# Patient Record
Sex: FEMALE | Race: WHITE | NOT HISPANIC OR LATINO | Employment: FULL TIME | URBAN - NONMETROPOLITAN AREA
[De-identification: names, ages, dates, MRNs, and addresses within clinical notes are randomized per-mention and may not be internally consistent; named-entity substitution may affect disease eponyms.]

---

## 2018-03-14 ENCOUNTER — OFFICE VISIT (OUTPATIENT)
Dept: FAMILY MEDICINE CLINIC | Facility: CLINIC | Age: 38
End: 2018-03-14

## 2018-03-14 VITALS
TEMPERATURE: 97.8 F | DIASTOLIC BLOOD PRESSURE: 78 MMHG | OXYGEN SATURATION: 99 % | BODY MASS INDEX: 42.22 KG/M2 | RESPIRATION RATE: 20 BRPM | SYSTOLIC BLOOD PRESSURE: 118 MMHG | WEIGHT: 269 LBS | HEART RATE: 64 BPM | HEIGHT: 67 IN

## 2018-03-14 DIAGNOSIS — Z00.00 ENCOUNTER FOR GENERAL ADULT MEDICAL EXAMINATION W/O ABNORMAL FINDINGS: ICD-10-CM

## 2018-03-14 DIAGNOSIS — F41.9 ANXIETY: Primary | ICD-10-CM

## 2018-03-14 DIAGNOSIS — F33.9 EPISODE OF RECURRENT MAJOR DEPRESSIVE DISORDER, UNSPECIFIED DEPRESSION EPISODE SEVERITY (HCC): ICD-10-CM

## 2018-03-14 DIAGNOSIS — M79.672 PAIN OF LEFT HEEL: ICD-10-CM

## 2018-03-14 DIAGNOSIS — G47.09 OTHER INSOMNIA: ICD-10-CM

## 2018-03-14 DIAGNOSIS — M77.32 HEEL SPUR, LEFT: ICD-10-CM

## 2018-03-14 DIAGNOSIS — M62.830 BACK MUSCLE SPASM: ICD-10-CM

## 2018-03-14 DIAGNOSIS — Z01.419 ENCOUNTER FOR GYNECOLOGICAL EXAMINATION WITHOUT ABNORMAL FINDING: ICD-10-CM

## 2018-03-14 DIAGNOSIS — B35.1 ONYCHOMYCOSIS OF TOENAIL: ICD-10-CM

## 2018-03-14 DIAGNOSIS — N92.6 IRREGULAR MENSES: ICD-10-CM

## 2018-03-14 DIAGNOSIS — F39 MOOD DISORDER (HCC): ICD-10-CM

## 2018-03-14 PROBLEM — K21.9 GERD (GASTROESOPHAGEAL REFLUX DISEASE): Status: ACTIVE | Noted: 2018-03-14

## 2018-03-14 PROBLEM — F32.9 MAJOR DEPRESSIVE DISORDER WITH CURRENT ACTIVE EPISODE: Status: ACTIVE | Noted: 2018-03-14

## 2018-03-14 PROCEDURE — 99214 OFFICE O/P EST MOD 30 MIN: CPT | Performed by: NURSE PRACTITIONER

## 2018-03-14 RX ORDER — CETIRIZINE HYDROCHLORIDE 10 MG/1
10 TABLET ORAL NIGHTLY
COMMUNITY

## 2018-03-14 RX ORDER — NORGESTIMATE AND ETHINYL ESTRADIOL 7DAYSX3 28
1 KIT ORAL
COMMUNITY
Start: 2016-09-27 | End: 2018-03-26

## 2018-03-14 RX ORDER — METHOCARBAMOL 750 MG/1
750 TABLET, FILM COATED ORAL 4 TIMES DAILY PRN
Qty: 30 TABLET | Refills: 1 | Status: SHIPPED | OUTPATIENT
Start: 2018-03-14 | End: 2019-03-21

## 2018-03-14 RX ORDER — TRAZODONE HYDROCHLORIDE 50 MG/1
50 TABLET ORAL 2 TIMES DAILY
Qty: 60 TABLET | Refills: 4 | Status: SHIPPED | OUTPATIENT
Start: 2018-03-14 | End: 2018-07-24

## 2018-03-14 RX ORDER — IBUPROFEN 200 MG
200 TABLET ORAL
COMMUNITY
End: 2018-03-26 | Stop reason: ALTCHOICE

## 2018-03-14 RX ORDER — TERBINAFINE HYDROCHLORIDE 250 MG/1
250 TABLET ORAL DAILY
Qty: 90 TABLET | Refills: 0 | Status: SHIPPED | OUTPATIENT
Start: 2018-03-14 | End: 2018-07-24

## 2018-03-14 RX ORDER — TRAZODONE HYDROCHLORIDE 50 MG/1
TABLET ORAL
COMMUNITY
Start: 2017-11-23 | End: 2018-03-14 | Stop reason: SDUPTHER

## 2018-03-14 NOTE — PROGRESS NOTES
"Chief Complaint   Patient presents with   • Establish Care       Subjective   HPI   Maria E Red is a 37 y.o. female presents to the office to establish care and for evaluation of back pain, anxiety/depression and heel pain.     PMH  Anxiety/deprssion- worsening; previously well controlled with trazodone   Requesting refill today    Back pain- waxing and waning; previous history of back spasm, no known injury     HPI   Patient presents to establish care. Previous PCP was Anyi Winter- private insurance will no longer cover this provider.   Patient is requesting to be restarted on trazodone previously prescribed for anxiety, depression,and sleep management. She ran out of her Rx 2 weeks prior. Patient states trazodone was effective in treating depression, anxiety, and insomnia and denies side effects. She is requesting a refill today.    She also c/o right sided back pain related to muscle spasm. 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 She has taken IBU with mild relief of pain. She denies injury, loss of bowel and bladder function, tingling numbness, and paresthesia.     She also c/o bilateral great toe fungus that she has tried to treat with otc medication with poor results. She states fungus gets some better, but then it comes back. She denies s/sx of any surrounding soft tissue infection.     She also c/o left heel pain. She thinks she may have a heel spur and would like to complete and XR today to confirm.     Family History   Problem Relation Age of Onset   • Hypertension Father    • Alcohol abuse Father    • Arthritis Father    • Depression Father    • Hyperlipidemia Father    • Arthritis Mother    • Depression Mother    • Early death Mother    • Depression Sister    • Drug abuse Brother    • Arthritis Maternal Grandmother    • COPD Maternal Grandmother    • Cancer Maternal Grandmother    • Depression Maternal " Grandmother    • Diabetes Maternal Grandmother    • Heart disease Maternal Grandmother    • Hypertension Maternal Grandmother    • Thyroid disease Maternal Grandmother    • Lymphoma Maternal Grandmother    • Arthritis Maternal Grandfather    • Hypertension Maternal Grandfather    • Arthritis Paternal Grandmother    • Depression Paternal Grandmother    • Heart disease Paternal Grandmother    • Hypertension Paternal Grandmother    • Cancer Paternal Grandmother    • Breast cancer Paternal Grandmother    • Arthritis Paternal Grandfather    • Heart disease Paternal Grandfather    • Hypertension Paternal Grandfather    • Kidney disease Paternal Uncle    • Learning disabilities Neg Hx      Social History     Social History   • Marital status:      Spouse name: N/A   • Number of children: N/A   • Years of education: N/A     Occupational History   • Not on file.     Social History Main Topics   • Smoking status: Former Smoker     Packs/day: 0.50     Years: 16.00     Quit date: 10/1/2016   • Smokeless tobacco: Never Used      Comment: quit and then started bk at a pack a week. Quit again after that   • Alcohol use Yes      Comment: occational    • Drug use: No   • Sexual activity: Yes     Partners: Male     Birth control/ protection: None     Other Topics Concern   • Not on file     Social History Narrative   • No narrative on file       Review of Systems   Constitutional: Negative.  Negative for activity change, chills, fatigue, fever and unexpected weight change.   HENT: Negative.  Negative for congestion, ear pain, postnasal drip, rhinorrhea, sinus pressure and sore throat.    Eyes: Negative.  Negative for pain and redness.   Respiratory: Negative.  Negative for cough, choking, chest tightness, shortness of breath and wheezing.    Cardiovascular: Negative.  Negative for chest pain, palpitations and leg swelling.   Gastrointestinal: Negative.  Negative for abdominal distention, abdominal pain, constipation, diarrhea,  "nausea and rectal pain.   Endocrine: Negative.    Genitourinary: Negative.  Negative for decreased urine volume, difficulty urinating, dysuria, flank pain, hematuria and urgency.   Musculoskeletal: Positive for back pain. Negative for gait problem and neck pain.   Skin: Negative.  Negative for rash and wound.   Allergic/Immunologic: Negative.    Neurological: Negative.  Negative for dizziness, syncope, weakness, light-headedness, numbness and headaches.   Hematological: Negative.    Psychiatric/Behavioral: Positive for agitation, dysphoric mood and sleep disturbance. Negative for behavioral problems, confusion, self-injury and suicidal ideas. The patient is not nervous/anxious.      14 Point ROS completed with pertinent positives discussed. All other systems reviewed and are negative.     The following portions of the patient's history were reviewed and updated as appropriate: allergies, current medications, past family history, past medical history, past social history, past surgical history and problem list.    Encounter Vitals  Vitals:    03/14/18 0840   BP: 118/78   Pulse: 64   Resp: 20   Temp: 97.8 °F (36.6 °C)   TempSrc: Tympanic   SpO2: 99%   Weight: 122 kg (269 lb)   Height: 170.2 cm (67\")   PainSc: 2  Comment: muscle spasms right sided   PainLoc: Back       Objective:  Physical Exam   Constitutional: She is oriented to person, place, and time. Vital signs are normal. She appears well-developed and well-nourished.   HENT:   Head: Normocephalic and atraumatic.   Right Ear: Tympanic membrane, external ear and ear canal normal.   Left Ear: Tympanic membrane, external ear and ear canal normal.   Nose: Nose normal.   Mouth/Throat: Uvula is midline, oropharynx is clear and moist and mucous membranes are normal. No posterior oropharyngeal edema or posterior oropharyngeal erythema.   Eyes: Lids are normal. Pupils are equal, round, and reactive to light.   Neck: Trachea normal and normal range of motion. Neck supple. " No thyroid mass present.   Cardiovascular: Normal rate, regular rhythm, S1 normal, S2 normal, normal heart sounds and intact distal pulses.  Exam reveals no gallop and no friction rub.    No murmur heard.  Pulmonary/Chest: Effort normal and breath sounds normal. No respiratory distress. She has no wheezes. She has no rales.   Abdominal: Soft. Normal appearance and bowel sounds are normal. She exhibits no mass. There is no rebound and no guarding.   Musculoskeletal: Normal range of motion.        Lumbar back: She exhibits tenderness and spasm.     Skin Integrity  -  She has right foot onychomycosis.She has left foot onychomycosis..     Lymphadenopathy:     She has no cervical adenopathy.   Neurological: She is alert and oriented to person, place, and time. She has normal strength. No cranial nerve deficit or sensory deficit. Gait normal.   Skin: Skin is warm and dry. No rash noted.   Psychiatric: She has a normal mood and affect. Her speech is normal and behavior is normal. Judgment and thought content normal. Cognition and memory are normal.   Nursing note and vitals reviewed.      Pertinent Labs  No visits with results within 1 Month(s) from this visit.   Latest known visit with results is:   Hospital Outpatient Visit on 08/23/2016   Component Date Value Ref Range Status   • Spec Descr 1 08/25/2016 SPECIMEN(S): A ENDOCERVICAL   Final   ]  Labs have been independently reviewed    Key Imaging/Tracings/POC Testing  XR left footL  calcaneus spurring  No fracture noted    Assessment and Medications  Maria E was seen today for establish care.    Diagnoses and all orders for this visit:    Anxiety    Pain of left heel  -     Cancel: XR Foot 3+ View Left  -     XR Foot 3+ View Left    Back muscle spasm  -     methocarbamol (ROBAXIN) 750 MG tablet; Take 1 tablet by mouth 4 (Four) Times a Day As Needed for Muscle Spasms.    Mood disorder  -     traZODone (DESYREL) 50 MG tablet; Take 1 tablet by mouth 2 (Two) Times a  Day.    Other insomnia  -     traZODone (DESYREL) 50 MG tablet; Take 1 tablet by mouth 2 (Two) Times a Day.    Episode of recurrent major depressive disorder, unspecified depression episode severity  -     traZODone (DESYREL) 50 MG tablet; Take 1 tablet by mouth 2 (Two) Times a Day.    Heel spur, left  -     Ambulatory Referral to Podiatry    Encounter for general adult medical examination w/o abnormal findings  -     CBC & Differential  -     Comprehensive Metabolic Panel  -     Hemoglobin A1c  -     Lipid Panel  -     TSH  -     Vitamin D 25 Hydroxy  -     Vitamin B12  -     CBC Auto Differential    Encounter for gynecological examination without abnormal finding    Onychomycosis of toenail  -     terbinafine (LAMISIL) 250 MG tablet; Take 1 tablet by mouth Daily.    Irregular menses  -     Ambulatory Referral to Gynecology      Side effects of ordered medications discussed with patient.     Plan/Additional Notes/Instructions  Plan   1. Refer to podiatry per patient request for left heel pain secondary to spurring  2. Fasting labs tbc this week- will schedule appt for review after completion  3. lamisil for bilateral toenail fungus- must use for 3 months. Will take at least 6-12 months for toenail to grow out and be healed  4. Robaxin to use prn for lumbar back spasm  5. Gyn referral per patient request- need to establish care and c/o irregular menses  6. F/u after labwork is complete and after referrals-   7. Will evaluate efficacy of trazodone at f/u appt    Follow-Up  Return in about 4 weeks (around 4/11/2018), or if symptoms worsen or fail to improve, for After above ordered referral is complete, After ordered studies are completed.    Patient/caregiver verbalizes understanding of all orders and instructions in this plan of care.       PHQ-2/PHQ-9 Depression Screening 3/14/2018   Little interest or pleasure in doing things 1   Feeling down, depressed, or hopeless 1   Trouble falling or staying asleep, or  sleeping too much 2   Feeling tired or having little energy 3   Poor appetite or overeating 3   Feeling bad about yourself - or that you are a failure or have let yourself or your family down 0   Trouble concentrating on things, such as reading the newspaper or watching television 3   Moving or speaking so slowly that other people could have noticed. Or the opposite - being so fidgety or restless that you have been moving around a lot more than usual 0   Thoughts that you would be better off dead, or of hurting yourself in some way 0   Total Score 13   If you checked off any problems, how difficult have these problems made it for you to do your work, take care of things at home, or get along with other people? Somewhat difficult     Total Score: 13           This document has been electronically signed by KIERRA Pagan on March 15, 2018 12:06 PM

## 2018-03-15 ENCOUNTER — TELEPHONE (OUTPATIENT)
Dept: FAMILY MEDICINE CLINIC | Facility: CLINIC | Age: 38
End: 2018-03-15

## 2018-03-15 PROBLEM — B35.1 ONYCHOMYCOSIS OF TOENAIL: Status: ACTIVE | Noted: 2018-03-15

## 2018-03-15 LAB
25(OH)D3 SERPL-MCNC: 19.8 NG/ML (ref 30–100)
ALBUMIN SERPL-MCNC: 4 G/DL (ref 3.2–5.5)
ALBUMIN/GLOB SERPL: 1.4 G/DL (ref 1–3)
ALP SERPL-CCNC: 62 U/L (ref 15–121)
ALT SERPL W P-5'-P-CCNC: 44 U/L (ref 10–60)
ANION GAP SERPL CALCULATED.3IONS-SCNC: 8 MMOL/L (ref 5–15)
AST SERPL-CCNC: 29 U/L (ref 10–60)
BASOPHILS # BLD AUTO: 0.03 10*3/MM3 (ref 0–0.2)
BASOPHILS NFR BLD AUTO: 0.4 % (ref 0–2)
BILIRUB SERPL-MCNC: 1.4 MG/DL (ref 0.2–1)
BUN BLD-MCNC: 11 MG/DL (ref 8–25)
BUN/CREAT SERPL: 18.3 (ref 7–25)
CALCIUM SPEC-SCNC: 8.9 MG/DL (ref 8.4–10.8)
CHLORIDE SERPL-SCNC: 104 MMOL/L (ref 100–112)
CHOLEST SERPL-MCNC: 137 MG/DL (ref 150–200)
CO2 SERPL-SCNC: 24 MMOL/L (ref 20–32)
CREAT BLD-MCNC: 0.6 MG/DL (ref 0.4–1.3)
DEPRECATED RDW RBC AUTO: 44.8 FL (ref 36.4–46.3)
EOSINOPHIL # BLD AUTO: 0.06 10*3/MM3 (ref 0–0.7)
EOSINOPHIL NFR BLD AUTO: 0.8 % (ref 0–7)
ERYTHROCYTE [DISTWIDTH] IN BLOOD BY AUTOMATED COUNT: 13.8 % (ref 11.5–14.5)
GFR SERPL CREATININE-BSD FRML MDRD: 113 ML/MIN/1.73 (ref 64–149)
GLOBULIN UR ELPH-MCNC: 2.9 GM/DL (ref 2.5–4.6)
GLUCOSE BLD-MCNC: 88 MG/DL (ref 70–100)
HBA1C MFR BLD: 5.5 % (ref 4–5.6)
HCT VFR BLD AUTO: 38.4 % (ref 35–45)
HDLC SERPL-MCNC: 42 MG/DL (ref 35–100)
HGB BLD-MCNC: 12.9 G/DL (ref 12–15.5)
LDLC SERPL CALC-MCNC: 85 MG/DL
LDLC/HDLC SERPL: 2.01 {RATIO}
LYMPHOCYTES # BLD AUTO: 3.59 10*3/MM3 (ref 0.6–4.2)
LYMPHOCYTES NFR BLD AUTO: 46.5 % (ref 10–50)
MCH RBC QN AUTO: 30.6 PG (ref 26.5–34)
MCHC RBC AUTO-ENTMCNC: 33.6 G/DL (ref 31.4–36)
MCV RBC AUTO: 91 FL (ref 80–98)
MONOCYTES # BLD AUTO: 0.46 10*3/MM3 (ref 0–0.9)
MONOCYTES NFR BLD AUTO: 6 % (ref 0–12)
NEUTROPHILS # BLD AUTO: 3.58 10*3/MM3 (ref 2–8.6)
NEUTROPHILS NFR BLD AUTO: 46.3 % (ref 37–80)
PLATELET # BLD AUTO: 260 10*3/MM3 (ref 150–450)
PMV BLD AUTO: 9.9 FL (ref 8–12)
POTASSIUM BLD-SCNC: 4.1 MMOL/L (ref 3.4–5.4)
PROT SERPL-MCNC: 6.9 G/DL (ref 6.7–8.2)
RBC # BLD AUTO: 4.22 10*6/MM3 (ref 3.77–5.16)
SODIUM BLD-SCNC: 136 MMOL/L (ref 134–146)
TRIGL SERPL-MCNC: 52 MG/DL (ref 35–160)
TSH SERPL DL<=0.05 MIU/L-ACNC: 1.95 MIU/ML (ref 0.46–4.68)
VIT B12 BLD-MCNC: 453 PG/ML (ref 239–931)
VLDLC SERPL-MCNC: 10.4 MG/DL
WBC NRBC COR # BLD: 7.72 10*3/MM3 (ref 3.2–9.8)

## 2018-03-15 PROCEDURE — 85025 COMPLETE CBC W/AUTO DIFF WBC: CPT | Performed by: NURSE PRACTITIONER

## 2018-03-15 PROCEDURE — 84443 ASSAY THYROID STIM HORMONE: CPT | Performed by: NURSE PRACTITIONER

## 2018-03-15 PROCEDURE — 83036 HEMOGLOBIN GLYCOSYLATED A1C: CPT | Performed by: NURSE PRACTITIONER

## 2018-03-15 PROCEDURE — 80061 LIPID PANEL: CPT | Performed by: NURSE PRACTITIONER

## 2018-03-15 PROCEDURE — 36415 COLL VENOUS BLD VENIPUNCTURE: CPT | Performed by: NURSE PRACTITIONER

## 2018-03-15 PROCEDURE — 82607 VITAMIN B-12: CPT | Performed by: NURSE PRACTITIONER

## 2018-03-15 PROCEDURE — 80053 COMPREHEN METABOLIC PANEL: CPT | Performed by: NURSE PRACTITIONER

## 2018-03-15 PROCEDURE — 82306 VITAMIN D 25 HYDROXY: CPT | Performed by: NURSE PRACTITIONER

## 2018-03-15 NOTE — TELEPHONE ENCOUNTER
----- Message from KIERRA Pagan sent at 3/15/2018 12:05 PM CDT -----  Please call patient to inform her she will take lamisil for 3 months. It will take 6-12 months for her to grow her toenail out and be completely healed. Also- ask her why we are sending her to [facility]. Was it just for routine care- to establish  ? Or did she have a complaint- seems like menses irregularity?

## 2018-03-15 NOTE — PROGRESS NOTES
Please call patient to inform her she will take lamisil for 3 months. It will take 6-12 months for her to grow her toenail out and be completely healed. Also- ask her why we are sending her to [facility]. Was it just for routine care- to establish? Or did she have a complaint- seems like menses irregularity?

## 2018-03-15 NOTE — TELEPHONE ENCOUNTER
Called to give pt instructions about lamisil. Pt also stated she had appt with Marni Agustin just for regular check up

## 2018-03-26 ENCOUNTER — OFFICE VISIT (OUTPATIENT)
Dept: PODIATRY | Facility: CLINIC | Age: 38
End: 2018-03-26

## 2018-03-26 ENCOUNTER — PROCEDURE VISIT (OUTPATIENT)
Dept: OBSTETRICS AND GYNECOLOGY | Facility: CLINIC | Age: 38
End: 2018-03-26

## 2018-03-26 VITALS
BODY MASS INDEX: 42.44 KG/M2 | DIASTOLIC BLOOD PRESSURE: 80 MMHG | WEIGHT: 270.4 LBS | HEIGHT: 67 IN | RESPIRATION RATE: 18 BRPM | SYSTOLIC BLOOD PRESSURE: 116 MMHG | HEART RATE: 82 BPM

## 2018-03-26 VITALS
BODY MASS INDEX: 43 KG/M2 | HEIGHT: 67 IN | WEIGHT: 274 LBS | HEART RATE: 71 BPM | DIASTOLIC BLOOD PRESSURE: 66 MMHG | OXYGEN SATURATION: 99 % | SYSTOLIC BLOOD PRESSURE: 119 MMHG

## 2018-03-26 DIAGNOSIS — N91.2 AMENORRHEA: ICD-10-CM

## 2018-03-26 DIAGNOSIS — Z01.419 ENCOUNTER FOR GYNECOLOGICAL EXAMINATION WITH PAPANICOLAOU SMEAR OF CERVIX: Primary | ICD-10-CM

## 2018-03-26 DIAGNOSIS — M79.672 LEFT FOOT PAIN: Primary | ICD-10-CM

## 2018-03-26 DIAGNOSIS — E28.2 PCOS (POLYCYSTIC OVARIAN SYNDROME): ICD-10-CM

## 2018-03-26 DIAGNOSIS — M72.2 PLANTAR FASCIITIS: ICD-10-CM

## 2018-03-26 LAB
B-HCG UR QL: NEGATIVE
INTERNAL NEGATIVE CONTROL: NEGATIVE
INTERNAL POSITIVE CONTROL: POSITIVE
Lab: NORMAL

## 2018-03-26 PROCEDURE — 81025 URINE PREGNANCY TEST: CPT | Performed by: NURSE PRACTITIONER

## 2018-03-26 PROCEDURE — 99213 OFFICE O/P EST LOW 20 MIN: CPT | Performed by: NURSE PRACTITIONER

## 2018-03-26 PROCEDURE — 99395 PREV VISIT EST AGE 18-39: CPT | Performed by: NURSE PRACTITIONER

## 2018-03-26 PROCEDURE — 88141 CYTOPATH C/V INTERPRET: CPT | Performed by: PATHOLOGY

## 2018-03-26 PROCEDURE — 88142 CYTOPATH C/V THIN LAYER: CPT | Performed by: PATHOLOGY

## 2018-03-26 PROCEDURE — 99203 OFFICE O/P NEW LOW 30 MIN: CPT | Performed by: PODIATRIST

## 2018-03-26 RX ORDER — MELOXICAM 15 MG/1
15 TABLET ORAL DAILY
Qty: 30 TABLET | Refills: 0 | Status: SHIPPED | OUTPATIENT
Start: 2018-03-26 | End: 2018-06-07

## 2018-03-26 NOTE — PROGRESS NOTES
Maria E Red  1980  37 y.o. female  PCP- KIERRA Pagan  Patient presents today for left foot heel spurs. X-rays were taken on 2018.  2018    Chief Complaint   Patient presents with   • Left Foot - Heel Spurs           History of Present Illness    Maria E Red is a 37 y.o.female who presents to clinic today with chief complaint of left foot pain.  Pain is located to the bottom of her left heel.  It has been present for 2 months.  She rates it as a 0 out of 10 currently in a 5 out of 10 at its worst.  She describes pain as sharp and worse with the first step in the morning.  She denies any injuries.  X-rays have already been taken.  She has no additional pedal complaints.      Past Medical History:   Diagnosis Date   • Anxiety    • Atypical squamous cells of undetermined significance (ASC-US) on cervical Pap smear    • Backache    • Constipation    • Depression    • Encounter for  visit    • Encounter for routine gynecological examination    • Epigastric pain    • Exanthematous disorder    • GERD (gastroesophageal reflux disease)    • Hypertensive disorder     resolved   • Low back pain    • Obesity    • PCOS (polycystic ovarian syndrome)    • Polycystic ovaries    • Psychogenic pruritus    • Surgery follow-up          Past Surgical History:   Procedure Laterality Date   •  SECTION  2012    primary   • CHOLECYSTECTOMY     • FRACTURE SURGERY      boxers fracture-2016         Family History   Problem Relation Age of Onset   • Hypertension Father    • Alcohol abuse Father    • Arthritis Father    • Depression Father    • Hyperlipidemia Father    • Arthritis Mother    • Depression Mother    • Early death Mother    • Depression Sister    • Drug abuse Brother    • Arthritis Maternal Grandmother    • COPD Maternal Grandmother    • Cancer Maternal Grandmother    • Depression Maternal Grandmother    • Diabetes Maternal Grandmother    • Heart disease Maternal  Grandmother    • Hypertension Maternal Grandmother    • Thyroid disease Maternal Grandmother    • Lymphoma Maternal Grandmother    • Arthritis Maternal Grandfather    • Hypertension Maternal Grandfather    • Arthritis Paternal Grandmother    • Depression Paternal Grandmother    • Heart disease Paternal Grandmother    • Hypertension Paternal Grandmother    • Cancer Paternal Grandmother    • Breast cancer Paternal Grandmother    • Arthritis Paternal Grandfather    • Heart disease Paternal Grandfather    • Hypertension Paternal Grandfather    • Kidney disease Paternal Uncle    • Learning disabilities Neg Hx        Allergies   Allergen Reactions   • Codeine Confusion and Hallucinations   • Dilaudid [Hydromorphone Hcl]        Social History     Social History   • Marital status:      Spouse name: N/A   • Number of children: N/A   • Years of education: N/A     Occupational History   • Not on file.     Social History Main Topics   • Smoking status: Former Smoker     Packs/day: 0.50     Years: 16.00     Quit date: 10/1/2016   • Smokeless tobacco: Never Used      Comment: quit and then started bk at a pack a week. Quit again after that   • Alcohol use Yes      Comment: occational    • Drug use: No   • Sexual activity: Yes     Partners: Male     Birth control/ protection: None     Other Topics Concern   • Not on file     Social History Narrative   • No narrative on file         Current Outpatient Prescriptions   Medication Sig Dispense Refill   • cetirizine (zyrTEC) 10 MG tablet Take 10 mg by mouth.     • methocarbamol (ROBAXIN) 750 MG tablet Take 1 tablet by mouth 4 (Four) Times a Day As Needed for Muscle Spasms. 30 tablet 1   • norgestimate-ethinyl estradiol (ORTHO TRI-CYCLEN,TRINESSA) 0.18/0.215/0.25 MG-35 MCG per tablet Take 1 tablet by mouth.     • omeprazole (priLOSEC) 20 MG capsule Take 20 mg by mouth Daily.     • terbinafine (LAMISIL) 250 MG tablet Take 1 tablet by mouth Daily. 90 tablet 0   • traZODone  "(DESYREL) 50 MG tablet Take 1 tablet by mouth 2 (Two) Times a Day. 60 tablet 4   • meloxicam (MOBIC) 15 MG tablet Take 1 tablet by mouth Daily. 30 tablet 0     No current facility-administered medications for this visit.          OBJECTIVE    /66   Pulse 71   Ht 170.2 cm (67.01\")   Wt 124 kg (274 lb)   SpO2 99%   BMI 42.90 kg/m²       Review of Systems   Constitutional: Negative.    HENT: Negative.    Eyes: Negative.    Respiratory: Negative.    Cardiovascular: Positive for leg swelling.   Gastrointestinal: Positive for constipation.   Endocrine: Negative.    Genitourinary: Negative.    Musculoskeletal: Positive for back pain.        Foot pain   Skin: Negative.    Allergic/Immunologic: Negative.    Neurological: Negative.    Hematological: Negative.    Psychiatric/Behavioral: Negative.            Physical Exam   Constitutional: She is oriented to person, place, and time. She appears well-developed and well-nourished. No distress.   HENT:   Head: Normocephalic and atraumatic.   Nose: Nose normal.   Eyes: Conjunctivae and EOM are normal. Pupils are equal, round, and reactive to light.   Pulmonary/Chest: Effort normal. No respiratory distress. She has no wheezes.   Neurological: She is alert and oriented to person, place, and time. She has normal reflexes.   Skin: She is not diaphoretic.   Psychiatric: She has a normal mood and affect. Her behavior is normal.   Vitals reviewed.      Lower Extremity    Cardiovascular:    DP/PT pulses palpable    CFT brisk  to all digits  Skin temp is warm to warm from proximal tibia to distal digits  Pedal hair growth present.   No erythema or edema noted     Musculoskeletal:  Muscle strength is 5/5 for all muscle groups tested   ROM of the 1st MTP is full without pain or crepitus  ROM of the MTJ is full without pain or crepitus    ROM of the STJ is full without pain or crepitus    ROM of the ankle joint is full without pain or crepitus    Pain on palpation to the medial " tubercle of the left calcaneus.  Negative lateral squeeze test.    Dermatological:   Webspaces 1-4 bilateral are clean, dry and intact.   No subcutaneous nodules or masses noted    No open wounds noted     Neurological:   Protective sensation intact    Sensation intact to light touch            Procedures        ASSESSMENT AND PLAN    Maria E was seen today for heel spurs.    Diagnoses and all orders for this visit:    Left foot pain    Plantar fasciitis    Other orders  -     meloxicam (MOBIC) 15 MG tablet; Take 1 tablet by mouth Daily.      - Comprehensive foot and ankle exam performed  - X-rays reviewed, small plantar calcaneal spur   - rx for mobic  - Patient advised to perform stretching exercises, icing and to make appropriate shoe gear changes to include wearing athletic type shoes with supportive insoles.  No bare foot walking.  Patient also given written instructions on how to correctly perform the stretching of the Achilles tendon/calf stretches, and the heel spur/plantar fasciitis regimen.  - Recommended over-the-counter insole such as power steps to properly support the arch in order to alleviate the tension in stress on the plantar fascia associated with normal daily walking. Patient was educated on the break-in period for new orthotics.  - Patient is in agreement with the current treatment plan.  All  questions were answered to their satisfaction.  - RTC in 4 weeks            This document has been electronically signed by Awais Cowan DPM on March 26, 2018 1:01 PM     3/26/2018  1:01 PM

## 2018-03-27 NOTE — PROGRESS NOTES
Subjective   Chief Complaint   Patient presents with   • Gynecologic Exam     well woman annual visit     Maria E Red is a 37 y.o. year old  presenting to be seen for her annual exam.  Today she has no concerns but expresses LMP to be 14 months ago. Pt states she was diagnosed with PCOS as a teenager. Has had amenorrhea most of her life unless on OCPs. Pt stopped OCPs 14 months ago and no period since. She notes she has had 2-3 EMBs and 2 LEEP procedures due to abnormal pap smears. Pt is not using any form of contraceptive but does not desire any more children.     No LMP recorded (lmp unknown). Patient is not currently having periods (Reason: Other).    OB History      Para Term  AB Living    2 1 1  1 1    SAB TAB Ectopic Molar Multiple Live Births    1               Current birth control method: none.    She is sexually active.  In the past 12 months there has not been new sexual partners.  Condoms are not typically used.  She would not like to be screened for STD's at today's exam.     Past 6 month menstrual history:    Cycle Frequency: absent   Menstrual cycle character: flow has been absent   Cycle Duration: 0 - 0   Number of heavy days of flows: 0   Dysmenorrhea: none   PMS: none   Intermenstrual bleeding present: no   Post-coital bleeding present: no     She exercises regularly: no.  She wears her seat belt:yes.  She has concerns about domestic violence: no.  Last colonoscopy: , IBS  Last DEXA: Never  Last MMG: Never  Last pap: 16  History of abnormal PAP: Yes, numerous, unsure of results, requiring LEEPs x 2     The following portions of the patient's history were reviewed and updated as appropriate:problem list, current medications, allergies, past family history, past medical history, past social history and past surgical history.    Smoking status: Former Smoker                                                              Packs/day: 0.00      Years: 0.00          "Quit date: 10/1/2016  Smokeless tobacco: Never Used                        History   Alcohol Use   • Yes     Comment: occational       Review of Systems   Constitutional: Negative.  Negative for chills and fever.   Respiratory: Negative.    Cardiovascular: Negative.    Gastrointestinal: Negative.    Endocrine: Negative.  Negative for cold intolerance and heat intolerance.   Genitourinary: Positive for menstrual problem (amenorrhea) and vaginal discharge (clear mucus without odor, itching or burning).   Skin: Negative.    Neurological: Negative for dizziness, syncope, light-headedness and headaches.   Psychiatric/Behavioral: Negative for suicidal ideas. The patient is not nervous/anxious.          Objective   /80 (BP Location: Right arm, Patient Position: Sitting, Cuff Size: Large Adult)   Pulse 82   Resp 18   Ht 170.2 cm (67\")   Wt 123 kg (270 lb 6.4 oz)   LMP  (LMP Unknown) Comment: pcos, last period 14 months  Breastfeeding? No   BMI 42.35 kg/m²     General:  well developed; well nourished  no acute distress   Skin:  No suspicious lesions seen   Thyroid: not examined   Breasts:  Examined in supine position  Symmetric without masses or skin dimpling  Nipples normal without inversion, lesions or discharge  There are no palpable axillary nodes  Fibrocystic changes are present both breasts without a discrete mass   Abdomen: soft, non-tender; no masses  no umbilical or inginual hernias are present  no hepato-splenomegaly  Normal findings: bowel sounds normal   Cardiac: Heart sounds are normal.  Regular rate and rhythm without murmur, gallop or rub.   Resp: Normal expansion.  Clear to auscultation.  No rales, rhonchi, or wheezing.   Psych: alert,oriented, in NAD with a full range of affect, normal behavior and no psychotic features   Pelvis: Uterus:  Clinical staff was present for exam  External genitalia:  normal appearance of the external genitalia including Bartholin's and [place]'s glands.  :  urethral " meatus normal;  Vaginal:  normal pink mucosa without prolapse or lesions and discharge present -  white and thin, no odor noted  Cervix:  normal appearance.  Uterus:  normal size, shape and consistency  Adnexa:  normal bimanual exam of the adnexa.     Lab Review   No data reviewed    Imaging  No data reviewed       Diagnoses and all orders for this visit:    Encounter for gynecological examination with Papanicolaou smear of cervix  -     Liquid-based Pap Smear, Screening  Pap results: I will send card in mail or call if abnormal. RTC annually for well woman exams    PCOS (polycystic ovarian syndrome)  -     US Non-ob Transvaginal    Amenorrhea  -     US Non-ob Transvaginal  -     POC Pregnancy, Urine, NEGATIVE    Obtained TVUS for evaluation of endometrial lining. EMB to be scheduled after US is resulted. I spent 15 minutes educating pt on PCOS and concerns with amenorrhea including endometrial cancer. She voiced new understanding to this that she didn't previous know. She agrees to US and EMB. Briefly discussed options to help control cycles or provide endometrial protection. Pt refuses IUD. Will consider cyclic provera PRN. We will continue to discuss at follow up visit.     This note was electronically signed.    Marni Lang, APRN  March 27, 2018

## 2018-03-28 LAB
LAB AP CASE REPORT: ABNORMAL
LAB AP GYN ADDITIONAL INFORMATION: ABNORMAL
Lab: ABNORMAL
PATH INTERP SPEC-IMP: ABNORMAL
STAT OF ADQ CVX/VAG CYTO-IMP: ABNORMAL

## 2018-04-06 DIAGNOSIS — R87.612 LGSIL ON PAP SMEAR OF CERVIX: Primary | ICD-10-CM

## 2018-04-06 RX ORDER — MEDROXYPROGESTERONE ACETATE 10 MG/1
10 TABLET ORAL DAILY
Qty: 10 TABLET | Refills: 0 | Status: SHIPPED | OUTPATIENT
Start: 2018-04-06 | End: 2018-04-16

## 2018-04-06 NOTE — PROGRESS NOTES
Lining 5mm. Will do provera challenge due to amenorrhea.     Pap smear LGSIL. Referral initiated to Dr. Salinas or  Friday only per pt request. Whichever can see pt sooner

## 2018-05-09 ENCOUNTER — PROCEDURE VISIT (OUTPATIENT)
Dept: OBSTETRICS AND GYNECOLOGY | Facility: CLINIC | Age: 38
End: 2018-05-09

## 2018-05-09 VITALS
DIASTOLIC BLOOD PRESSURE: 64 MMHG | WEIGHT: 267 LBS | BODY MASS INDEX: 41.91 KG/M2 | HEIGHT: 67 IN | SYSTOLIC BLOOD PRESSURE: 112 MMHG

## 2018-05-09 DIAGNOSIS — R87.612 LOW GRADE SQUAMOUS INTRAEPITH LESION ON CYTOLOGIC SMEAR CERVIX (LGSIL): Primary | ICD-10-CM

## 2018-05-09 PROCEDURE — 81025 URINE PREGNANCY TEST: CPT | Performed by: OBSTETRICS & GYNECOLOGY

## 2018-05-09 PROCEDURE — 57454 BX/CURETT OF CERVIX W/SCOPE: CPT | Performed by: OBSTETRICS & GYNECOLOGY

## 2018-05-09 PROCEDURE — 88305 TISSUE EXAM BY PATHOLOGIST: CPT | Performed by: OBSTETRICS & GYNECOLOGY

## 2018-05-09 PROCEDURE — 88305 TISSUE EXAM BY PATHOLOGIST: CPT | Performed by: PATHOLOGY

## 2018-05-09 NOTE — PROGRESS NOTES
"SUBJECTIVE:  Maria E Red here for colposcopy.  Patient had a pap smear in 3/2018 that showed LSIL.  She had a similar pap in 2016 that showed LSIL with a negative colpo.  She was asked to repeat her pap in 1 year.  No pelvic pain, no post coital bleeding.  No discharge.   States she is just tired of having abnormal paps.  Probably has had 4 colpos since her late 20's.      No LMP recorded. Patient is not currently having periods (Reason: Other).     No changes to PMH, PSH, family or social history since last visit.  No new medications or allergies.     OB History      Para Term  AB Living    2 1 1  1 1    SAB TAB Ectopic Molar Multiple Live Births    1             Pregnancy test: negative    OBJECTIVE:  /64   Ht 170.2 cm (67\")   Wt 121 kg (267 lb)   LMP 2018   BMI 41.82 kg/m²   Consent: obtained  Prep: Acetic Acid 5% SOLN      COLPOSCPY  Adequate colpo  SQJ visualized  Acetowhite epithelium noted at 11 and 4 o'clock  Cervical bx at 11 and 4 o'clock  Endocervical curettage preformed   Hemostasis obtained with pressure and silver nitrate  vulva visually normal  vagina visually normal    Post-op status: Maria E Red  tolerate procedure well.    A:  1. Low grade squamous intraepith lesion on cytologic smear cervix (lgsil)      P:   Tissue to pathology   postcolpo instructions given to patient  see avs  Scheduled follow up appointment in 1-2 weeks for results    Adrienne YAO Friday         "

## 2018-05-09 NOTE — PATIENT INSTRUCTIONS
Colposcopy: Before Your Procedure    What is a colposcopy?    Colposcopy is an exam with a microscope which allows your doctor to look closely at your vulva, vagina, and cervix to look for any problems, specifically changes that may indicate a precancerous lesion. . If the doctor sees an area of concern, he or she can take a small sample of tissue. This is called a biopsy. The sample is looked at under a microscope by a pathologist for evaluation. Colposcopy is usually done when the result of a pap test is abnormal.    The doctor may put a vinegar or iodine solution on your cervix to make abnormal areas more visible.     You may feel some discomfort when the speculum is inserted. You may feel a pinch and have some cramping if the doctor takes a biopsy sample.    Tell your doctor if:  You are having your menstrual period. This test usually is not done during your period, because blood cells make it harder for your doctor to see your cervix.  You are or might be pregnant. A blood or urine test may be done to see if you are pregnant. Colposcopy is safe during pregnancy. The chance of miscarriage is very small. But you may have some bleeding from a biopsy.  You are allergic to any medicines.  You have had bleeding problems or take blood thinners, such as warfarin (Coumadin), clopidogrel (Plavix), or aspirin.  You have been treated for a vaginal, cervical, or pelvic infection.    Do not douche, use tampons, have sexual intercourse, or use vaginal medicines for 24 hours before the test.  Bring a list of questions to ask your doctors. It is important that you understand exactly what procedure is planned, the risks, benefits, and other options before your procedure.  Tell your doctors ALL the medicines, vitamins, supplements, or herbal remedies you are taking. Keep a list of these with you, and bring this with you to every appointment. You will be told which medicine to take or to stop before your procedure.  The  procedure will last about 15 to 30 minutes.      Colposcopy: What to Expect at Home Your Recovery  You may feel some soreness in your vagina for a day or two if you had a biopsy. Some vaginal bleeding or discharge is normal for up to a week after a biopsy. The discharge may be dark-colored if a solution was put on your cervix. You can use a sanitary pad for the bleeding.    It may take a week or two for you to get the test results.    This care sheet gives you a general idea about how long it will take for you to recover. But each person recovers at a different pace. Follow the steps below to feel better as quickly as possible.    How can you care for yourself at home?  Activity  You can return to work and most daily activities right after the test.  Exercise  Do not exercise for 1 day after the test.  Medicines  Take an over-the-counter pain medicine, such as acetaminophen (Tylenol), ibuprofen (Advil, Motrin), or naproxen (Aleve). Read and follow all instructions on the label. Do not take two or more pain medicines at the same time unless the doctor told you to. Many pain medicines have acetaminophen, which is Tylenol. Too much acetaminophen (Tylenol) can be harmful.  Other instructions  Use a pad if you have some bleeding.  Do not douche, have sexual intercourse, or use tampons for 2 weeks if you had a biopsy. This will allow time for your cervix to heal.  You can take a bath or shower anytime after the test.    Call your doctor now or seek immediate medical care if:  You have severe vaginal bleeding. You are passing clots of blood and soaking through your usual pads or tampons each hour for 2 or more hours.  You have pain that does not get better after you take pain medicine.  You have signs of infection, such as:  Increased pain.  Bad-smelling vaginal discharge.  A fever.  Watch closely for any changes in your health, and be sure to contact your doctor if:  You have questions or concerns.

## 2018-05-10 LAB
LAB AP CASE REPORT: NORMAL
LAB AP CLINICAL INFORMATION: NORMAL
Lab: NORMAL
PATH REPORT.FINAL DX SPEC: NORMAL
PATH REPORT.GROSS SPEC: NORMAL

## 2018-05-23 ENCOUNTER — OFFICE VISIT (OUTPATIENT)
Dept: OBSTETRICS AND GYNECOLOGY | Facility: CLINIC | Age: 38
End: 2018-05-23

## 2018-05-23 VITALS
WEIGHT: 265 LBS | SYSTOLIC BLOOD PRESSURE: 112 MMHG | BODY MASS INDEX: 41.59 KG/M2 | DIASTOLIC BLOOD PRESSURE: 69 MMHG | HEIGHT: 67 IN

## 2018-05-23 DIAGNOSIS — N87.1 DYSPLASIA OF CERVIX, HIGH GRADE CIN 2: Primary | ICD-10-CM

## 2018-05-23 PROCEDURE — 99213 OFFICE O/P EST LOW 20 MIN: CPT | Performed by: OBSTETRICS & GYNECOLOGY

## 2018-05-23 RX ORDER — SODIUM CHLORIDE 0.9 % (FLUSH) 0.9 %
1-10 SYRINGE (ML) INJECTION AS NEEDED
Status: CANCELLED | OUTPATIENT
Start: 2018-06-12

## 2018-05-23 NOTE — PROGRESS NOTES
Subjective     CC: follow up, colpo results    Maria E Red is a 37 y.o.   Presents for follow up after colpo.  No concerns or complaints.  No problems since colpo.  No discharge or bleeding.     Past Medical History:   Diagnosis Date   • Anxiety    • Atypical squamous cells of undetermined significance (ASC-US) on cervical Pap smear    • Backache    • Constipation    • Depression    • Encounter for  visit    • Encounter for routine gynecological examination    • Epigastric pain    • Exanthematous disorder    • GERD (gastroesophageal reflux disease)    • HPV (human papilloma virus) infection    • Hypertensive disorder     resolved   • Low back pain    • Obesity    • PCOS (polycystic ovarian syndrome)    • Polycystic ovaries    • Psychogenic pruritus    • Surgery follow-up      Past Surgical History:   Procedure Laterality Date   •  SECTION  2012    primary   • CHOLECYSTECTOMY     • FRACTURE SURGERY      boxers fracture-     Social History     Social History   • Marital status:      Spouse name: N/A   • Number of children: N/A   • Years of education: N/A     Occupational History   • Not on file.     Social History Main Topics   • Smoking status: Former Smoker     Quit date: 10/1/2016   • Smokeless tobacco: Never Used   • Alcohol use Yes      Comment: occational    • Drug use: No   • Sexual activity: Yes     Partners: Male     Other Topics Concern   • Not on file     Social History Narrative   • No narrative on file     Family History   Problem Relation Age of Onset   • Hypertension Father    • Alcohol abuse Father    • Arthritis Father    • Depression Father    • Hyperlipidemia Father    • Arthritis Mother    • Depression Mother    • Early death Mother    • Depression Sister    • Drug abuse Brother    • Arthritis Maternal Grandmother    • COPD Maternal Grandmother    • Cancer Maternal Grandmother    • Depression Maternal Grandmother    • Diabetes Maternal Grandmother  "   • Heart disease Maternal Grandmother    • Hypertension Maternal Grandmother    • Thyroid disease Maternal Grandmother    • Lymphoma Maternal Grandmother    • Arthritis Maternal Grandfather    • Hypertension Maternal Grandfather    • Arthritis Paternal Grandmother    • Depression Paternal Grandmother    • Heart disease Paternal Grandmother    • Hypertension Paternal Grandmother    • Cancer Paternal Grandmother    • Breast cancer Paternal Grandmother    • Arthritis Paternal Grandfather    • Heart disease Paternal Grandfather    • Hypertension Paternal Grandfather    • Kidney disease Paternal Uncle    • Learning disabilities Neg Hx      Current Outpatient Prescriptions on File Prior to Visit   Medication Sig   • brompheniramine-pseudoephedrine-DM 30-2-10 MG/5ML syrup Take 5 mL by mouth 3 (Three) Times a Day As Needed for Cough for up to 10 days.   • cetirizine (zyrTEC) 10 MG tablet Take 10 mg by mouth.   • meloxicam (MOBIC) 15 MG tablet Take 1 tablet by mouth Daily.   • methocarbamol (ROBAXIN) 750 MG tablet Take 1 tablet by mouth 4 (Four) Times a Day As Needed for Muscle Spasms.   • omeprazole (priLOSEC) 20 MG capsule Take 20 mg by mouth Daily.   • terbinafine (LAMISIL) 250 MG tablet Take 1 tablet by mouth Daily.   • traZODone (DESYREL) 50 MG tablet Take 1 tablet by mouth 2 (Two) Times a Day.   • [DISCONTINUED] amoxicillin (AMOXIL) 500 MG capsule Take 1 capsule by mouth 2 (Two) Times a Day.     No current facility-administered medications on file prior to visit.      Allergies   Allergen Reactions   • Codeine Confusion and Hallucinations   • Dilaudid [Hydromorphone Hcl]      Review of Systems - comprehensive ROS preformed and all negative.     Objective      /69   Ht 170.2 cm (67\")   Wt 120 kg (265 lb)   LMP 05/01/2018   BMI 41.50 kg/m²     Physical Exam  General:   Appears stated age, AAOx3, NAD   Heart: RRR no m/r/g   Lungs: CTAB   Abdomen: Soft, nttp, no masses palpated   Extremities: No CT or edema " bilaterally    Neurologic: CN II - XII grossly intact     Pathology (5/9):  1.  ENDOCERVICAL CURETTINGS:  MINUTE FRAGMENTS OF UNREMARKABLE COLUMNAR EPITHELIUM.     2.  CERVICAL BIOPSY, 4 O'CLOCK POSITION:  CHRONIC CERVICITIS.     3.  CERVICAL BIOPSY, 11 O'CLOCK POSITION:  MODERATE EPITHELIAL DYSPLASIA.    Assessment/Plan     ASSESSMENT  1. Dysplasia of cervix, high grade MARILEE 2        PLAN  1. MARILEE 2  - Results of colpo discussed with patient, discussed recommendation for excisional procedure  - Discussed that ASCCP guidelines recommended either proceed with LEEP.   Discussed the risk of LEEP including bleeding, scaring, and pain. Discussed that additional procedures may be needed. Discussed risk of PTL/PTB.    - Discussed with the patient the risk to any surgery in general, including but not limited to death, risk of anesthesia, for which patient will discuss more with anesthesia the day of surgery, the potential development of a blood clot, and the potential development of a urinary tract infection or need for prolonged use of a indwelling catheter.  Discussed the potential for developing muscle weakness or nerve injury due to positioning during surgery.  Discussed the possibility of needed a blood transfusion.      Discussed recovery times and what to expect following surgery.  All questions and concerns were addressed.     Orders Placed This Encounter   Procedures   • Pregnancy, Urine - Urine, Clean Catch   • Follow Anesthesia Guidelines / Standing Orders   • Type and screen   • CBC and Differential       Adrienne Diego MD  5/23/2018

## 2018-05-29 ENCOUNTER — HOSPITAL ENCOUNTER (OUTPATIENT)
Facility: HOSPITAL | Age: 38
Setting detail: HOSPITAL OUTPATIENT SURGERY
End: 2018-05-29
Attending: OBSTETRICS & GYNECOLOGY | Admitting: OBSTETRICS & GYNECOLOGY

## 2018-05-29 PROBLEM — N87.1 DYSPLASIA OF CERVIX, HIGH GRADE CIN 2: Status: ACTIVE | Noted: 2018-05-29

## 2018-06-05 ENCOUNTER — APPOINTMENT (OUTPATIENT)
Dept: PREADMISSION TESTING | Facility: HOSPITAL | Age: 38
End: 2018-06-05

## 2018-06-07 ENCOUNTER — APPOINTMENT (OUTPATIENT)
Dept: PREADMISSION TESTING | Facility: HOSPITAL | Age: 38
End: 2018-06-07

## 2018-06-07 VITALS
BODY MASS INDEX: 41.59 KG/M2 | WEIGHT: 265 LBS | OXYGEN SATURATION: 98 % | DIASTOLIC BLOOD PRESSURE: 78 MMHG | RESPIRATION RATE: 14 BRPM | HEIGHT: 67 IN | SYSTOLIC BLOOD PRESSURE: 120 MMHG | HEART RATE: 78 BPM

## 2018-06-07 DIAGNOSIS — N87.1 DYSPLASIA OF CERVIX, HIGH GRADE CIN 2: ICD-10-CM

## 2018-06-07 LAB
ABO GROUP BLD: NORMAL
B-HCG UR QL: NEGATIVE
BASOPHILS # BLD AUTO: 0.02 10*3/MM3 (ref 0–0.2)
BASOPHILS NFR BLD AUTO: 0.2 % (ref 0–2)
BLD GP AB SCN SERPL QL: NEGATIVE
DEPRECATED RDW RBC AUTO: 43.6 FL (ref 36.4–46.3)
EOSINOPHIL # BLD AUTO: 0.1 10*3/MM3 (ref 0–0.7)
EOSINOPHIL NFR BLD AUTO: 1.2 % (ref 0–7)
ERYTHROCYTE [DISTWIDTH] IN BLOOD BY AUTOMATED COUNT: 13.3 % (ref 11.5–14.5)
HCT VFR BLD AUTO: 36.3 % (ref 35–45)
HGB BLD-MCNC: 12.3 G/DL (ref 12–15.5)
IMM GRANULOCYTES # BLD: 0.01 10*3/MM3 (ref 0–0.02)
IMM GRANULOCYTES NFR BLD: 0.1 % (ref 0–0.5)
LYMPHOCYTES # BLD AUTO: 2.93 10*3/MM3 (ref 0.6–4.2)
LYMPHOCYTES NFR BLD AUTO: 35.8 % (ref 10–50)
Lab: NORMAL
MCH RBC QN AUTO: 30.4 PG (ref 26.5–34)
MCHC RBC AUTO-ENTMCNC: 33.9 G/DL (ref 31.4–36)
MCV RBC AUTO: 89.6 FL (ref 80–98)
MONOCYTES # BLD AUTO: 0.52 10*3/MM3 (ref 0–0.9)
MONOCYTES NFR BLD AUTO: 6.3 % (ref 0–12)
NEUTROPHILS # BLD AUTO: 4.61 10*3/MM3 (ref 2–8.6)
NEUTROPHILS NFR BLD AUTO: 56.4 % (ref 37–80)
PLATELET # BLD AUTO: 213 10*3/MM3 (ref 150–450)
PMV BLD AUTO: 9.8 FL (ref 8–12)
RBC # BLD AUTO: 4.05 10*6/MM3 (ref 3.77–5.16)
RH BLD: POSITIVE
T&S EXPIRATION DATE: NORMAL
WBC NRBC COR # BLD: 8.19 10*3/MM3 (ref 3.2–9.8)

## 2018-06-07 PROCEDURE — 86850 RBC ANTIBODY SCREEN: CPT | Performed by: OBSTETRICS & GYNECOLOGY

## 2018-06-07 PROCEDURE — 86900 BLOOD TYPING SEROLOGIC ABO: CPT | Performed by: OBSTETRICS & GYNECOLOGY

## 2018-06-07 PROCEDURE — 86901 BLOOD TYPING SEROLOGIC RH(D): CPT | Performed by: OBSTETRICS & GYNECOLOGY

## 2018-06-07 PROCEDURE — 85025 COMPLETE CBC W/AUTO DIFF WBC: CPT | Performed by: OBSTETRICS & GYNECOLOGY

## 2018-06-07 PROCEDURE — 36415 COLL VENOUS BLD VENIPUNCTURE: CPT

## 2018-06-07 PROCEDURE — 81025 URINE PREGNANCY TEST: CPT | Performed by: OBSTETRICS & GYNECOLOGY

## 2018-06-07 RX ORDER — OMEGA-3 FATTY ACIDS/FISH OIL 300-1000MG
2 CAPSULE ORAL AS NEEDED
COMMUNITY
End: 2018-07-31 | Stop reason: HOSPADM

## 2018-06-07 RX ORDER — SODIUM CHLORIDE, SODIUM GLUCONATE, SODIUM ACETATE, POTASSIUM CHLORIDE, AND MAGNESIUM CHLORIDE 526; 502; 368; 37; 30 MG/100ML; MG/100ML; MG/100ML; MG/100ML; MG/100ML
1000 INJECTION, SOLUTION INTRAVENOUS CONTINUOUS
Status: CANCELLED | OUTPATIENT
Start: 2018-06-12

## 2018-06-07 RX ORDER — MELOXICAM 15 MG/1
15 TABLET ORAL DAILY PRN
COMMUNITY
End: 2019-03-21

## 2018-06-07 NOTE — DISCHARGE INSTRUCTIONS
[facility]  Pre-op Information and Guidelines    You will be called after 2 p.m. the day before your surgery (Friday for Monday surgery) and notified of your time for arrival and approximate surgery time.  If you have not received a call by 4P.M., please contact Same Day Surgery at [phone] of if outside [facility] call [phone].    Please Follow these Important Safety Guidelines:    • The morning of your procedure, take only the medications listed below with   A sip of water:_____________________________________________       ______________________________________________    • DO NOT eat or drink anything after 12:00 midnight the night before surgery  Specific instructions concerning drinking clear liquids will be discussed during  the pre-surgery instruction call the day before your surgery.    • If you take a blood thinner (ex. Plavix, Coumadin, aspirin), ask your doctor when to stop it before surgery  STOP DATE: _________________    • Only 2 visitors are allowed in patient rooms at a time  Your visitors will be asked to wait in the lobby until the admission process is complete with the exception of a parent with a child and patients in need of special assistance.    • YOU CANNOT DRIVE YOURSELF HOME  You must be accompanied by someone who will be responsible for driving you home after surgery and for your care at home.    • DO NOT chew gum, use breath mints, hard candy, or smoke the day of surgery  • DO NOT drink alcohol for at least 24 hours before your surgery  • DO NOT wear any jewelry and remove all body piercing before coming to the hospital  • DO NOT wear make-up to the hospital  • If you are having surgery on an extremity (arm/leg/foot) remove nail polish/artificial nails on the surgical side  • Clothing, glasses, contacts, dentures, and hairpieces must be removed before surgery  • Bathe the night before or the morning of your surgery and do not use powders/lotions on  skin.

## 2018-07-20 ENCOUNTER — OFFICE VISIT (OUTPATIENT)
Dept: OBSTETRICS AND GYNECOLOGY | Facility: CLINIC | Age: 38
End: 2018-07-20

## 2018-07-20 VITALS
DIASTOLIC BLOOD PRESSURE: 64 MMHG | HEIGHT: 67 IN | SYSTOLIC BLOOD PRESSURE: 114 MMHG | WEIGHT: 270 LBS | BODY MASS INDEX: 42.38 KG/M2

## 2018-07-20 DIAGNOSIS — N87.1 DYSPLASIA OF CERVIX, HIGH GRADE CIN 2: Primary | ICD-10-CM

## 2018-07-20 DIAGNOSIS — D06.9 CIN III (CERVICAL INTRAEPITHELIAL NEOPLASIA GRADE III) WITH SEVERE DYSPLASIA: Primary | ICD-10-CM

## 2018-07-20 PROCEDURE — 99213 OFFICE O/P EST LOW 20 MIN: CPT | Performed by: OBSTETRICS & GYNECOLOGY

## 2018-07-20 RX ORDER — NYSTATIN 100000 [USP'U]/G
POWDER TOPICAL 3 TIMES DAILY
Qty: 30 G | Refills: 0 | Status: SHIPPED | OUTPATIENT
Start: 2018-07-20 | End: 2019-03-23 | Stop reason: HOSPADM

## 2018-07-20 RX ORDER — SODIUM CHLORIDE 0.9 % (FLUSH) 0.9 %
1-10 SYRINGE (ML) INJECTION AS NEEDED
Status: CANCELLED | OUTPATIENT
Start: 2018-07-31 | End: 2019-07-31

## 2018-07-20 RX ORDER — DIAPER,BRIEF,INFANT-TODD,DISP
EACH MISCELLANEOUS DAILY
Qty: 14 G | Refills: 0 | Status: SHIPPED | OUTPATIENT
Start: 2018-07-20 | End: 2019-03-21

## 2018-07-20 NOTE — PROGRESS NOTES
Subjective     Chief Complaint   Patient presents with   • Pre-op Exam       Maria E Red is a 37 y.o.   Presents today for pre op visit.  Patient was intiailly scheduled for LEEP at beginning of , however, had to cancel due to her period.  States she started her period on Wednesday and will not be on her period at the time of the rescheduled case.  No new complaints or concerns.  No discharge.  No pelvic pain.  Still would like to have LEEP done.     Past Medical History:   Diagnosis Date   • Anxiety    • Anxiety and depression    • Atypical squamous cells of undetermined significance (ASC-US) on cervical Pap smear    • Back pain     back spasms and leg spasms   • Backache    • Bronchitis    • Constipation    • Depression    • Encounter for  visit    • Encounter for routine gynecological examination    • Epigastric pain    • Exanthematous disorder    • GERD (gastroesophageal reflux disease)    • HPV (human papilloma virus) infection    • Hypertensive disorder     resolved (with pregnancy)   • IBS (irritable bowel syndrome)    • Low back pain    • Migraine    • Obesity    • Pap smear abnormality of cervix     grade 2 dysplagia   • PCOS (polycystic ovarian syndrome)    • Polycystic ovaries    • Psoriasis    • Psychogenic pruritus    • Surgery follow-up      Past Surgical History:   Procedure Laterality Date   •  SECTION  2012    primary   • CHOLECYSTECTOMY     • FRACTURE SURGERY      boxers fracture-2016     Social History     Social History   • Marital status:      Spouse name: N/A   • Number of children: N/A   • Years of education: N/A     Occupational History   • Not on file.     Social History Main Topics   • Smoking status: Former Smoker     Quit date: 10/1/2016   • Smokeless tobacco: Never Used   • Alcohol use Yes      Comment: occational    • Drug use: No   • Sexual activity: Yes     Partners: Male     Other Topics Concern   • Not on file     Social History  Narrative   • No narrative on file     Family History   Problem Relation Age of Onset   • Hypertension Father    • Alcohol abuse Father    • Arthritis Father    • Depression Father    • Hyperlipidemia Father    • Arthritis Mother    • Depression Mother    • Early death Mother    • Depression Sister    • Drug abuse Brother    • Arthritis Maternal Grandmother    • COPD Maternal Grandmother    • Cancer Maternal Grandmother    • Depression Maternal Grandmother    • Diabetes Maternal Grandmother    • Heart disease Maternal Grandmother    • Hypertension Maternal Grandmother    • Thyroid disease Maternal Grandmother    • Lymphoma Maternal Grandmother    • Arthritis Maternal Grandfather    • Hypertension Maternal Grandfather    • Arthritis Paternal Grandmother    • Depression Paternal Grandmother    • Heart disease Paternal Grandmother    • Hypertension Paternal Grandmother    • Cancer Paternal Grandmother    • Breast cancer Paternal Grandmother    • Arthritis Paternal Grandfather    • Heart disease Paternal Grandfather    • Hypertension Paternal Grandfather    • Kidney disease Paternal Uncle    • Learning disabilities Neg Hx      Current Outpatient Prescriptions on File Prior to Visit   Medication Sig   • cetirizine (zyrTEC) 10 MG tablet Take 10 mg by mouth Daily.   • meloxicam (MOBIC) 15 MG tablet Take 15 mg by mouth Daily As Needed.   • methocarbamol (ROBAXIN) 750 MG tablet Take 1 tablet by mouth 4 (Four) Times a Day As Needed for Muscle Spasms.   • Multiple Vitamins-Minerals (MULTIVITAMIN ADULT PO) Take 1 tablet by mouth Daily.   • omeprazole (priLOSEC) 20 MG capsule Take 20 mg by mouth Daily.   • terbinafine (LAMISIL) 250 MG tablet Take 1 tablet by mouth Daily.   • traZODone (DESYREL) 50 MG tablet Take 1 tablet by mouth 2 (Two) Times a Day.   • Acetaminophen (TYLENOL) 325 MG capsule Take 2 tablets by mouth As Needed.   • Ibuprofen 200 MG capsule Take 2 tablets by mouth As Needed.     No current facility-administered  "medications on file prior to visit.      Allergies   Allergen Reactions   • Codeine Confusion and Hallucinations   • Dilaudid [Hydromorphone Hcl] Nausea And Vomiting     Heart palpitations and hives     Review of Systems - comprehensive ROS performed and all negative.      Objective      /64   Ht 170.2 cm (67\")   Wt 122 kg (270 lb)   BMI 42.29 kg/m²     Physical Exam  General:  appears stated age, AAOx3, NAD   Heart: RRR no m/r/g   Lungs: CTAB   Abdomen: Soft, nttp, no masses palpated; yeast noted in fold of pannus   Extremities: No CT or edema bilaterally    Neurologic: CN II - XII grossly intact     Pathology (5/9):  1.  ENDOCERVICAL CURETTINGS:  MINUTE FRAGMENTS OF UNREMARKABLE COLUMNAR EPITHELIUM.     2.  CERVICAL BIOPSY, 4 O'CLOCK POSITION:  CHRONIC CERVICITIS.     3.  CERVICAL BIOPSY, 11 O'CLOCK POSITION:  MODERATE EPITHELIAL DYSPLASIA.    Assessment/Plan     ASSESSMENT  1. Dysplasia of cervix, high grade MARILEE 2        PLAN  1. MARILEE 2  - LEEP rescheduled due to cycle last time.    - Again reviewed, ASCCP guidelines recommended either proceed with LEEP.   Discussed the risk of LEEP including bleeding, scaring, and pain. Discussed that additional procedures may be needed. Discussed risk of PTL/PTB.    - Discussed with the patient the risk to any surgery in general, including but not limited to death, risk of anesthesia, for which patient will discuss more with anesthesia the day of surgery, the potential development of a blood clot, and the potential development of a urinary tract infection or need for prolonged use of a indwelling catheter.  Discussed the potential for developing muscle weakness or nerve injury due to positioning during surgery.  Discussed the possibility of needed a blood transfusion.       Discussed recovery times and what to expect following surgery.  All questions and concerns were addressed.   LEEP scheduled for 7/31.     Nystatin powder and hydrocortisone sent in for yeast under " pannus        New Medications Ordered This Visit   Medications   • nystatin (MYCOSTATIN) 779615 UNIT/GM powder     Sig: Apply  topically 3 (Three) Times a Day.     Dispense:  30 g     Refill:  0   • hydrocortisone 1 % cream     Sig: Apply  topically Daily. To affected area     Dispense:  14 g     Refill:  0       Adrienne YAO Friday, MD  7/20/2018

## 2018-07-24 ENCOUNTER — APPOINTMENT (OUTPATIENT)
Dept: PREADMISSION TESTING | Facility: HOSPITAL | Age: 38
End: 2018-07-24

## 2018-07-24 VITALS
HEIGHT: 67 IN | WEIGHT: 260 LBS | BODY MASS INDEX: 40.81 KG/M2 | HEART RATE: 65 BPM | RESPIRATION RATE: 16 BRPM | OXYGEN SATURATION: 99 % | SYSTOLIC BLOOD PRESSURE: 110 MMHG | DIASTOLIC BLOOD PRESSURE: 60 MMHG

## 2018-07-24 DIAGNOSIS — D06.9 CIN III (CERVICAL INTRAEPITHELIAL NEOPLASIA GRADE III) WITH SEVERE DYSPLASIA: ICD-10-CM

## 2018-07-24 LAB
ABO GROUP BLD: NORMAL
BASOPHILS # BLD AUTO: 0.01 10*3/MM3 (ref 0–0.2)
BASOPHILS NFR BLD AUTO: 0.1 % (ref 0–2)
BLD GP AB SCN SERPL QL: NEGATIVE
DEPRECATED RDW RBC AUTO: 43.7 FL (ref 36.4–46.3)
EOSINOPHIL # BLD AUTO: 0.1 10*3/MM3 (ref 0–0.7)
EOSINOPHIL NFR BLD AUTO: 1.2 % (ref 0–7)
ERYTHROCYTE [DISTWIDTH] IN BLOOD BY AUTOMATED COUNT: 13.3 % (ref 11.5–14.5)
HCT VFR BLD AUTO: 39.6 % (ref 35–45)
HGB BLD-MCNC: 13.5 G/DL (ref 12–15.5)
IMM GRANULOCYTES # BLD: 0.01 10*3/MM3 (ref 0–0.02)
IMM GRANULOCYTES NFR BLD: 0.1 % (ref 0–0.5)
LYMPHOCYTES # BLD AUTO: 3.08 10*3/MM3 (ref 0.6–4.2)
LYMPHOCYTES NFR BLD AUTO: 37.3 % (ref 10–50)
Lab: NORMAL
MCH RBC QN AUTO: 30.3 PG (ref 26.5–34)
MCHC RBC AUTO-ENTMCNC: 34.1 G/DL (ref 31.4–36)
MCV RBC AUTO: 89 FL (ref 80–98)
MONOCYTES # BLD AUTO: 0.57 10*3/MM3 (ref 0–0.9)
MONOCYTES NFR BLD AUTO: 6.9 % (ref 0–12)
NEUTROPHILS # BLD AUTO: 4.49 10*3/MM3 (ref 2–8.6)
NEUTROPHILS NFR BLD AUTO: 54.4 % (ref 37–80)
PLATELET # BLD AUTO: 243 10*3/MM3 (ref 150–450)
PMV BLD AUTO: 10.2 FL (ref 8–12)
RBC # BLD AUTO: 4.45 10*6/MM3 (ref 3.77–5.16)
RH BLD: POSITIVE
T&S EXPIRATION DATE: NORMAL
WBC NRBC COR # BLD: 8.26 10*3/MM3 (ref 3.2–9.8)

## 2018-07-24 PROCEDURE — 86901 BLOOD TYPING SEROLOGIC RH(D): CPT | Performed by: OBSTETRICS & GYNECOLOGY

## 2018-07-24 PROCEDURE — 86850 RBC ANTIBODY SCREEN: CPT | Performed by: OBSTETRICS & GYNECOLOGY

## 2018-07-24 PROCEDURE — 86900 BLOOD TYPING SEROLOGIC ABO: CPT | Performed by: OBSTETRICS & GYNECOLOGY

## 2018-07-24 PROCEDURE — 85025 COMPLETE CBC W/AUTO DIFF WBC: CPT | Performed by: OBSTETRICS & GYNECOLOGY

## 2018-07-24 PROCEDURE — 36415 COLL VENOUS BLD VENIPUNCTURE: CPT

## 2018-07-24 RX ORDER — TRAZODONE HYDROCHLORIDE 50 MG/1
50 TABLET ORAL NIGHTLY
COMMUNITY
End: 2019-06-05

## 2018-07-24 RX ORDER — SODIUM CHLORIDE, SODIUM GLUCONATE, SODIUM ACETATE, POTASSIUM CHLORIDE, AND MAGNESIUM CHLORIDE 526; 502; 368; 37; 30 MG/100ML; MG/100ML; MG/100ML; MG/100ML; MG/100ML
1000 INJECTION, SOLUTION INTRAVENOUS CONTINUOUS
Status: CANCELLED | OUTPATIENT
Start: 2018-07-31 | End: 2019-07-31

## 2018-07-31 ENCOUNTER — ANESTHESIA EVENT (OUTPATIENT)
Dept: PERIOP | Facility: HOSPITAL | Age: 38
End: 2018-07-31

## 2018-07-31 ENCOUNTER — ANESTHESIA (OUTPATIENT)
Dept: PERIOP | Facility: HOSPITAL | Age: 38
End: 2018-07-31

## 2018-07-31 ENCOUNTER — HOSPITAL ENCOUNTER (OUTPATIENT)
Facility: HOSPITAL | Age: 38
Setting detail: HOSPITAL OUTPATIENT SURGERY
Discharge: HOME | End: 2018-07-31
Attending: OBSTETRICS & GYNECOLOGY | Admitting: ANESTHESIOLOGY

## 2018-07-31 VITALS
DIASTOLIC BLOOD PRESSURE: 70 MMHG | TEMPERATURE: 96.9 F | OXYGEN SATURATION: 100 % | HEIGHT: 67 IN | SYSTOLIC BLOOD PRESSURE: 118 MMHG | BODY MASS INDEX: 41.52 KG/M2 | RESPIRATION RATE: 18 BRPM | HEART RATE: 58 BPM | WEIGHT: 264.55 LBS

## 2018-07-31 DIAGNOSIS — D06.9 CIN III (CERVICAL INTRAEPITHELIAL NEOPLASIA GRADE III) WITH SEVERE DYSPLASIA: ICD-10-CM

## 2018-07-31 DIAGNOSIS — N87.1 DYSPLASIA OF CERVIX, HIGH GRADE CIN 2: Primary | ICD-10-CM

## 2018-07-31 LAB
ABO GROUP BLD: NORMAL
B-HCG UR QL: NEGATIVE
BLD GP AB SCN SERPL QL: NEGATIVE
Lab: NORMAL
RH BLD: POSITIVE
T&S EXPIRATION DATE: NORMAL

## 2018-07-31 PROCEDURE — 86850 RBC ANTIBODY SCREEN: CPT | Performed by: OBSTETRICS & GYNECOLOGY

## 2018-07-31 PROCEDURE — 25010000002 PROPOFOL 10 MG/ML EMULSION: Performed by: NURSE ANESTHETIST, CERTIFIED REGISTERED

## 2018-07-31 PROCEDURE — 86901 BLOOD TYPING SEROLOGIC RH(D): CPT | Performed by: OBSTETRICS & GYNECOLOGY

## 2018-07-31 PROCEDURE — 25010000002 ONDANSETRON PER 1 MG: Performed by: NURSE ANESTHETIST, CERTIFIED REGISTERED

## 2018-07-31 PROCEDURE — 25010000002 DEXAMETHASONE PER 1 MG: Performed by: NURSE ANESTHETIST, CERTIFIED REGISTERED

## 2018-07-31 PROCEDURE — 88305 TISSUE EXAM BY PATHOLOGIST: CPT | Performed by: PATHOLOGY

## 2018-07-31 PROCEDURE — 57522 CONIZATION OF CERVIX: CPT | Performed by: OBSTETRICS & GYNECOLOGY

## 2018-07-31 PROCEDURE — 25010000002 FENTANYL CITRATE (PF) 100 MCG/2ML SOLUTION: Performed by: NURSE ANESTHETIST, CERTIFIED REGISTERED

## 2018-07-31 PROCEDURE — 25010000002 MIDAZOLAM PER 1 MG: Performed by: NURSE ANESTHETIST, CERTIFIED REGISTERED

## 2018-07-31 PROCEDURE — 88305 TISSUE EXAM BY PATHOLOGIST: CPT | Performed by: OBSTETRICS & GYNECOLOGY

## 2018-07-31 PROCEDURE — 86900 BLOOD TYPING SEROLOGIC ABO: CPT | Performed by: OBSTETRICS & GYNECOLOGY

## 2018-07-31 PROCEDURE — 81025 URINE PREGNANCY TEST: CPT | Performed by: OBSTETRICS & GYNECOLOGY

## 2018-07-31 RX ORDER — ONDANSETRON 2 MG/ML
INJECTION INTRAMUSCULAR; INTRAVENOUS AS NEEDED
Status: DISCONTINUED | OUTPATIENT
Start: 2018-07-31 | End: 2018-07-31 | Stop reason: SURG

## 2018-07-31 RX ORDER — PROPOFOL 10 MG/ML
VIAL (ML) INTRAVENOUS AS NEEDED
Status: DISCONTINUED | OUTPATIENT
Start: 2018-07-31 | End: 2018-07-31 | Stop reason: SURG

## 2018-07-31 RX ORDER — BUPIVACAINE HYDROCHLORIDE AND EPINEPHRINE 5; 5 MG/ML; UG/ML
INJECTION, SOLUTION EPIDURAL; INTRACAUDAL; PERINEURAL AS NEEDED
Status: DISCONTINUED | OUTPATIENT
Start: 2018-07-31 | End: 2018-07-31 | Stop reason: HOSPADM

## 2018-07-31 RX ORDER — DEXAMETHASONE SODIUM PHOSPHATE 4 MG/ML
INJECTION, SOLUTION INTRA-ARTICULAR; INTRALESIONAL; INTRAMUSCULAR; INTRAVENOUS; SOFT TISSUE AS NEEDED
Status: DISCONTINUED | OUTPATIENT
Start: 2018-07-31 | End: 2018-07-31 | Stop reason: SURG

## 2018-07-31 RX ORDER — DIPHENHYDRAMINE HYDROCHLORIDE 50 MG/ML
12.5 INJECTION INTRAMUSCULAR; INTRAVENOUS
Status: DISCONTINUED | OUTPATIENT
Start: 2018-07-31 | End: 2018-07-31 | Stop reason: HOSPADM

## 2018-07-31 RX ORDER — MEPERIDINE HYDROCHLORIDE 50 MG/ML
12.5 INJECTION INTRAMUSCULAR; INTRAVENOUS; SUBCUTANEOUS
Status: DISCONTINUED | OUTPATIENT
Start: 2018-07-31 | End: 2018-07-31 | Stop reason: HOSPADM

## 2018-07-31 RX ORDER — FENTANYL CITRATE 50 UG/ML
INJECTION, SOLUTION INTRAMUSCULAR; INTRAVENOUS AS NEEDED
Status: DISCONTINUED | OUTPATIENT
Start: 2018-07-31 | End: 2018-07-31 | Stop reason: SURG

## 2018-07-31 RX ORDER — NALOXONE HCL 0.4 MG/ML
0.2 VIAL (ML) INJECTION AS NEEDED
Status: DISCONTINUED | OUTPATIENT
Start: 2018-07-31 | End: 2018-07-31 | Stop reason: HOSPADM

## 2018-07-31 RX ORDER — MIDAZOLAM HYDROCHLORIDE 1 MG/ML
INJECTION INTRAMUSCULAR; INTRAVENOUS AS NEEDED
Status: DISCONTINUED | OUTPATIENT
Start: 2018-07-31 | End: 2018-07-31 | Stop reason: SURG

## 2018-07-31 RX ORDER — HYDROCODONE BITARTRATE AND ACETAMINOPHEN 5; 325 MG/1; MG/1
1-2 TABLET ORAL EVERY 4 HOURS PRN
Qty: 10 TABLET | Refills: 0 | Status: SHIPPED | OUTPATIENT
Start: 2018-07-31 | End: 2018-08-17

## 2018-07-31 RX ORDER — SODIUM CHLORIDE, SODIUM GLUCONATE, SODIUM ACETATE, POTASSIUM CHLORIDE, AND MAGNESIUM CHLORIDE 526; 502; 368; 37; 30 MG/100ML; MG/100ML; MG/100ML; MG/100ML; MG/100ML
1000 INJECTION, SOLUTION INTRAVENOUS CONTINUOUS
Status: DISCONTINUED | OUTPATIENT
Start: 2018-07-31 | End: 2018-07-31 | Stop reason: HOSPADM

## 2018-07-31 RX ORDER — ACETAMINOPHEN 650 MG/1
650 SUPPOSITORY RECTAL ONCE AS NEEDED
Status: DISCONTINUED | OUTPATIENT
Start: 2018-07-31 | End: 2018-07-31 | Stop reason: HOSPADM

## 2018-07-31 RX ORDER — LIDOCAINE HYDROCHLORIDE 20 MG/ML
INJECTION, SOLUTION INFILTRATION; PERINEURAL AS NEEDED
Status: DISCONTINUED | OUTPATIENT
Start: 2018-07-31 | End: 2018-07-31 | Stop reason: SURG

## 2018-07-31 RX ORDER — FLUMAZENIL 0.1 MG/ML
0.2 INJECTION INTRAVENOUS AS NEEDED
Status: DISCONTINUED | OUTPATIENT
Start: 2018-07-31 | End: 2018-07-31 | Stop reason: HOSPADM

## 2018-07-31 RX ORDER — IBUPROFEN 600 MG/1
600 TABLET ORAL EVERY 6 HOURS PRN
Status: DISCONTINUED | OUTPATIENT
Start: 2018-07-31 | End: 2018-07-31 | Stop reason: HOSPADM

## 2018-07-31 RX ORDER — HYDROCODONE BITARTRATE AND ACETAMINOPHEN 5; 325 MG/1; MG/1
1 TABLET ORAL ONCE AS NEEDED
Status: DISCONTINUED | OUTPATIENT
Start: 2018-07-31 | End: 2018-07-31 | Stop reason: HOSPADM

## 2018-07-31 RX ORDER — SODIUM CHLORIDE 0.9 % (FLUSH) 0.9 %
1-10 SYRINGE (ML) INJECTION AS NEEDED
Status: DISCONTINUED | OUTPATIENT
Start: 2018-07-31 | End: 2018-07-31 | Stop reason: HOSPADM

## 2018-07-31 RX ORDER — IBUPROFEN 600 MG/1
600 TABLET ORAL EVERY 6 HOURS PRN
Qty: 20 TABLET | Refills: 0 | Status: SHIPPED | OUTPATIENT
Start: 2018-07-31 | End: 2019-03-21

## 2018-07-31 RX ORDER — LABETALOL HYDROCHLORIDE 5 MG/ML
5 INJECTION, SOLUTION INTRAVENOUS
Status: DISCONTINUED | OUTPATIENT
Start: 2018-07-31 | End: 2018-07-31 | Stop reason: HOSPADM

## 2018-07-31 RX ORDER — EPHEDRINE SULFATE 50 MG/ML
5 INJECTION, SOLUTION INTRAVENOUS ONCE AS NEEDED
Status: DISCONTINUED | OUTPATIENT
Start: 2018-07-31 | End: 2018-07-31 | Stop reason: HOSPADM

## 2018-07-31 RX ORDER — ONDANSETRON 2 MG/ML
4 INJECTION INTRAMUSCULAR; INTRAVENOUS ONCE AS NEEDED
Status: DISCONTINUED | OUTPATIENT
Start: 2018-07-31 | End: 2018-07-31 | Stop reason: HOSPADM

## 2018-07-31 RX ORDER — ACETAMINOPHEN 325 MG/1
650 TABLET ORAL ONCE AS NEEDED
Status: DISCONTINUED | OUTPATIENT
Start: 2018-07-31 | End: 2018-07-31 | Stop reason: HOSPADM

## 2018-07-31 RX ADMIN — FENTANYL CITRATE 50 MCG: 50 INJECTION, SOLUTION INTRAMUSCULAR; INTRAVENOUS at 07:13

## 2018-07-31 RX ADMIN — DEXAMETHASONE SODIUM PHOSPHATE 4 MG: 4 INJECTION, SOLUTION INTRAMUSCULAR; INTRAVENOUS at 07:24

## 2018-07-31 RX ADMIN — MIDAZOLAM 2 MG: 1 INJECTION INTRAMUSCULAR; INTRAVENOUS at 07:06

## 2018-07-31 RX ADMIN — PROPOFOL 150 MG: 10 INJECTION, EMULSION INTRAVENOUS at 07:13

## 2018-07-31 RX ADMIN — FENTANYL CITRATE 50 MCG: 50 INJECTION, SOLUTION INTRAMUSCULAR; INTRAVENOUS at 07:20

## 2018-07-31 RX ADMIN — ONDANSETRON 4 MG: 2 INJECTION INTRAMUSCULAR; INTRAVENOUS at 07:24

## 2018-07-31 RX ADMIN — SODIUM CHLORIDE, SODIUM GLUCONATE, SODIUM ACETATE, POTASSIUM CHLORIDE, AND MAGNESIUM CHLORIDE 1000 ML: 526; 502; 368; 37; 30 INJECTION, SOLUTION INTRAVENOUS at 06:29

## 2018-07-31 RX ADMIN — LIDOCAINE HYDROCHLORIDE 80 MG: 20 INJECTION, SOLUTION INFILTRATION; PERINEURAL at 07:13

## 2018-07-31 NOTE — ANESTHESIA POSTPROCEDURE EVALUATION
Patient: Maria E Red    Procedure Summary     Date:  07/31/18 Room / Location:  [facility] OR [address] [place] [address] OR    Anesthesia Start:  0708 Anesthesia Stop:  0756    Procedure:  LOOP ELECTROCAUTERY EXCISION PROCEDURE (N/A Cervix) Diagnosis:       Dysplasia of cervix, high grade MARILEE 2      (marilee 2)    Surgeon:  Adrienne Diego MD Provider:  Galo Recinos MD    Anesthesia Type:  general ASA Status:  3          Anesthesia Type: general  Last vitals  BP   131/71 (07/31/18 0630)   Temp   97.6 °F (36.4 °C) (07/31/18 0630)   Pulse   65 (07/31/18 0630)   Resp   18 (07/31/18 0630)     SpO2   99 % (07/31/18 0630)     Post Anesthesia Care and Evaluation    Patient location during evaluation: PACU  Patient participation: complete - patient participated  Level of consciousness: awake and alert  Pain score: 0  Pain management: adequate  Airway patency: patent  Anesthetic complications: No anesthetic complications  PONV Status: none  Cardiovascular status: acceptable  Respiratory status: acceptable  Hydration status: acceptable

## 2018-07-31 NOTE — ANESTHESIA PREPROCEDURE EVALUATION
Anesthesia Evaluation     no history of anesthetic complications:  NPO Solid Status: > 8 hours  NPO Liquid Status: > 8 hours           Airway   Mallampati: I  TM distance: >3 FB  Neck ROM: full  No difficulty expected  Dental          Pulmonary     breath sounds clear to auscultation  (-) not a smoker  Cardiovascular   Exercise tolerance: good (4-7 METS)    Rhythm: regular  Rate: normal    (-) angina      Neuro/Psych  (+) headaches (controlled), psychiatric history Depression and Anxiety,     GI/Hepatic/Renal/Endo    (+) morbid obesity, GERD well controlled,      Musculoskeletal     (+) back pain, myalgias,   Abdominal    Substance History   (+) alcohol use (occ),   (-) drug use     OB/GYN negative ob/gyn ROS   (-)  Pregnant        Other      history of cancer (cervical dysplasia)                    Anesthesia Plan    ASA 3     general   (No dilaudid)  intravenous induction   Anesthetic plan and risks discussed with patient and spouse/significant other.

## 2018-07-31 NOTE — ANESTHESIA PROCEDURE NOTES
Airway  Urgency: elective    Airway not difficult    General Information and Staff    Patient location during procedure: OR  CRNA: UVALDO COPELAND    Indications and Patient Condition  Indications for airway management: airway protection    Preoxygenated: yes  Mask difficulty assessment: 1 - vent by mask    Final Airway Details  Final airway type: supraglottic airway      Successful airway: I-gel  Size 4    Number of attempts at approach: 1

## 2018-08-01 LAB
LAB AP CASE REPORT: NORMAL
LAB AP INTRADEPARTMENTAL CONSULT: NORMAL
PATH REPORT.FINAL DX SPEC: NORMAL
PATH REPORT.GROSS SPEC: NORMAL

## 2018-08-17 ENCOUNTER — OFFICE VISIT (OUTPATIENT)
Dept: OBSTETRICS AND GYNECOLOGY | Facility: CLINIC | Age: 38
End: 2018-08-17

## 2018-08-17 VITALS
DIASTOLIC BLOOD PRESSURE: 72 MMHG | SYSTOLIC BLOOD PRESSURE: 126 MMHG | HEIGHT: 67 IN | BODY MASS INDEX: 41.44 KG/M2 | WEIGHT: 264 LBS

## 2018-08-17 DIAGNOSIS — Z98.890 POST-OPERATIVE STATE: Primary | ICD-10-CM

## 2018-08-17 PROCEDURE — 99024 POSTOP FOLLOW-UP VISIT: CPT | Performed by: OBSTETRICS & GYNECOLOGY

## 2018-08-17 NOTE — PROGRESS NOTES
"Subjective     Chief Complaint   Patient presents with   • Post-op       Maria E Red is a 38 y.o.   Presents today for a post op visit after a LEEP on  for CIN2.  Had some discharge the first day following surgery but not discharge.  No bleeding.  No cramping, pelvic pain.  No issues with urination or bowel movements.      No changes to PMH, PSH, family or social history since last visit.  No new medications or allergies.     Objective      /72   Ht 170.2 cm (67\")   Wt 120 kg (264 lb)   LMP 2018 (Exact Date)   BMI 41.35 kg/m²     Physical Exam  General:   Appears stated age, AAOx3, NAD   Abdomen: Soft, nttp, no masses palpated   Extremities: No CT or edema bilaterally    Neurologic: CN II - XII grossly intact     Pathology ():  Final Diagnosis   1.  ANTERIOR LIP OF UTERINE CERVIX:  FOCAL MILD DYSPLASIA (MARILEE I), ENDOCERVICAL MARGIN FREE OF DYSPLASIA.     2.  RIGHT SIDE OF CERVIX (11 O'CLOCK):  UNREMARKABLE SQUAMOUS MUCOSA.     3.  CURETTINGS, ENDOCERVIX:  UNREMARKABLE COLUMNAR CELL EPITHELIUM AND SQUAMOUS EPITHELIUM.     Assessment/Plan     ASSESSMENT  1. Post-operative state        PLAN  1. Post op state  - All post op restrictions lifted  - Pathology reviewed with patient, copy given   - RTC in 1 year with repeat pap at that time.      Adrienne Diego MD  2018           "

## 2019-06-05 ENCOUNTER — OFFICE VISIT (OUTPATIENT)
Dept: FAMILY MEDICINE CLINIC | Facility: CLINIC | Age: 39
End: 2019-06-05

## 2019-06-05 VITALS
DIASTOLIC BLOOD PRESSURE: 88 MMHG | BODY MASS INDEX: 42.53 KG/M2 | HEIGHT: 67 IN | OXYGEN SATURATION: 98 % | WEIGHT: 271 LBS | SYSTOLIC BLOOD PRESSURE: 118 MMHG | HEART RATE: 80 BPM | TEMPERATURE: 97.6 F

## 2019-06-05 DIAGNOSIS — Z00.00 GENERAL MEDICAL EXAM: ICD-10-CM

## 2019-06-05 DIAGNOSIS — F39 MOOD DISORDER (HCC): ICD-10-CM

## 2019-06-05 DIAGNOSIS — Z12.39 BREAST CANCER SCREENING: ICD-10-CM

## 2019-06-05 DIAGNOSIS — E53.8 LOW SERUM VITAMIN B12: ICD-10-CM

## 2019-06-05 DIAGNOSIS — F41.9 ANXIETY: ICD-10-CM

## 2019-06-05 DIAGNOSIS — K21.9 GASTROESOPHAGEAL REFLUX DISEASE, ESOPHAGITIS PRESENCE NOT SPECIFIED: Primary | ICD-10-CM

## 2019-06-05 DIAGNOSIS — E55.9 VITAMIN D DEFICIENCY: ICD-10-CM

## 2019-06-05 PROBLEM — N87.1 DYSPLASIA OF CERVIX, HIGH GRADE CIN 2: Chronic | Status: ACTIVE | Noted: 2018-05-29

## 2019-06-05 PROCEDURE — 99214 OFFICE O/P EST MOD 30 MIN: CPT | Performed by: NURSE PRACTITIONER

## 2019-06-05 RX ORDER — OMEPRAZOLE 20 MG/1
20 CAPSULE, DELAYED RELEASE ORAL NIGHTLY
Qty: 90 CAPSULE | Refills: 1 | Status: SHIPPED | OUTPATIENT
Start: 2019-06-05

## 2019-06-10 ENCOUNTER — LAB (OUTPATIENT)
Dept: LAB | Facility: OTHER | Age: 39
End: 2019-06-10

## 2019-06-10 DIAGNOSIS — E53.8 LOW SERUM VITAMIN B12: ICD-10-CM

## 2019-06-10 DIAGNOSIS — Z00.00 GENERAL MEDICAL EXAM: ICD-10-CM

## 2019-06-10 DIAGNOSIS — E55.9 VITAMIN D DEFICIENCY: ICD-10-CM

## 2019-06-10 LAB
ALBUMIN SERPL-MCNC: 3.9 G/DL (ref 3.5–5)
ALBUMIN/GLOB SERPL: 1.3 G/DL (ref 1.1–1.8)
ALP SERPL-CCNC: 72 U/L (ref 38–126)
ALT SERPL W P-5'-P-CCNC: 47 U/L
ANION GAP SERPL CALCULATED.3IONS-SCNC: 8 MMOL/L (ref 5–15)
AST SERPL-CCNC: 34 U/L (ref 14–36)
BASOPHILS # BLD AUTO: 0.02 10*3/MM3 (ref 0–0.2)
BASOPHILS NFR BLD AUTO: 0.3 % (ref 0–1.5)
BILIRUB SERPL-MCNC: 1 MG/DL (ref 0.2–1.3)
BUN BLD-MCNC: 10 MG/DL (ref 7–23)
BUN/CREAT SERPL: 16.1 (ref 7–25)
CALCIUM SPEC-SCNC: 9.1 MG/DL (ref 8.4–10.2)
CHLORIDE SERPL-SCNC: 103 MMOL/L (ref 101–112)
CHOLEST SERPL-MCNC: 136 MG/DL (ref 150–200)
CO2 SERPL-SCNC: 27 MMOL/L (ref 22–30)
CREAT BLD-MCNC: 0.62 MG/DL (ref 0.52–1.04)
DEPRECATED RDW RBC AUTO: 42.6 FL (ref 37–54)
EOSINOPHIL # BLD AUTO: 0.08 10*3/MM3 (ref 0–0.4)
EOSINOPHIL NFR BLD AUTO: 1.1 % (ref 0.3–6.2)
ERYTHROCYTE [DISTWIDTH] IN BLOOD BY AUTOMATED COUNT: 13 % (ref 12.3–15.4)
GFR SERPL CREATININE-BSD FRML MDRD: 108 ML/MIN/1.73 (ref 64–149)
GLOBULIN UR ELPH-MCNC: 2.9 GM/DL (ref 2.3–3.5)
GLUCOSE BLD-MCNC: 101 MG/DL (ref 70–99)
HCT VFR BLD AUTO: 38.9 % (ref 34–46.6)
HDLC SERPL-MCNC: 39 MG/DL (ref 40–59)
HGB BLD-MCNC: 13.3 G/DL (ref 12–15.9)
LDLC SERPL CALC-MCNC: 81 MG/DL
LDLC/HDLC SERPL: 2.08 {RATIO} (ref 0–3.22)
LYMPHOCYTES # BLD AUTO: 2.8 10*3/MM3 (ref 0.7–3.1)
LYMPHOCYTES NFR BLD AUTO: 38.6 % (ref 19.6–45.3)
MCH RBC QN AUTO: 31.4 PG (ref 26.6–33)
MCHC RBC AUTO-ENTMCNC: 34.2 G/DL (ref 31.5–35.7)
MCV RBC AUTO: 92 FL (ref 79–97)
MONOCYTES # BLD AUTO: 0.48 10*3/MM3 (ref 0.1–0.9)
MONOCYTES NFR BLD AUTO: 6.6 % (ref 5–12)
NEUTROPHILS # BLD AUTO: 3.87 10*3/MM3 (ref 1.7–7)
NEUTROPHILS NFR BLD AUTO: 53.4 % (ref 42.7–76)
PLATELET # BLD AUTO: 230 10*3/MM3 (ref 140–450)
PMV BLD AUTO: 9.4 FL (ref 6–12)
POTASSIUM BLD-SCNC: 4.1 MMOL/L (ref 3.4–5)
PROT SERPL-MCNC: 6.8 G/DL (ref 6.3–8.6)
RBC # BLD AUTO: 4.23 10*6/MM3 (ref 3.77–5.28)
SODIUM BLD-SCNC: 138 MMOL/L (ref 137–145)
TRIGL SERPL-MCNC: 80 MG/DL
VLDLC SERPL-MCNC: 16 MG/DL
WBC NRBC COR # BLD: 7.25 10*3/MM3 (ref 3.4–10.8)

## 2019-06-10 PROCEDURE — 84443 ASSAY THYROID STIM HORMONE: CPT | Performed by: NURSE PRACTITIONER

## 2019-06-10 PROCEDURE — 80053 COMPREHEN METABOLIC PANEL: CPT | Performed by: NURSE PRACTITIONER

## 2019-06-10 PROCEDURE — 36415 COLL VENOUS BLD VENIPUNCTURE: CPT | Performed by: NURSE PRACTITIONER

## 2019-06-10 PROCEDURE — 83036 HEMOGLOBIN GLYCOSYLATED A1C: CPT | Performed by: NURSE PRACTITIONER

## 2019-06-10 PROCEDURE — 82607 VITAMIN B-12: CPT | Performed by: NURSE PRACTITIONER

## 2019-06-10 PROCEDURE — 85025 COMPLETE CBC W/AUTO DIFF WBC: CPT | Performed by: NURSE PRACTITIONER

## 2019-06-10 PROCEDURE — 82306 VITAMIN D 25 HYDROXY: CPT | Performed by: NURSE PRACTITIONER

## 2019-06-10 PROCEDURE — 84439 ASSAY OF FREE THYROXINE: CPT | Performed by: NURSE PRACTITIONER

## 2019-06-10 PROCEDURE — 80061 LIPID PANEL: CPT | Performed by: NURSE PRACTITIONER

## 2019-06-11 LAB
25(OH)D3 SERPL-MCNC: 19.2 NG/ML (ref 30–100)
BILIRUB UR QL STRIP: NEGATIVE
CLARITY UR: CLEAR
COLOR UR: YELLOW
GLUCOSE UR STRIP-MCNC: NEGATIVE MG/DL
HBA1C MFR BLD: 5.24 % (ref 4.8–5.6)
HGB UR QL STRIP.AUTO: NEGATIVE
KETONES UR QL STRIP: NEGATIVE
LEUKOCYTE ESTERASE UR QL STRIP.AUTO: NEGATIVE
NITRITE UR QL STRIP: NEGATIVE
PH UR STRIP.AUTO: 6 [PH] (ref 5.5–8)
PROT UR QL STRIP: NEGATIVE
SP GR UR STRIP: 1.02 (ref 1–1.03)
T4 FREE SERPL-MCNC: 1.14 NG/DL (ref 0.93–1.7)
TSH SERPL DL<=0.05 MIU/L-ACNC: 2.75 MIU/ML (ref 0.27–4.2)
UROBILINOGEN UR QL STRIP: NORMAL
VIT B12 BLD-MCNC: 549 PG/ML (ref 211–946)

## 2019-06-11 PROCEDURE — 81003 URINALYSIS AUTO W/O SCOPE: CPT | Performed by: NURSE PRACTITIONER

## 2019-06-19 ENCOUNTER — OFFICE VISIT (OUTPATIENT)
Dept: FAMILY MEDICINE CLINIC | Facility: CLINIC | Age: 39
End: 2019-06-19

## 2019-06-19 VITALS
TEMPERATURE: 97.1 F | DIASTOLIC BLOOD PRESSURE: 80 MMHG | BODY MASS INDEX: 42.53 KG/M2 | OXYGEN SATURATION: 96 % | WEIGHT: 271 LBS | SYSTOLIC BLOOD PRESSURE: 118 MMHG | HEIGHT: 67 IN | HEART RATE: 73 BPM

## 2019-06-19 DIAGNOSIS — E55.9 VITAMIN D DEFICIENCY: Primary | ICD-10-CM

## 2019-06-19 DIAGNOSIS — F33.9 EPISODE OF RECURRENT MAJOR DEPRESSIVE DISORDER, UNSPECIFIED DEPRESSION EPISODE SEVERITY (HCC): ICD-10-CM

## 2019-06-19 DIAGNOSIS — F41.9 ANXIETY: ICD-10-CM

## 2019-06-19 PROCEDURE — 99214 OFFICE O/P EST MOD 30 MIN: CPT | Performed by: NURSE PRACTITIONER

## 2019-06-19 RX ORDER — ERGOCALCIFEROL 1.25 MG/1
CAPSULE ORAL
Qty: 24 CAPSULE | Refills: 1 | Status: SHIPPED | OUTPATIENT
Start: 2019-06-19 | End: 2020-11-02

## 2019-06-19 RX ORDER — BUPROPION HYDROCHLORIDE 150 MG/1
150 TABLET ORAL EVERY MORNING
Qty: 30 TABLET | Refills: 1 | Status: SHIPPED | OUTPATIENT
Start: 2019-06-19 | End: 2020-02-17

## 2019-06-19 RX ORDER — BUSPIRONE HYDROCHLORIDE 5 MG/1
5 TABLET ORAL 3 TIMES DAILY
Qty: 90 TABLET | Refills: 1 | Status: SHIPPED | OUTPATIENT
Start: 2019-06-19 | End: 2020-02-17

## 2019-06-21 NOTE — PROGRESS NOTES
Subjective   Maria E Red is a 38 y.o. female who presents to the office for follow-up of GeneSight testing.  Tells me that her previous behavioral health meds have been Lexapro, Celexa and Trazodone.  Admits that she does have episodes of bursting out at her family so she is eager to go over results.    History of Present Illness     The following portions of the patient's history were reviewed and updated as appropriate: allergies, current medications, past family history, past medical history, past social history, past surgical history and problem list.    Review of Systems   Constitutional: Negative for chills, fatigue and fever.   HENT: Negative for congestion, sneezing, sore throat and trouble swallowing.    Eyes: Negative for visual disturbance.   Respiratory: Negative for cough, chest tightness, shortness of breath and wheezing.    Cardiovascular: Negative for chest pain, palpitations and leg swelling.   Gastrointestinal: Negative for abdominal pain, constipation, diarrhea, nausea and vomiting.   Genitourinary: Negative for dysuria, frequency and urgency.   Musculoskeletal: Negative for neck pain.   Skin: Negative for rash.   Neurological: Negative for dizziness, weakness and headaches.   Psychiatric/Behavioral: Positive for dysphoric mood. The patient is nervous/anxious.         In the past two weeks the pt has not felt down, depressed, hopeless or lost interest in doing things   All other systems reviewed and are negative.      Objective   Physical Exam   Constitutional: She is oriented to person, place, and time. She appears well-developed and well-nourished. She is cooperative.  Non-toxic appearance. No distress.   HENT:   Head: Normocephalic and atraumatic.   Right Ear: External ear normal.   Left Ear: External ear normal.   Nose: Nose normal.   Mouth/Throat: Oropharynx is clear and moist.   Eyes: Conjunctivae and EOM are normal. Pupils are equal, round, and reactive to light. Right eye  exhibits no discharge. Left eye exhibits no discharge. No scleral icterus.   Neck: Normal range of motion. Neck supple. No thyromegaly present.   Cardiovascular: Normal rate, regular rhythm, normal heart sounds and intact distal pulses. Exam reveals no gallop and no friction rub.   No murmur heard.  Pulmonary/Chest: Effort normal and breath sounds normal. No respiratory distress. She has no wheezes. She has no rales.   Abdominal: Soft. Bowel sounds are normal. She exhibits no distension. There is no tenderness. There is no rebound and no guarding.   Musculoskeletal: Normal range of motion. She exhibits no edema.   Lymphadenopathy:     She has no cervical adenopathy.   Neurological: She is alert and oriented to person, place, and time. No cranial nerve deficit. GCS eye subscore is 4. GCS verbal subscore is 5. GCS motor subscore is 6.   Skin: Skin is warm and dry. No rash noted.   Psychiatric: She has a normal mood and affect. Her speech is normal and behavior is normal. Judgment and thought content normal. Her mood appears not anxious. Cognition and memory are normal. She does not exhibit a depressed mood. She expresses no homicidal and no suicidal ideation. She expresses no suicidal plans and no homicidal plans.   Dressed appropriately for weather and situation, makes eye contact and engages in conversation; no outward signs of anxiety or depression   Nursing note and vitals reviewed.      Assessment/Plan   Maria E was seen today for anxiety and mood disorder.    Diagnoses and all orders for this visit:    Vitamin D deficiency    Episode of recurrent major depressive disorder, unspecified depression episode severity (CMS/HCC)    Anxiety    Other orders  -     busPIRone (BUSPAR) 5 MG tablet; Take 1 tablet by mouth 3 (Three) Times a Day.  -     buPROPion XL (WELLBUTRIN XL) 150 MG 24 hr tablet; Take 1 tablet by mouth Every Morning.  -     vitamin D (ERGOCALCIFEROL) 85309 units capsule capsule; Take one capsule by mouth  every Monday and Thursday    Medication changes reviewed with patient.  Original Voze[employer] testing results given to patient.

## 2019-06-24 NOTE — PROGRESS NOTES
Subjective   Maria E Red is a 38 y.o. female.  Presents today to establish care which will include management of GERD, seasonal allergies, anxiety and depression.  Has tried several medications including Lexapro, Celexa and Trazodone with no significant relief.  Took HCTZ for one year after birth for pre-eclampsia.  Works at [employer] on 3rd shift.  Has two children.  Mother had colon polyps.      History of Present Illness     The following portions of the patient's history were reviewed and updated as appropriate: allergies, current medications, past family history, past medical history, past social history, past surgical history and problem list.    Review of Systems   Constitutional: Negative for chills, fatigue and fever.   HENT: Negative for congestion, sneezing, sore throat and trouble swallowing.    Eyes: Negative for visual disturbance.   Respiratory: Negative for cough, chest tightness, shortness of breath and wheezing.    Cardiovascular: Negative for chest pain, palpitations and leg swelling.   Gastrointestinal: Negative for abdominal pain, constipation, diarrhea, nausea and vomiting.   Genitourinary: Negative for dysuria, frequency and urgency.   Musculoskeletal: Negative for neck pain.   Skin: Negative for rash.   Neurological: Negative for dizziness, weakness and headaches.   Psychiatric/Behavioral: Positive for dysphoric mood. The patient is nervous/anxious.         In the past two weeks the pt has not felt down, depressed, hopeless or lost interest in doing things   All other systems reviewed and are negative.      Objective   Physical Exam   Constitutional: She is oriented to person, place, and time. She appears well-developed and well-nourished. She is cooperative.  Non-toxic appearance. No distress.   HENT:   Head: Normocephalic and atraumatic.   Right Ear: External ear normal.   Left Ear: External ear normal.   Nose: Nose normal.   Mouth/Throat: Uvula is midline, oropharynx is clear and  moist and mucous membranes are normal.   Eyes: Conjunctivae and EOM are normal. Pupils are equal, round, and reactive to light. Right eye exhibits no discharge. Left eye exhibits no discharge. No scleral icterus.   Neck: Normal range of motion. Neck supple. No thyromegaly present.   Cardiovascular: Normal rate, regular rhythm, normal heart sounds and intact distal pulses. Exam reveals no gallop and no friction rub.   No murmur heard.  Pulmonary/Chest: Effort normal and breath sounds normal. No respiratory distress. She has no wheezes. She has no rales.   Abdominal: Soft. Normal appearance and bowel sounds are normal. She exhibits no distension. There is no tenderness. There is no rebound and no guarding.   Musculoskeletal: Normal range of motion. She exhibits no edema.   Lymphadenopathy:     She has no cervical adenopathy.   Neurological: She is alert and oriented to person, place, and time. No cranial nerve deficit. GCS eye subscore is 4. GCS verbal subscore is 5. GCS motor subscore is 6.   Skin: Skin is warm and dry. No rash noted.   Psychiatric: She has a normal mood and affect. Her speech is normal and behavior is normal. Judgment and thought content normal. Her mood appears not anxious. She does not exhibit a depressed mood. She expresses no homicidal and no suicidal ideation. She expresses no suicidal plans and no homicidal plans.   Dressed appropriately for weather and situation, makes eye contact and engages in conversation; no outward signs of anxiety or depression   Nursing note and vitals reviewed.      Assessment/Plan   Maria E was seen today for establish care.    Diagnoses and all orders for this visit:    Gastroesophageal reflux disease, esophagitis presence not specified  -     omeprazole (priLOSEC) 20 MG capsule; Take 1 capsule by mouth Every Night.    Mood disorder (CMS/[facility])  -     Ambulatory Referral to Behavioral Health    Anxiety  -     Ambulatory Referral to Behavioral Health    Low serum  vitamin B12  -     Vitamin B12; Future    Vitamin D deficiency  -     Vitamin D 25 Hydroxy; Future    General medical exam  -     CBC & Differential; Future  -     Comprehensive Metabolic Panel; Future  -     Hemoglobin A1c; Future  -     Lipid Panel; Future  -     T4, Free; Future  -     TSH; Future  -     Urinalysis With Culture If Indicated - Urine, Clean Catch; Future    Breast cancer screening  -     Mammo Screening Digital Tomosynthesis Bilateral With CAD    Encouraged heart healthy diet, cardio at least three times weekly.  Advised that GeneSight testing is the most effective way for us to find the best behavioral health medication to relieve her symptoms.

## 2020-11-02 ENCOUNTER — LAB (OUTPATIENT)
Dept: LAB | Facility: OTHER | Age: 40
End: 2020-11-02

## 2020-11-02 ENCOUNTER — OFFICE VISIT (OUTPATIENT)
Dept: FAMILY MEDICINE CLINIC | Facility: CLINIC | Age: 40
End: 2020-11-02

## 2020-11-02 VITALS
HEIGHT: 67 IN | DIASTOLIC BLOOD PRESSURE: 86 MMHG | HEART RATE: 72 BPM | SYSTOLIC BLOOD PRESSURE: 148 MMHG | BODY MASS INDEX: 44.73 KG/M2 | OXYGEN SATURATION: 98 % | WEIGHT: 285 LBS

## 2020-11-02 DIAGNOSIS — F41.1 GENERALIZED ANXIETY DISORDER: ICD-10-CM

## 2020-11-02 DIAGNOSIS — E04.9 GOITER: ICD-10-CM

## 2020-11-02 DIAGNOSIS — E55.9 VITAMIN D DEFICIENCY: ICD-10-CM

## 2020-11-02 DIAGNOSIS — E53.8 LOW SERUM VITAMIN B12: ICD-10-CM

## 2020-11-02 DIAGNOSIS — Z00.00 GENERAL MEDICAL EXAM: ICD-10-CM

## 2020-11-02 DIAGNOSIS — E55.9 VITAMIN D DEFICIENCY: Primary | ICD-10-CM

## 2020-11-02 LAB
ALBUMIN SERPL-MCNC: 4.1 G/DL (ref 3.5–5)
ALBUMIN/GLOB SERPL: 1.4 G/DL (ref 1.1–1.8)
ALP SERPL-CCNC: 86 U/L (ref 38–126)
ALT SERPL W P-5'-P-CCNC: 49 U/L
ANION GAP SERPL CALCULATED.3IONS-SCNC: 8 MMOL/L (ref 5–15)
AST SERPL-CCNC: 36 U/L (ref 14–36)
BASOPHILS # BLD AUTO: 0.03 10*3/MM3 (ref 0–0.2)
BASOPHILS NFR BLD AUTO: 0.4 % (ref 0–1.5)
BILIRUB SERPL-MCNC: 0.6 MG/DL (ref 0.2–1.3)
BILIRUB UR QL STRIP: NEGATIVE
BUN SERPL-MCNC: 10 MG/DL (ref 7–23)
BUN/CREAT SERPL: 16.4 (ref 7–25)
CALCIUM SPEC-SCNC: 8.6 MG/DL (ref 8.4–10.2)
CHLORIDE SERPL-SCNC: 104 MMOL/L (ref 101–112)
CHOLEST SERPL-MCNC: 151 MG/DL (ref 150–200)
CLARITY UR: CLEAR
CO2 SERPL-SCNC: 25 MMOL/L (ref 22–30)
COLOR UR: YELLOW
CREAT SERPL-MCNC: 0.61 MG/DL (ref 0.52–1.04)
DEPRECATED RDW RBC AUTO: 43.6 FL (ref 37–54)
EOSINOPHIL # BLD AUTO: 0.09 10*3/MM3 (ref 0–0.4)
EOSINOPHIL NFR BLD AUTO: 1.1 % (ref 0.3–6.2)
ERYTHROCYTE [DISTWIDTH] IN BLOOD BY AUTOMATED COUNT: 13.1 % (ref 12.3–15.4)
GFR SERPL CREATININE-BSD FRML MDRD: 109 ML/MIN/1.73 (ref 64–149)
GLOBULIN UR ELPH-MCNC: 3 GM/DL (ref 2.3–3.5)
GLUCOSE SERPL-MCNC: 101 MG/DL (ref 70–99)
GLUCOSE UR STRIP-MCNC: NEGATIVE MG/DL
HCT VFR BLD AUTO: 38.4 % (ref 34–46.6)
HDLC SERPL-MCNC: 34 MG/DL (ref 40–59)
HGB BLD-MCNC: 12.7 G/DL (ref 12–15.9)
HGB UR QL STRIP.AUTO: NEGATIVE
KETONES UR QL STRIP: NEGATIVE
LDLC SERPL CALC-MCNC: 103 MG/DL
LDLC/HDLC SERPL: 3.02 {RATIO} (ref 0–3.22)
LEUKOCYTE ESTERASE UR QL STRIP.AUTO: NEGATIVE
LYMPHOCYTES # BLD AUTO: 3.48 10*3/MM3 (ref 0.7–3.1)
LYMPHOCYTES NFR BLD AUTO: 40.7 % (ref 19.6–45.3)
MCH RBC QN AUTO: 30.8 PG (ref 26.6–33)
MCHC RBC AUTO-ENTMCNC: 33.1 G/DL (ref 31.5–35.7)
MCV RBC AUTO: 93.2 FL (ref 79–97)
MONOCYTES # BLD AUTO: 0.52 10*3/MM3 (ref 0.1–0.9)
MONOCYTES NFR BLD AUTO: 6.1 % (ref 5–12)
NEUTROPHILS NFR BLD AUTO: 4.42 10*3/MM3 (ref 1.7–7)
NEUTROPHILS NFR BLD AUTO: 51.7 % (ref 42.7–76)
NITRITE UR QL STRIP: NEGATIVE
PH UR STRIP.AUTO: 7 [PH] (ref 5.5–8)
PLATELET # BLD AUTO: 243 10*3/MM3 (ref 140–450)
PMV BLD AUTO: 9.4 FL (ref 6–12)
POTASSIUM SERPL-SCNC: 3.9 MMOL/L (ref 3.4–5)
PROT SERPL-MCNC: 7.1 G/DL (ref 6.3–8.6)
PROT UR QL STRIP: NEGATIVE
RBC # BLD AUTO: 4.12 10*6/MM3 (ref 3.77–5.28)
SODIUM SERPL-SCNC: 137 MMOL/L (ref 137–145)
SP GR UR STRIP: 1.02 (ref 1–1.03)
TRIGL SERPL-MCNC: 72 MG/DL
UROBILINOGEN UR QL STRIP: NORMAL
VLDLC SERPL-MCNC: 14 MG/DL (ref 5–40)
WBC # BLD AUTO: 8.54 10*3/MM3 (ref 3.4–10.8)

## 2020-11-02 PROCEDURE — 82306 VITAMIN D 25 HYDROXY: CPT | Performed by: NURSE PRACTITIONER

## 2020-11-02 PROCEDURE — 80053 COMPREHEN METABOLIC PANEL: CPT | Performed by: NURSE PRACTITIONER

## 2020-11-02 PROCEDURE — 36415 COLL VENOUS BLD VENIPUNCTURE: CPT | Performed by: NURSE PRACTITIONER

## 2020-11-02 PROCEDURE — 85025 COMPLETE CBC W/AUTO DIFF WBC: CPT | Performed by: NURSE PRACTITIONER

## 2020-11-02 PROCEDURE — 84439 ASSAY OF FREE THYROXINE: CPT | Performed by: NURSE PRACTITIONER

## 2020-11-02 PROCEDURE — 82607 VITAMIN B-12: CPT | Performed by: NURSE PRACTITIONER

## 2020-11-02 PROCEDURE — 84443 ASSAY THYROID STIM HORMONE: CPT | Performed by: NURSE PRACTITIONER

## 2020-11-02 PROCEDURE — 99213 OFFICE O/P EST LOW 20 MIN: CPT | Performed by: NURSE PRACTITIONER

## 2020-11-02 PROCEDURE — 80061 LIPID PANEL: CPT | Performed by: NURSE PRACTITIONER

## 2020-11-02 PROCEDURE — 81003 URINALYSIS AUTO W/O SCOPE: CPT | Performed by: NURSE PRACTITIONER

## 2020-11-02 PROCEDURE — 83036 HEMOGLOBIN GLYCOSYLATED A1C: CPT | Performed by: NURSE PRACTITIONER

## 2020-11-02 RX ORDER — DESVENLAFAXINE SUCCINATE 50 MG/1
50 TABLET, EXTENDED RELEASE ORAL DAILY
Qty: 30 TABLET | Refills: 2 | Status: SHIPPED | OUTPATIENT
Start: 2020-11-02 | End: 2021-02-17 | Stop reason: SDUPTHER

## 2020-11-03 LAB
25(OH)D3 SERPL-MCNC: 26.2 NG/ML (ref 30–100)
HBA1C MFR BLD: 5.5 % (ref 4.8–5.6)
T4 FREE SERPL-MCNC: 1.25 NG/DL (ref 0.93–1.7)
TSH SERPL DL<=0.05 MIU/L-ACNC: 3.39 UIU/ML (ref 0.27–4.2)
VIT B12 BLD-MCNC: 388 PG/ML (ref 211–946)

## 2020-11-23 PROBLEM — E04.9 GOITER: Status: ACTIVE | Noted: 2020-11-23

## 2020-11-23 PROBLEM — F41.1 GENERALIZED ANXIETY DISORDER: Status: ACTIVE | Noted: 2020-11-23

## 2020-11-23 NOTE — PROGRESS NOTES
Subjective   Maria E Red is a 40 y.o. female who presents to the office for anxiety follow-up which she says she's had for several years, worsening last couple of months.  Has taken meds before, Wellbutrin which caused insomnia and Buspar which amplified her anxiety.    Also has a gouter that she would like updated imaging on.  History of Present Illness     The following portions of the patient's history were reviewed and updated as appropriate: allergies, current medications, past family history, past medical history, past social history, past surgical history and problem list.    Review of Systems   Constitutional: Negative for chills, fatigue and fever.   HENT: Negative for congestion, sneezing, sore throat and trouble swallowing.    Eyes: Negative for visual disturbance.   Respiratory: Negative for cough, chest tightness, shortness of breath and wheezing.    Cardiovascular: Negative for chest pain, palpitations and leg swelling.   Gastrointestinal: Negative for abdominal pain, constipation, diarrhea, nausea and vomiting.   Genitourinary: Negative for dysuria, frequency and urgency.   Musculoskeletal: Negative for neck pain.   Skin: Negative for rash.   Neurological: Negative for dizziness, weakness and headaches.   Psychiatric/Behavioral: The patient is nervous/anxious.         In the past two weeks the pt has not felt down, depressed, hopeless or lost interest in doing things   All other systems reviewed and are negative.      Objective   Physical Exam  Vitals signs and nursing note reviewed.   Constitutional:       General: She is not in acute distress.     Appearance: She is well-developed. She is morbidly obese. She is not ill-appearing, toxic-appearing or diaphoretic.   HENT:      Head: Normocephalic and atraumatic.      Right Ear: External ear normal.      Left Ear: External ear normal.      Nose: Nose normal.   Eyes:      General: No scleral icterus.        Right eye: No discharge.          Left eye: No discharge.      Conjunctiva/sclera: Conjunctivae normal.      Pupils: Pupils are equal, round, and reactive to light.   Neck:      Musculoskeletal: Full passive range of motion without pain, normal range of motion and neck supple.      Thyroid: No thyromegaly.      Trachea: Trachea and phonation normal.   Cardiovascular:      Rate and Rhythm: Normal rate and regular rhythm.      Heart sounds: Normal heart sounds. No murmur. No friction rub. No gallop.    Pulmonary:      Effort: Pulmonary effort is normal. No respiratory distress.      Breath sounds: Normal breath sounds. No wheezing or rales.   Abdominal:      General: Bowel sounds are normal. There is no distension.      Palpations: Abdomen is soft.      Tenderness: There is no abdominal tenderness. There is no guarding or rebound.   Musculoskeletal: Normal range of motion.   Lymphadenopathy:      Cervical: No cervical adenopathy.   Skin:     General: Skin is warm and dry.      Capillary Refill: Capillary refill takes less than 2 seconds.      Findings: No rash.   Neurological:      General: No focal deficit present.      Mental Status: She is alert and oriented to person, place, and time.      GCS: GCS eye subscore is 4. GCS verbal subscore is 5. GCS motor subscore is 6.      Cranial Nerves: No cranial nerve deficit.   Psychiatric:         Attention and Perception: Attention and perception normal.         Mood and Affect: Mood and affect normal.         Speech: Speech normal.         Behavior: Behavior normal. Behavior is cooperative.         Thought Content: Thought content normal. Thought content does not include homicidal or suicidal ideation. Thought content does not include homicidal or suicidal plan.         Cognition and Memory: Cognition and memory normal.         Judgment: Judgment normal.      Comments: Dressed appropriately for weather and situation, makes eye contact and engages in conversation         Assessment/Plan   Diagnoses and all  orders for this visit:    1. Vitamin D deficiency (Primary)  -     Vitamin D 25 Hydroxy; Future    2. Low serum vitamin B12  -     Vitamin B12; Future    3. Goiter  -     Hemoglobin A1c; Future  -     US Thyroid  -     Ambulatory Referral to Endocrinology    4. General medical exam  -     CBC & Differential; Future  -     Comprehensive Metabolic Panel; Future  -     T4, Free; Future  -     TSH; Future  -     Lipid Panel; Future  -     Urinalysis With Culture If Indicated -; Future    5. Generalized anxiety disorder    Other orders  -     desvenlafaxine (Pristiq) 50 MG 24 hr tablet; Take 1 tablet by mouth Daily.  Dispense: 30 tablet; Refill: 2    Will start her on Pristiq, she will call my office even before her followup for any adverse side effects.    Will update fasting labs as well as VB12 and D.  Send for thyroid ultrasound.  Send off to [employer].             This document has been electronically signed by KIERRA Oneal on November 23, 2020 06:38 CST

## 2021-02-02 ENCOUNTER — LAB (OUTPATIENT)
Dept: LAB | Facility: HOSPITAL | Age: 41
End: 2021-02-02

## 2021-02-02 ENCOUNTER — OFFICE VISIT (OUTPATIENT)
Dept: ENDOCRINOLOGY | Facility: CLINIC | Age: 41
End: 2021-02-02

## 2021-02-02 VITALS
HEART RATE: 69 BPM | OXYGEN SATURATION: 99 % | DIASTOLIC BLOOD PRESSURE: 82 MMHG | SYSTOLIC BLOOD PRESSURE: 122 MMHG | HEIGHT: 67 IN | WEIGHT: 288.2 LBS | BODY MASS INDEX: 45.24 KG/M2

## 2021-02-02 DIAGNOSIS — E66.01 CLASS 3 SEVERE OBESITY WITHOUT SERIOUS COMORBIDITY WITH BODY MASS INDEX (BMI) OF 45.0 TO 49.9 IN ADULT, UNSPECIFIED OBESITY TYPE (HCC): ICD-10-CM

## 2021-02-02 DIAGNOSIS — G47.33 OBSTRUCTIVE SLEEP APNEA SYNDROME: ICD-10-CM

## 2021-02-02 DIAGNOSIS — E27.0 ADRENAL CORTEX HYPERFUNCTION (HCC): ICD-10-CM

## 2021-02-02 DIAGNOSIS — E04.9 GOITER: ICD-10-CM

## 2021-02-02 DIAGNOSIS — E28.2 PCOS (POLYCYSTIC OVARIAN SYNDROME): Primary | ICD-10-CM

## 2021-02-02 DIAGNOSIS — E11.9 WELL CONTROLLED TYPE 2 DIABETES MELLITUS (HCC): ICD-10-CM

## 2021-02-02 PROBLEM — Z00.00 GENERAL MEDICAL EXAM: Status: RESOLVED | Noted: 2019-06-05 | Resolved: 2021-02-02

## 2021-02-02 LAB
CORTIS AM PEAK SERPL-MCNC: 10.24 MCG/DL
ESTRADIOL SERPL HS-MCNC: 38.7 PG/ML
FSH SERPL-ACNC: 7.28 MIU/ML
GLUCOSE P FAST SERPL-MCNC: 98 MG/DL (ref 74–106)
HCG SERPL QL: NEGATIVE
LH SERPL-ACNC: 7.21 MIU/ML
PROGEST SERPL-MCNC: <0.05 NG/ML
PROLACTIN SERPL-MCNC: 8.34 NG/ML (ref 4.79–23.3)

## 2021-02-02 PROCEDURE — 82627 DEHYDROEPIANDROSTERONE: CPT | Performed by: INTERNAL MEDICINE

## 2021-02-02 PROCEDURE — 84146 ASSAY OF PROLACTIN: CPT | Performed by: INTERNAL MEDICINE

## 2021-02-02 PROCEDURE — 82670 ASSAY OF TOTAL ESTRADIOL: CPT | Performed by: INTERNAL MEDICINE

## 2021-02-02 PROCEDURE — 83498 ASY HYDROXYPROGESTERONE 17-D: CPT | Performed by: INTERNAL MEDICINE

## 2021-02-02 PROCEDURE — 84403 ASSAY OF TOTAL TESTOSTERONE: CPT | Performed by: INTERNAL MEDICINE

## 2021-02-02 PROCEDURE — 83002 ASSAY OF GONADOTROPIN (LH): CPT | Performed by: INTERNAL MEDICINE

## 2021-02-02 PROCEDURE — 83001 ASSAY OF GONADOTROPIN (FSH): CPT | Performed by: INTERNAL MEDICINE

## 2021-02-02 PROCEDURE — 82024 ASSAY OF ACTH: CPT | Performed by: INTERNAL MEDICINE

## 2021-02-02 PROCEDURE — 99244 OFF/OP CNSLTJ NEW/EST MOD 40: CPT | Performed by: INTERNAL MEDICINE

## 2021-02-02 PROCEDURE — 86376 MICROSOMAL ANTIBODY EACH: CPT | Performed by: INTERNAL MEDICINE

## 2021-02-02 PROCEDURE — 82533 TOTAL CORTISOL: CPT | Performed by: INTERNAL MEDICINE

## 2021-02-02 PROCEDURE — 82947 ASSAY GLUCOSE BLOOD QUANT: CPT | Performed by: INTERNAL MEDICINE

## 2021-02-02 PROCEDURE — 36415 COLL VENOUS BLD VENIPUNCTURE: CPT | Performed by: INTERNAL MEDICINE

## 2021-02-02 PROCEDURE — 84144 ASSAY OF PROGESTERONE: CPT | Performed by: INTERNAL MEDICINE

## 2021-02-02 PROCEDURE — 83525 ASSAY OF INSULIN: CPT | Performed by: INTERNAL MEDICINE

## 2021-02-02 PROCEDURE — 84703 CHORIONIC GONADOTROPIN ASSAY: CPT | Performed by: INTERNAL MEDICINE

## 2021-02-02 RX ORDER — DEXAMETHASONE 1 MG
1 TABLET ORAL ONCE
Qty: 1 TABLET | Refills: 0 | Status: SHIPPED | OUTPATIENT
Start: 2021-02-02 | End: 2021-02-02

## 2021-02-02 NOTE — PROGRESS NOTES
"Chief Complaint  Establish Care (goiter)    Subjective          Maria E Red presents to [facility] for   History of Present Illness  Very pleasant 40-year-old female who works as respiratory therapist for [employer] comes for consultation    Perceived goiter    Thyroid ultrasound done on January 2021 demonstrates right lobe mildly enlarged at 5.1 cm with normal echotexture and absence of nodules.  Left lobe is 4.8 cm.    TSH is normal    PCOS    Patient was diagnosed when she was in her teen years, complains of hirsutism affecting chin, upper lip,  lower abdomen,  inner thighs for which she uses mechanical means for removal    Refers her last menstrual period to be in July 2020    Used to be on oral contraceptives but worsened her mood    Reproductive history is 1 live birth    Objective   Vital Signs:   /82   Pulse 69   Ht 168.9 cm (66.5\")   Wt 131 kg (288 lb 3.2 oz)   SpO2 99%   BMI 45.82 kg/m²     Physical Exam   Patient is alert oriented and very pleasant    Normocephalic    Neck no palpable goiter, no thyroid nodules    Cardiovascular normal rhythm and rate    Normal respiratory effort normal breath sounds    Abdomen with thin striae    Skin, hirsutism appreciated in chin and upper lip recently shaven    No cushingoid features    Result Review :   The following data was reviewed by: Clinton Montes De Oca MD on 02/02/2021:  Common labs    Common Labsle 11/2/20 11/2/20 11/2/20 11/2/20    1143 1143 1143 1143   BUN  10     Creatinine  0.61     eGFR Non African Am  109     Sodium  137     Potassium  3.9     Chloride  104     Calcium  8.6     Albumin  4.10     Total Bilirubin  0.6     Alkaline Phosphatase  86     AST (SGOT)  36     ALT (SGPT)  49 (A)     WBC 8.54      Hemoglobin 12.7      Hematocrit 38.4      Platelets 243      Triglycerides   72    HDL Cholesterol   34 (A)    LDL Cholesterol    103 (A)    Hemoglobin A1C    5.50   (A) Abnormal value  "               Thyroid Workup    Lab Results   Component Value Date    TSH 3.390 11/02/2020    TSH 2.750 06/10/2019       Lab Results   Component Value Date    FREET4 1.25 11/02/2020    FREET4 1.14 06/10/2019       No results found for: T3FREE    TPO antibodies    Lab Results   Component Value Date    THYROIDAB <9 02/02/2021       TSI antibodies    No results found for: THYRSTIMIMMU    --------------------------    Lab Results   Component Value Date    GKBESWVL44 388 11/02/2020       ---------------------------    Lab Results   Component Value Date    YNVV96KU 26.2 (L) 11/02/2020    LVHI81OL 19.2 (L) 06/10/2019                            Assessment and Plan    Problem List Items Addressed This Visit        Other    Goiter    Relevant Orders    Testosterone, Total, Women & Children    17-Hydroxyprogesterone    Antimullerian Hormone (AMH)    DHEA-Sulfate (Completed)    Progesterone (Completed)    Estradiol (Completed)    FSH & LH (Completed)    hCG, Serum, Qualitative (Completed)    Thyroid Peroxidase Antibody (Completed)    Prolactin (Completed)    ACTH (Completed)    Cortisol - AM (Completed)    Dexamethasone, Serum    Cortisol - AM (Completed)    ACTH      Other Visit Diagnoses     PCOS (polycystic ovarian syndrome)    -  Primary    Relevant Orders    Testosterone, Total, Women & Children    17-Hydroxyprogesterone    Antimullerian Hormone (AMH)    DHEA-Sulfate (Completed)    Progesterone (Completed)    Estradiol (Completed)    FSH & LH (Completed)    hCG, Serum, Qualitative (Completed)    Thyroid Peroxidase Antibody (Completed)    Prolactin (Completed)    ACTH (Completed)    Cortisol - AM (Completed)    Dexamethasone, Serum    Cortisol - AM (Completed)    ACTH    Adrenal cortex hyperfunction (CMS/HCC)        Relevant Orders    Testosterone, Total, Women & Children    17-Hydroxyprogesterone    Antimullerian Hormone (AMH)    DHEA-Sulfate (Completed)    Progesterone (Completed)    Estradiol (Completed)    FSH & LH  (Completed)    hCG, Serum, Qualitative (Completed)    Thyroid Peroxidase Antibody (Completed)    Prolactin (Completed)    ACTH (Completed)    Cortisol - AM (Completed)    Dexamethasone, Serum    Cortisol - AM (Completed)    ACTH    Class 3 severe obesity without serious comorbidity with body mass index (BMI) of 45.0 to 49.9 in adult, unspecified obesity type (CMS/[facility])        Relevant Orders    Ambulatory Referral to Bariatric Surgery    Obstructive sleep apnea syndrome        Relevant Orders    Polysomnography 4 or More Parameters    Ambulatory Referral to Sleep Medicine    Well controlled type 2 diabetes mellitus (CMS/[facility])        Relevant Orders    Insulin, Total (Completed)    Glucose, Fasting (Completed)      Perceived goiter    Mainly reassurance, slight deviation from upper limit of normal of right lobe but otherwise thyroid ultrasound completely normal and normal TSH.    TPO ab negative     =======    PCOS    Hyperinsulinemia - Type 2 Diabetes    Start metformin 500 mg w supper  Ozempic 0.25 mg w x 4 w then 0.5 mg w       She has side effects to oral contraceptives so we can use intermittent progesterone. I would be hesitant to use Aldactone in the absence of a contraceptive method    =======      Obesity    Needs referral to bariatric surgery      =======    Obstructive sleep apnea    Referred to sleep medicine      =  Follow Up   No follow-ups on file.  Patient was given instructions and counseling regarding her condition or for health maintenance advice. Please see specific information pulled into the AVS if appropriate.

## 2021-02-03 LAB
DHEA-S SERPL-MCNC: 67.8 UG/DL (ref 57.3–279.2)
INSULIN SERPL-ACNC: 32.6 UIU/ML (ref 2.6–24.9)
THYROPEROXIDASE AB SERPL-ACNC: <9 IU/ML (ref 0–34)

## 2021-02-04 LAB — ACTH PLAS-MCNC: 32.8 PG/ML (ref 7.2–63.3)

## 2021-02-05 ENCOUNTER — LAB (OUTPATIENT)
Dept: LAB | Facility: OTHER | Age: 41
End: 2021-02-05

## 2021-02-05 DIAGNOSIS — E28.2 PCOS (POLYCYSTIC OVARIAN SYNDROME): ICD-10-CM

## 2021-02-05 DIAGNOSIS — E27.0 ADRENAL CORTEX HYPERFUNCTION (HCC): ICD-10-CM

## 2021-02-05 DIAGNOSIS — E04.9 GOITER: ICD-10-CM

## 2021-02-05 LAB — CORTIS AM PEAK SERPL-MCNC: 0.35 MCG/DL

## 2021-02-05 PROCEDURE — 82533 TOTAL CORTISOL: CPT | Performed by: INTERNAL MEDICINE

## 2021-02-05 PROCEDURE — 80299 QUANTITATIVE ASSAY DRUG: CPT | Performed by: INTERNAL MEDICINE

## 2021-02-05 PROCEDURE — 36415 COLL VENOUS BLD VENIPUNCTURE: CPT | Performed by: INTERNAL MEDICINE

## 2021-02-05 PROCEDURE — 82024 ASSAY OF ACTH: CPT | Performed by: INTERNAL MEDICINE

## 2021-02-05 PROCEDURE — 82397 CHEMILUMINESCENT ASSAY: CPT | Performed by: INTERNAL MEDICINE

## 2021-02-05 RX ORDER — METFORMIN HYDROCHLORIDE 500 MG/1
500 TABLET, EXTENDED RELEASE ORAL DAILY
Qty: 30 TABLET | Refills: 11 | Status: SHIPPED | OUTPATIENT
Start: 2021-02-05 | End: 2022-02-18 | Stop reason: SDUPTHER

## 2021-02-05 RX ORDER — SEMAGLUTIDE 1.34 MG/ML
0.5 INJECTION, SOLUTION SUBCUTANEOUS WEEKLY
Qty: 1.5 ML | Refills: 11 | Status: SHIPPED | OUTPATIENT
Start: 2021-02-05 | End: 2022-04-21

## 2021-02-07 LAB — 17OHP SERPL-MCNC: 36 NG/DL

## 2021-02-08 LAB
ACTH PLAS-MCNC: 2.9 PG/ML (ref 7.2–63.3)
TESTOST SERPL-MCNC: 47.6 NG/DL (ref 10–55)

## 2021-02-12 LAB — MIS SERPL-MCNC: 2.04 NG/ML

## 2021-02-15 LAB — DEXAMETHASONE SERPL-MCNC: 383 NG/DL

## 2021-02-17 RX ORDER — DESVENLAFAXINE SUCCINATE 50 MG/1
50 TABLET, EXTENDED RELEASE ORAL DAILY
Qty: 30 TABLET | Refills: 2 | Status: SHIPPED | OUTPATIENT
Start: 2021-02-17 | End: 2021-05-04 | Stop reason: SDUPTHER

## 2021-02-24 ENCOUNTER — HOSPITAL ENCOUNTER (OUTPATIENT)
Dept: SLEEP MEDICINE | Facility: HOSPITAL | Age: 41
Discharge: HOME | End: 2021-02-24
Admitting: INTERNAL MEDICINE

## 2021-02-24 PROCEDURE — 95810 POLYSOM 6/> YRS 4/> PARAM: CPT | Performed by: PSYCHIATRY & NEUROLOGY

## 2021-02-24 PROCEDURE — 95810 POLYSOM 6/> YRS 4/> PARAM: CPT

## 2021-03-03 ENCOUNTER — OFFICE VISIT (OUTPATIENT)
Dept: SLEEP MEDICINE | Facility: HOSPITAL | Age: 41
End: 2021-03-03

## 2021-03-03 ENCOUNTER — TELEPHONE (OUTPATIENT)
Dept: BARIATRICS/WEIGHT MGMT | Facility: CLINIC | Age: 41
End: 2021-03-03

## 2021-03-03 VITALS
HEART RATE: 94 BPM | HEIGHT: 66 IN | BODY MASS INDEX: 46.28 KG/M2 | SYSTOLIC BLOOD PRESSURE: 134 MMHG | WEIGHT: 288 LBS | DIASTOLIC BLOOD PRESSURE: 87 MMHG | OXYGEN SATURATION: 98 %

## 2021-03-03 DIAGNOSIS — R06.83 SNORING: Primary | ICD-10-CM

## 2021-03-03 DIAGNOSIS — E66.01 MORBID (SEVERE) OBESITY DUE TO EXCESS CALORIES (HCC): ICD-10-CM

## 2021-03-03 PROCEDURE — 99214 OFFICE O/P EST MOD 30 MIN: CPT | Performed by: NURSE PRACTITIONER

## 2021-03-03 RX ORDER — LANOLIN ALCOHOL/MO/W.PET/CERES
1.5 CREAM (GRAM) TOPICAL
COMMUNITY

## 2021-03-03 RX ORDER — DEXAMETHASONE 1 MG
TABLET ORAL
COMMUNITY
Start: 2021-02-02 | End: 2021-09-13

## 2021-03-03 NOTE — PROGRESS NOTES
New Patient Sleep Medicine Consultation    Encounter Date: 3/3/2021         Patient's Primary Care Provider: Rachel Pierre APRN  Referring Provider: Clinton Gan *  Reason for consultation/chief complaint: snoring, loud disruptive snoring, excessive daytime sleepiness and unrefreshing sleep    Maria E Red is a 40 y.o. female who admits to snoring, unrestful sleep, decreased libido, working night shift or rotataing shifts, excessive daytime sleepiness, morning headaches, irritability, sleeping less than 6 hours per night, Disturbed or restless sleep, memory loss, sleepy driving, night sweats, teeth grinding, difficulty falling asleep and takes medicine to help go to sleep.     She denies cataplexy, sleep paralysis, or hypnagogic hallucinations. Her bedtime is ~ 2230 or 0900. She  falls asleep after 15-30 minutes, and is up 0-2 times per night. She wakes up ~ 0600 or 1530. She endorses 4-7 hours of sleep. She drinks 1-2 cups of coffee, 0-1 teas, and 1-2 sodas per day. She drinks 2-3 alcoholic beverages per week. She is not a current smoker. She does not take sedatives or hypnotics. She has rare sleepiness with driving. She naps every Friday.    Patient was referred straight to the sleep lap for a PSG, which was non-diagnostic for YVES/SDB.    Elliott - 10    Prior Sleep Testin. PSG on 2021, AHI of 3.8, snoring 49%, no sustained hypoxia or significant PLMD      Past Medical History:   Diagnosis Date   • Anxiety    • Anxiety and depression    • Arthritis    • Atypical squamous cells of undetermined significance (ASC-US) on cervical Pap smear    • Back pain     back spasms and leg spasms   • Backache    • Bronchitis    • Constipation    • Depression    • Dysplasia of cervix     stage 2   • Encounter for  visit    • Encounter for routine gynecological examination    • Epigastric pain    • Exanthematous disorder    • GERD (gastroesophageal reflux disease)    • HPV (human  papilloma virus) infection    • Hypertensive disorder     resolved (with pregnancy)   • IBS (irritable bowel syndrome)    • Low back pain    • Migraine    • Obesity    • Pap smear abnormality of cervix     grade 2 dysplagia   • PCOS (polycystic ovarian syndrome)    • Polycystic ovaries    • Psoriasis    • Psychogenic pruritus    • Surgery follow-up    • Wears glasses      Social History     Socioeconomic History   • Marital status:      Spouse name: Not on file   • Number of children: Not on file   • Years of education: Not on file   • Highest education level: Not on file   Tobacco Use   • Smoking status: Former Smoker     Quit date: 10/1/2016     Years since quittin.4   • Smokeless tobacco: Never Used   Substance and Sexual Activity   • Alcohol use: Yes     Comment: occational    • Drug use: No   • Sexual activity: Yes     Partners: Male     Family History   Problem Relation Age of Onset   • Hypertension Father    • Alcohol abuse Father    • Arthritis Father    • Depression Father    • Hyperlipidemia Father    • Arthritis Mother    • Depression Mother    • Early death Mother    • Depression Sister    • Drug abuse Brother    • Arthritis Maternal Grandmother    • COPD Maternal Grandmother    • Cancer Maternal Grandmother    • Depression Maternal Grandmother    • Diabetes Maternal Grandmother    • Heart disease Maternal Grandmother    • Hypertension Maternal Grandmother    • Thyroid disease Maternal Grandmother    • Lymphoma Maternal Grandmother    • Arthritis Maternal Grandfather    • Hypertension Maternal Grandfather    • Arthritis Paternal Grandmother    • Depression Paternal Grandmother    • Heart disease Paternal Grandmother    • Hypertension Paternal Grandmother    • Cancer Paternal Grandmother    • Breast cancer Paternal Grandmother    • Arthritis Paternal Grandfather    • Heart disease Paternal Grandfather    • Hypertension Paternal Grandfather    • Kidney disease Paternal Uncle    • Learning  "disabilities Neg Hx      Prior T&A, UPPP, maxillofacial, or bariatric surgery: None  Family history of sleep disorders: Aunts and uncles, grandparent - YVES on CPAP  Other family history + for: As above  Occupation: Respiratory therapist  Marital status:   Children: 2  Has 1 brothers and 1 sisters  Smoking history: smoked 1/2 ppds from age 15 until 31      Review of Systems:  Constitutional: positive for fatigue  Eyes: negative  Ears, nose, mouth, throat, and face: negative  Respiratory: negative  Cardiovascular: positive for fatigue and palpitations  Gastrointestinal: negative  Genitourinary:negative  Integument/breast: negative  Hematologic/lymphatic: negative  Musculoskeletal:negative  Neurological: positive for dizziness, headaches and memory problems  Behavioral/Psych: negative  Endocrine: negative  Allergic/Immunologic: negative   Patient advised to discuss any positive ROS with PCP.      Vitals:    03/03/21 1504   BP: 134/87   Pulse: 94   SpO2: 98%           03/03/21  1504   Weight: 131 kg (288 lb)       Body mass index is 45.79 kg/m². Patient's Body mass index is 45.79 kg/m². BMI is above normal parameters. Recommendations include: referral to primary care.      Physical Exam:        General: Alert. Cooperative. Well developed. No acute distress.   Head/Neck:  Normocephalic. Symmetrical. Atraumatic.     Neck circumference: 15.5\"             Eyes: Sclera clear. No icterus. PERRLA. Normal EOM.             Ears: No deformities. Normal hearing.             Nose: No septal deviation. No drainage.          Throat: No oral lesions. No thrush. Moist mucous membranes. Trachea midline    Tongue is normal with lingular ridges    Dentition is good       Pharynx: Posterior pharyngeal pillars are narrow    Mallampati score of III (soft and hard palate and base of uvula visible)    Pharynx is normal with unrermarkable tonsils   Chest Wall:  Normal shape. Symmetric expansion with respiration. No tenderness.          " Lungs:  Clear to auscultation bilaterally. No wheezes. No rhonchi. No rales. Respirations regular, even and unlabored.            Heart:  Regular rhythm and normal rate. Normal S1 and S2. No murmur.     Abdomen:  Soft, non-tender and non-distended. Normal bowel sounds. No masses.  Extremities:  Moves all extremities well. No edema.           Pulses: Pulses palpable and equal bilaterally.               Skin: Dry. Intact. No bleeding. No rash.           Neuro: Moves all 4 extremities and cranial nerves grossly intact.  Psychiatric: Normal mood and affect.      Current Outpatient Medications:   •  melatonin 3 MG tablet, Take 1.5 mg by mouth every night at bedtime., Disp: , Rfl:   •  cetirizine (zyrTEC) 10 MG tablet, Take 10 mg by mouth Every Night., Disp: , Rfl:   •  desvenlafaxine (Pristiq) 50 MG 24 hr tablet, Take 1 tablet by mouth Daily., Disp: 30 tablet, Rfl: 2  •  dexamethasone (DECADRON) 1 MG tablet, , Disp: , Rfl:   •  metFORMIN ER (Glucophage XR) 500 MG 24 hr tablet, 500 mg w supper, Disp: 30 tablet, Rfl: 11  •  Multiple Vitamins-Minerals (MULTIVITAMIN ADULT PO), Take 1 tablet by mouth Daily., Disp: , Rfl:   •  omeprazole (priLOSEC) 20 MG capsule, Take 1 capsule by mouth Every Night., Disp: 90 capsule, Rfl: 1  •  Semaglutide,0.25 or 0.5MG/DOS, (Ozempic, 0.25 or 0.5 MG/DOSE,) 2 MG/1.5ML solution pen-injector, Inject 0.5 mg under the skin into the appropriate area as directed 1 (One) Time Per Week. 0.5 mg weekly, Disp: 1 pen, Rfl: 11    WBC   Date Value Ref Range Status   11/02/2020 8.54 3.40 - 10.80 10*3/mm3 Final     RBC   Date Value Ref Range Status   11/02/2020 4.12 3.77 - 5.28 10*6/mm3 Final     Hemoglobin   Date Value Ref Range Status   11/02/2020 12.7 12.0 - 15.9 g/dL Final     Hematocrit   Date Value Ref Range Status   11/02/2020 38.4 34.0 - 46.6 % Final     MCV   Date Value Ref Range Status   11/02/2020 93.2 79.0 - 97.0 fL Final     MCH   Date Value Ref Range Status   11/02/2020 30.8 26.6 - 33.0 pg Final      MCHC   Date Value Ref Range Status   11/02/2020 33.1 31.5 - 35.7 g/dL Final     RDW   Date Value Ref Range Status   11/02/2020 13.1 12.3 - 15.4 % Final     RDW-SD   Date Value Ref Range Status   11/02/2020 43.6 37.0 - 54.0 fl Final     MPV   Date Value Ref Range Status   11/02/2020 9.4 6.0 - 12.0 fL Final     Platelets   Date Value Ref Range Status   11/02/2020 243 140 - 450 10*3/mm3 Final     Neutrophil %   Date Value Ref Range Status   11/02/2020 51.7 42.7 - 76.0 % Final     Lymphocyte %   Date Value Ref Range Status   11/02/2020 40.7 19.6 - 45.3 % Final     Monocyte %   Date Value Ref Range Status   11/02/2020 6.1 5.0 - 12.0 % Final     Eosinophil %   Date Value Ref Range Status   11/02/2020 1.1 0.3 - 6.2 % Final     Basophil %   Date Value Ref Range Status   11/02/2020 0.4 0.0 - 1.5 % Final     Immature Grans %   Date Value Ref Range Status   07/24/2018 0.1 0.0 - 0.5 % Final     Neutrophils, Absolute   Date Value Ref Range Status   11/02/2020 4.42 1.70 - 7.00 10*3/mm3 Final     Lymphocytes, Absolute   Date Value Ref Range Status   11/02/2020 3.48 (H) 0.70 - 3.10 10*3/mm3 Final     Monocytes, Absolute   Date Value Ref Range Status   11/02/2020 0.52 0.10 - 0.90 10*3/mm3 Final     Eosinophils, Absolute   Date Value Ref Range Status   11/02/2020 0.09 0.00 - 0.40 10*3/mm3 Final     Basophils, Absolute   Date Value Ref Range Status   11/02/2020 0.03 0.00 - 0.20 10*3/mm3 Final     Immature Grans, Absolute   Date Value Ref Range Status   07/24/2018 0.01 0.00 - 0.02 10*3/mm3 Final     Lab Results   Component Value Date    GLUCOSE 101 (H) 11/02/2020    BUN 10 11/02/2020    CREATININE 0.61 11/02/2020    EGFRIFNONA 109 11/02/2020    BCR 16.4 11/02/2020    K 3.9 11/02/2020    CO2 25.0 11/02/2020    CALCIUM 8.6 11/02/2020    ALBUMIN 4.10 11/02/2020    AST 36 11/02/2020    ALT 49 (H) 11/02/2020       ASSESSMENT:  1. Snoring - New (to me), no additional work-up planned (3)  1. No YVES on in lab PSG  2. Discussed optimal  control of allergy symptoms, weight loss, positional therapy, nasal valves, etc.  3. Follow up within 1 year, may need repeat study in the future  2. Morbid obesity - BMI 45.7 - stable chronic illness  1. Has upcoming appointment with Bariatrics       I spent 35 minutes caring for Maria E on this date of service. This time includes time spent by me in the following activities: preparing for the visit, reviewing tests, obtaining and/or reviewing a separately obtained history, performing a medically appropriate examination and/or evaluation , counseling and educating the patient/family/caregiver, referring and communicating with other health care professionals , documenting information in the medical record and independently interpreting results and communicating that information with the patient/family/caregiver; discussing Weight loss, Sleep hygiene and Further testing    RTC in 12 months. Patient agrees to return sooner if changes in symptoms.           This document has been electronically signed by KIRERA Gonzalez on March 3, 2021 15:45 CST          CC: Rachel Pierre APRN Chang Figueroa, Sergio *

## 2021-03-04 ENCOUNTER — OFFICE VISIT (OUTPATIENT)
Dept: BARIATRICS/WEIGHT MGMT | Facility: HOSPITAL | Age: 41
End: 2021-03-04

## 2021-03-04 ENCOUNTER — OFFICE VISIT (OUTPATIENT)
Dept: BARIATRICS/WEIGHT MGMT | Facility: CLINIC | Age: 41
End: 2021-03-04

## 2021-03-04 VITALS
BODY MASS INDEX: 44.73 KG/M2 | WEIGHT: 285 LBS | SYSTOLIC BLOOD PRESSURE: 131 MMHG | OXYGEN SATURATION: 98 % | HEIGHT: 67 IN | DIASTOLIC BLOOD PRESSURE: 83 MMHG | HEART RATE: 82 BPM | TEMPERATURE: 97.5 F

## 2021-03-04 DIAGNOSIS — E55.9 VITAMIN D DEFICIENCY: ICD-10-CM

## 2021-03-04 DIAGNOSIS — E66.01 CLASS 3 SEVERE OBESITY DUE TO EXCESS CALORIES WITH SERIOUS COMORBIDITY AND BODY MASS INDEX (BMI) OF 40.0 TO 44.9 IN ADULT (HCC): Primary | ICD-10-CM

## 2021-03-04 DIAGNOSIS — K21.9 GASTROESOPHAGEAL REFLUX DISEASE, UNSPECIFIED WHETHER ESOPHAGITIS PRESENT: ICD-10-CM

## 2021-03-04 DIAGNOSIS — F33.9 EPISODE OF RECURRENT MAJOR DEPRESSIVE DISORDER, UNSPECIFIED DEPRESSION EPISODE SEVERITY (HCC): ICD-10-CM

## 2021-03-04 PROCEDURE — 99213 OFFICE O/P EST LOW 20 MIN: CPT | Performed by: NURSE PRACTITIONER

## 2021-03-04 NOTE — PROGRESS NOTES
"Nutrition Services    Patient Name:  Maria E Red  YOB: 1980  MRN: [id number]  Admit Date:  (Not on file)    NUTRITION BARIATRIC/[facility] NOTE     Visit 1  Initial Assessment   BA#   [facility]    Anthropometrics   Height: 67 in  Weight: 285 lbs  BMI: 44.64    Nutrition Recall  Eating ___2___ meals daily   Snacking  Making healthier choices  Excessive sweet intake  Limited sweet intake  Drinking carbonated beverages  Drinking less than 64 fluid ounces: 2-3 bottles per day  Drinking greater to or equal to 64 fluid ounces    Education    Goal Setting and Information Packet  4 meal per day diet plan  4 meal per day sample menu\"  \"Perfect Protein, Fiber in Foods, and Reducing Fat\"  Reinforce Nutritional Needs for Surgery  Reinforce Nutritional Needs for [facility]    Nutrition Goals   Eat ___4__ meals per day with protein  Eat protein first at meals  Protein goal: 65gms   discussed protein guidelines for shakes and bar  Eliminate snacks  Healthier food choices  Portion control / Use smaller plate or measuring cup   Eliminate soda  Increase fluid intake to 64 ounces per day      Electronically signed by:  Nelli Bruce  03/04/21 16:54 CST   "

## 2021-03-04 NOTE — PROGRESS NOTES
Patient Care Team:  Rachel Pierre APRN as PCP - General (Family Medicine)  Marni Lang APRN as Obstetrician (Nurse Practitioner)  Awais Cowan DPM as Consulting Physician (Podiatry)      Subjective     Patient is a 40 y.o. female presents with morbid obesity and her Body mass index is 44.64 kg/m².     She is here for discussion of medical weight loss options.  She stated she has been with the disease of obesity for year(s).  She stated she suffers from back pain, HTN, IBS, fatigue, PCOS and morbid obesity due to her weight gain.  She stated that weight loss helps alleviate these symptoms.   She stated that she has tried other diet regimens to help with weight loss.  She stated that she has attempted these conservative methods for weight loss without maintaining long term success.  Today she would like to discuss medical weight loss options for now.  She states that she may decide to do surgical weight loss but would like to start with medical weight loss at this time.    Review of Systems   Constitutional: Negative.    Respiratory: Negative.    Cardiovascular: Negative.    Gastrointestinal: Negative.    Endocrine: Negative.    Musculoskeletal: Negative.    Psychiatric/Behavioral: Negative.         History  Past Medical History:   Diagnosis Date   • Anxiety    • Anxiety and depression    • Arthritis    • Atypical squamous cells of undetermined significance (ASC-US) on cervical Pap smear    • Back pain     back spasms and leg spasms   • Backache    • Bronchitis    • Constipation    • Depression    • Dysplasia of cervix     stage 2   • Encounter for  visit    • Encounter for routine gynecological examination    • Epigastric pain    • Exanthematous disorder    • GERD (gastroesophageal reflux disease)    • HPV (human papilloma virus) infection    • Hypertensive disorder     resolved (with pregnancy)   • IBS (irritable bowel syndrome)    • Low back pain    • Migraine    • Obesity    •  Pap smear abnormality of cervix     grade 2 dysplagia   • PCOS (polycystic ovarian syndrome)    • Polycystic ovaries    • Psoriasis    • Psychogenic pruritus    • Surgery follow-up    • Wears glasses       Past Surgical History:   Procedure Laterality Date   •  SECTION  2012    primary   • CHOLECYSTECTOMY     • COLONOSCOPY     • ENDOSCOPY     • FRACTURE SURGERY      boxers fracture-2016   • LEEP N/A 2018    Procedure: LOOP ELECTROCAUTERY EXCISION PROCEDURE;  Surgeon: Adrienne Diego MD;  Location: [facility];  Service: Obstetrics/Gynecology      Social History     Socioeconomic History   • Marital status:      Spouse name: Not on file   • Number of children: Not on file   • Years of education: Not on file   • Highest education level: Not on file   Tobacco Use   • Smoking status: Former Smoker     Quit date: 10/1/2016     Years since quittin.4   • Smokeless tobacco: Never Used   Substance and Sexual Activity   • Alcohol use: Yes     Comment: occational    • Drug use: No   • Sexual activity: Yes     Partners: Male      Family History   Problem Relation Age of Onset   • Hypertension Father    • Alcohol abuse Father    • Arthritis Father    • Depression Father    • Hyperlipidemia Father    • Arthritis Mother    • Depression Mother    • Early death Mother    • Depression Sister    • Drug abuse Brother    • Arthritis Maternal Grandmother    • COPD Maternal Grandmother    • Cancer Maternal Grandmother    • Depression Maternal Grandmother    • Diabetes Maternal Grandmother    • Heart disease Maternal Grandmother    • Hypertension Maternal Grandmother    • Thyroid disease Maternal Grandmother    • Lymphoma Maternal Grandmother    • Arthritis Maternal Grandfather    • Hypertension Maternal Grandfather    • Arthritis Paternal Grandmother    • Depression Paternal Grandmother    • Heart disease Paternal Grandmother    • Hypertension Paternal Grandmother    • Cancer Paternal Grandmother    • Breast  cancer Paternal Grandmother    • Arthritis Paternal Grandfather    • Heart disease Paternal Grandfather    • Hypertension Paternal Grandfather    • Kidney disease Paternal Uncle    • Learning disabilities Neg Hx       Allergies   Allergen Reactions   • Codeine Confusion and Hallucinations   • Dilaudid [Hydromorphone Hcl] Nausea And Vomiting     Heart palpitations and hives          Current Outpatient Medications:   •  cetirizine (zyrTEC) 10 MG tablet, Take 10 mg by mouth Every Night., Disp: , Rfl:   •  desvenlafaxine (Pristiq) 50 MG 24 hr tablet, Take 1 tablet by mouth Daily., Disp: 30 tablet, Rfl: 2  •  melatonin 3 MG tablet, Take 1.5 mg by mouth every night at bedtime., Disp: , Rfl:   •  metFORMIN ER (Glucophage XR) 500 MG 24 hr tablet, 500 mg w supper, Disp: 30 tablet, Rfl: 11  •  Multiple Vitamins-Minerals (MULTIVITAMIN ADULT PO), Take 1 tablet by mouth Daily., Disp: , Rfl:   •  omeprazole (priLOSEC) 20 MG capsule, Take 1 capsule by mouth Every Night., Disp: 90 capsule, Rfl: 1  •  Semaglutide,0.25 or 0.5MG/DOS, (Ozempic, 0.25 or 0.5 MG/DOSE,) 2 MG/1.5ML solution pen-injector, Inject 0.5 mg under the skin into the appropriate area as directed 1 (One) Time Per Week. 0.5 mg weekly, Disp: 1 pen, Rfl: 11  •  vitamin D3 (Vitamin D) 125 MCG (5000 UT) capsule capsule, Take 5,000 Units by mouth Daily., Disp: , Rfl:   •  dexamethasone (DECADRON) 1 MG tablet, , Disp: , Rfl:     Objective     Vital Signs  Temp:  [97.5 °F (36.4 °C)] 97.5 °F (36.4 °C)  Heart Rate:  [82-94] 82  BP: (131-134)/(83-87) 131/83  Body mass index is 44.64 kg/m².      03/04/21  1045   Weight: 129 kg (285 lb)       Physical Exam  Vitals signs reviewed.   Constitutional:       Appearance: She is obese.   Eyes:      Pupils: Pupils are equal, round, and reactive to light.   Cardiovascular:      Rate and Rhythm: Normal rate and regular rhythm.   Pulmonary:      Effort: Pulmonary effort is normal.   Abdominal:      General: Bowel sounds are normal.       Palpations: Abdomen is soft.   Musculoskeletal: Normal range of motion.   Skin:     General: Skin is warm and dry.   Neurological:      Mental Status: She is alert and oriented to person, place, and time.   Psychiatric:         Mood and Affect: Mood normal.         Behavior: Behavior normal.            Results Review:   I reviewed the patient's new clinical results.      Patient's Body mass index is 44.64 kg/m². BMI is above normal parameters. Recommendations include: educational material, exercise counseling and nutrition counseling.      Assessment/Plan   Problem List Items Addressed This Visit        Endocrine and Metabolic    Vitamin D deficiency    Class 3 severe obesity due to excess calories with body mass index (BMI) of 40.0 to 44.9 in adult (CMS/[facility]) - Primary       Gastrointestinal Abdominal     GERD (gastroesophageal reflux disease)    Relevant Medications    omeprazole (priLOSEC) 20 MG capsule       Mental Health    Major depressive disorder with current active episode    Relevant Medications    desvenlafaxine (Pristiq) 50 MG 24 hr tablet             I discussed the patient's findings and my recommendations with patient.     I have also recommended that she obtain cardiac and psychiatric evaluation prior to surgery consideration.She has been provided a structured dietary regimen based off of her behavior.  I discussed with the patient the etiology of the disease of obesity and the potential comorbid conditions associated with this disease.  She was instructed to follow the dietary regimen and follow-up with our program in 1 month's time with any additional questions as they may arise during this time.  We emphasized on focusing on proteins and meals high in fiber as well as adequate hydration that exceed 64 ounces of water daily.    I explained that I anticipate the patient to lose weight prior to her next monthly visit.  I have also explained that they need to record or document when they are going to  "have the \"cheat day\".    The dietitian will see patient today.  Patient suffers with hypertension, PCOS, irritable bowel syndrome, depression, fatigue, back pain, and heartburn which can all be reduced if not eliminated by focusing on obesity.  She will do a food journal and bring it back to her next appointment.  She will see myself and the dietitian next month.    Lexie Moody, KIERRA     03/04/21  14:05 CST    "

## 2021-04-06 ENCOUNTER — TELEPHONE (OUTPATIENT)
Dept: BARIATRICS/WEIGHT MGMT | Facility: CLINIC | Age: 41
End: 2021-04-06

## 2021-04-07 ENCOUNTER — OFFICE VISIT (OUTPATIENT)
Dept: BARIATRICS/WEIGHT MGMT | Facility: CLINIC | Age: 41
End: 2021-04-07

## 2021-04-07 VITALS
BODY MASS INDEX: 43.88 KG/M2 | SYSTOLIC BLOOD PRESSURE: 123 MMHG | HEART RATE: 76 BPM | OXYGEN SATURATION: 96 % | WEIGHT: 279.6 LBS | TEMPERATURE: 98.5 F | HEIGHT: 67 IN | DIASTOLIC BLOOD PRESSURE: 82 MMHG

## 2021-04-07 DIAGNOSIS — E66.01 CLASS 3 SEVERE OBESITY DUE TO EXCESS CALORIES WITH SERIOUS COMORBIDITY AND BODY MASS INDEX (BMI) OF 40.0 TO 44.9 IN ADULT (HCC): Primary | ICD-10-CM

## 2021-04-07 DIAGNOSIS — R06.83 SNORING: ICD-10-CM

## 2021-04-07 DIAGNOSIS — K21.9 GASTROESOPHAGEAL REFLUX DISEASE, UNSPECIFIED WHETHER ESOPHAGITIS PRESENT: ICD-10-CM

## 2021-04-07 PROCEDURE — 99213 OFFICE O/P EST LOW 20 MIN: CPT | Performed by: NURSE PRACTITIONER

## 2021-04-07 NOTE — PROGRESS NOTES
"Patient Care Team:  Rachel Pierre APRN as PCP - General (Family Medicine)  Marni Lang APRN as Obstetrician (Nurse Practitioner)  Awais Cowan DPM as Consulting Physician (Podiatry)    Reason for Visit:  Medical Vs. Surgical Weight loss    Subjective   Maria E Red is a 40 y.o. female.     Maria E is here for follow-up and continued medical management of her morbid obesity.  She is currently on a prescription diet.  aMria E previously was to apply dietary changes such as following the meal plan as directed.  She admits to eating about 3 meals per day.  As a result she lost weight since her last visit.    Patient states that she is inconsistent with eating 4 meals per day.  She states her fluid intake varies.  Patient is drinking soda 2-3 times per week.  Patient denies smoking.    Review Of Systems:  Review of Systems   Constitutional: Positive for fatigue.   Respiratory: Negative.    Cardiovascular: Negative.    Gastrointestinal: Negative.    Endocrine: Negative.    Musculoskeletal: Positive for back pain.   Psychiatric/Behavioral: Positive for sleep disturbance.         The following portions of the patient's history were reviewed and updated as appropriate: allergies, current medications, past family history, past medical history, past social history, past surgical history, and problem list.    Objective   /82 (BP Location: Right arm, Patient Position: Sitting, Cuff Size: Adult)   Pulse 76   Temp 98.5 °F (36.9 °C)   Ht 170.2 cm (67\")   Wt 127 kg (279 lb 9.6 oz)   SpO2 96%   BMI 43.79 kg/m²       04/07/21  1319   Weight: 127 kg (279 lb 9.6 oz)       Physical Exam  Vitals reviewed.   Constitutional:       Appearance: She is obese.   Cardiovascular:      Rate and Rhythm: Normal rate and regular rhythm.   Pulmonary:      Effort: Pulmonary effort is normal.   Abdominal:      General: Bowel sounds are normal.      Palpations: Abdomen is soft.   Musculoskeletal:         " "General: Normal range of motion.   Skin:     General: Skin is warm and dry.   Neurological:      Mental Status: She is alert and oriented to person, place, and time.   Psychiatric:         Mood and Affect: Mood normal.         Behavior: Behavior normal.         Patient's Body mass index is 43.79 kg/m². BMI is above normal parameters. Recommendations include: educational material, exercise counseling and nutrition counseling.     Assessment/Plan     Encounter Diagnoses   Name Primary?   • Class 3 severe obesity due to excess calories with serious comorbidity and body mass index (BMI) of 40.0 to 44.9 in adult (CMS/[facility]) Yes   • Gastroesophageal reflux disease, unspecified whether esophagitis present    • Snoring        Maria E Red was seen today for follow-up, obesity, nutrition counseling and weight loss.  She has lost weight since her last visit.  Today we discussed healthy changes in lifestyle, diet, and exercise. Dietician consultation obtained.  Maria E Red had received handouts to her explaining the recommendation on portion sizes/appetite control/reading nutrition labels.   Intensive behavioral therapy for obesity was done today as well.   She has been provided a structured dietary regimen based off of her behavior.  I discussed with the patient the etiology of the disease of obesity and the potential comorbid conditions associated with this disease.  She was instructed to follow the dietary regimen and follow-up with our program in 1 month's time with any additional questions as they may arise during this time.  We emphasized on focusing on proteins and meals high in fiber as well as adequate hydration that exceed 64 ounces of water daily.   I have also explained that they need to record or document when they are going to have the \"cheat day\".      Goals for this month are:   1.  Patient encouraged to follow prescription meal plan and eat 4 meals per day consistently.  2.  Fully eliminate " soda by next appointment.      Follow up in one month for a weight recheck and to discuss proceeding with surgery with Dr. Schafer.  Patient states that she has not decided if she wants to have bariatric surgery and would like to do 3 months of medical weight loss first then decide.    A total of 20 minutes was spent face to face with this patient and over half of the time was spent on counseling and coordination of care for the disease of obesity. We specifically reviewed the dietary prescription and I made recommendations toward increasing exercise as tolerated as well as focusing on training their behavior toward storing less.

## 2021-05-04 RX ORDER — DESVENLAFAXINE SUCCINATE 50 MG/1
50 TABLET, EXTENDED RELEASE ORAL DAILY
Qty: 30 TABLET | Refills: 2 | Status: SHIPPED | OUTPATIENT
Start: 2021-05-04 | End: 2021-08-20 | Stop reason: SDUPTHER

## 2021-05-17 ENCOUNTER — TELEPHONE (OUTPATIENT)
Dept: BARIATRICS/WEIGHT MGMT | Facility: CLINIC | Age: 41
End: 2021-05-17

## 2021-05-18 ENCOUNTER — OFFICE VISIT (OUTPATIENT)
Dept: BARIATRICS/WEIGHT MGMT | Facility: CLINIC | Age: 41
End: 2021-05-18

## 2021-05-18 VITALS
BODY MASS INDEX: 42.38 KG/M2 | WEIGHT: 270 LBS | TEMPERATURE: 99.1 F | SYSTOLIC BLOOD PRESSURE: 123 MMHG | OXYGEN SATURATION: 96 % | HEIGHT: 67 IN | DIASTOLIC BLOOD PRESSURE: 85 MMHG | HEART RATE: 89 BPM

## 2021-05-18 DIAGNOSIS — E66.01 CLASS 3 SEVERE OBESITY DUE TO EXCESS CALORIES WITH SERIOUS COMORBIDITY AND BODY MASS INDEX (BMI) OF 40.0 TO 44.9 IN ADULT (HCC): Primary | ICD-10-CM

## 2021-05-18 DIAGNOSIS — K21.9 GASTROESOPHAGEAL REFLUX DISEASE, UNSPECIFIED WHETHER ESOPHAGITIS PRESENT: ICD-10-CM

## 2021-05-18 DIAGNOSIS — I10 ESSENTIAL HYPERTENSION: ICD-10-CM

## 2021-05-18 PROCEDURE — 99203 OFFICE O/P NEW LOW 30 MIN: CPT | Performed by: SURGERY

## 2021-05-18 NOTE — PROGRESS NOTES
"Patient Care Team:  Rachel Pierre APRN as PCP - General (Family Medicine)  Precious, KIERRA Belcher as Obstetrician (Nurse Practitioner)  Awais Cowan DPM as Consulting Physician (Podiatry)    Reason for Visit:  Surgical Weight loss      Subjective   Maria E Red is a 40 y.o. female.     Maria E is here for follow-up and continued medical management of her morbid obesity.  She is currently on a prescription diet.  Maria E previously was to apply dietary changes such as following the meal plan as directed.  She admits to struggling to follow the dietary prescription consistently.  She states that her biggest struggles are eating 4 times a day due to not feeling hungry.    Since her last visit she lost weight since her last visit.    Review Of Systems:  General ROS: positive for  - sleep disturbance  Respiratory ROS: no cough, shortness of breath, or wheezing  Cardiovascular ROS: no chest pain or dyspnea on exertion  positive for - edema  Gastrointestinal ROS: no abdominal pain, change in bowel habits, or black or bloody stools  positive for - constipation and heartburn    The following portions of the patient's history were reviewed and updated as appropriate: allergies, current medications, past family history, past medical history, past social history, past surgical history and problem list.    Objective   /85 (BP Location: Right arm, Patient Position: Sitting, Cuff Size: Adult)   Pulse 89   Temp 99.1 °F (37.3 °C)   Ht 170.2 cm (67\")   Wt 122 kg (270 lb)   SpO2 96%   BMI 42.29 kg/m²       05/18/21  1008   Weight: 122 kg (270 lb)       General Appearance:  awake, alert, oriented, in no acute distress  Lungs:  Normal expansion.  Clear to auscultation.  No rales, rhonchi, or wheezing.  Heart:  Heart regular rate and rhythm  Abdomen:  Soft, non-tender, normal bowel sounds; no bruits, organomegaly or masses.  Abnormal shape: obese    Assessment/Plan     Encounter Diagnoses   Name " Primary?   • Class 3 severe obesity due to excess calories with serious comorbidity and body mass index (BMI) of 40.0 to 44.9 in adult (CMS/[facility]) Yes   • Essential hypertension    • Gastroesophageal reflux disease, unspecified whether esophagitis present        Maria E Red was seen today for follow-up, obesity, nutrition counseling and weight loss.  She has lost weight since her last visit.  Today we discussed healthy changes in lifestyle, diet, and exercise. Dietician consultation obtained.  Maria E Red had received handouts to her explaining the recommendation on portion sizes/appetite control/reading nutrition labels.   Intensive behavioral therapy for obesity was done today as well.   Goals for this month are: The patient and I reviewed the dietary prescription I have modified it to help facilitate compliance.  I have removed her listed third meal and recommended she follow eating meals 1, 2 and 4.  The modifications also included increasing in the total amounts of vegetables by adding 1 unit of group B to meals 1 and 4.    Follow up in one month for a weight recheck.

## 2021-06-14 ENCOUNTER — TELEPHONE (OUTPATIENT)
Dept: BARIATRICS/WEIGHT MGMT | Facility: CLINIC | Age: 41
End: 2021-06-14

## 2021-08-19 ENCOUNTER — LAB (OUTPATIENT)
Dept: LAB | Facility: HOSPITAL | Age: 41
End: 2021-08-19

## 2021-08-19 ENCOUNTER — TRANSCRIBE ORDERS (OUTPATIENT)
Dept: LAB | Facility: HOSPITAL | Age: 41
End: 2021-08-19

## 2021-08-19 DIAGNOSIS — Z79.899 ENCOUNTER FOR LONG-TERM (CURRENT) USE OF OTHER MEDICATIONS: Primary | ICD-10-CM

## 2021-08-19 DIAGNOSIS — Z79.899 ENCOUNTER FOR LONG-TERM (CURRENT) USE OF OTHER MEDICATIONS: ICD-10-CM

## 2021-08-19 LAB
ALBUMIN SERPL-MCNC: 4.3 G/DL (ref 3.5–5.2)
ALBUMIN/GLOB SERPL: 1.4 G/DL
ALP SERPL-CCNC: 76 U/L (ref 39–117)
ALT SERPL W P-5'-P-CCNC: 39 U/L (ref 1–33)
ANION GAP SERPL CALCULATED.3IONS-SCNC: 9.8 MMOL/L (ref 5–15)
AST SERPL-CCNC: 31 U/L (ref 1–32)
BASOPHILS # BLD AUTO: 0.02 10*3/MM3 (ref 0–0.2)
BASOPHILS NFR BLD AUTO: 0.2 % (ref 0–1.5)
BILIRUB SERPL-MCNC: 0.6 MG/DL (ref 0–1.2)
BUN SERPL-MCNC: 11 MG/DL (ref 6–20)
BUN/CREAT SERPL: 16.2 (ref 7–25)
CALCIUM SPEC-SCNC: 9.2 MG/DL (ref 8.6–10.5)
CHLORIDE SERPL-SCNC: 102 MMOL/L (ref 98–107)
CO2 SERPL-SCNC: 25.2 MMOL/L (ref 22–29)
CREAT SERPL-MCNC: 0.68 MG/DL (ref 0.57–1)
DEPRECATED RDW RBC AUTO: 41.3 FL (ref 37–54)
EOSINOPHIL # BLD AUTO: 0.1 10*3/MM3 (ref 0–0.4)
EOSINOPHIL NFR BLD AUTO: 1.2 % (ref 0.3–6.2)
ERYTHROCYTE [DISTWIDTH] IN BLOOD BY AUTOMATED COUNT: 12.6 % (ref 12.3–15.4)
GFR SERPL CREATININE-BSD FRML MDRD: 96 ML/MIN/1.73
GLOBULIN UR ELPH-MCNC: 3 GM/DL
GLUCOSE SERPL-MCNC: 78 MG/DL (ref 65–99)
HCT VFR BLD AUTO: 39.4 % (ref 34–46.6)
HGB BLD-MCNC: 13.1 G/DL (ref 12–15.9)
IMM GRANULOCYTES # BLD AUTO: 0.02 10*3/MM3 (ref 0–0.05)
IMM GRANULOCYTES NFR BLD AUTO: 0.2 % (ref 0–0.5)
LYMPHOCYTES # BLD AUTO: 3.35 10*3/MM3 (ref 0.7–3.1)
LYMPHOCYTES NFR BLD AUTO: 41.5 % (ref 19.6–45.3)
MCH RBC QN AUTO: 30 PG (ref 26.6–33)
MCHC RBC AUTO-ENTMCNC: 33.2 G/DL (ref 31.5–35.7)
MCV RBC AUTO: 90.4 FL (ref 79–97)
MONOCYTES # BLD AUTO: 0.46 10*3/MM3 (ref 0.1–0.9)
MONOCYTES NFR BLD AUTO: 5.7 % (ref 5–12)
NEUTROPHILS NFR BLD AUTO: 4.12 10*3/MM3 (ref 1.7–7)
NEUTROPHILS NFR BLD AUTO: 51.2 % (ref 42.7–76)
NRBC BLD AUTO-RTO: 0 /100 WBC (ref 0–0.2)
PLATELET # BLD AUTO: 269 10*3/MM3 (ref 140–450)
PMV BLD AUTO: 10.4 FL (ref 6–12)
POTASSIUM SERPL-SCNC: 4.1 MMOL/L (ref 3.5–5.2)
PROT SERPL-MCNC: 7.3 G/DL (ref 6–8.5)
RBC # BLD AUTO: 4.36 10*6/MM3 (ref 3.77–5.28)
SODIUM SERPL-SCNC: 137 MMOL/L (ref 136–145)
WBC # BLD AUTO: 8.07 10*3/MM3 (ref 3.4–10.8)

## 2021-08-19 PROCEDURE — 85025 COMPLETE CBC W/AUTO DIFF WBC: CPT

## 2021-08-19 PROCEDURE — 36415 COLL VENOUS BLD VENIPUNCTURE: CPT

## 2021-08-19 PROCEDURE — 80053 COMPREHEN METABOLIC PANEL: CPT

## 2021-08-20 RX ORDER — DESVENLAFAXINE SUCCINATE 50 MG/1
50 TABLET, EXTENDED RELEASE ORAL DAILY
Qty: 30 TABLET | Refills: 0 | Status: SHIPPED | OUTPATIENT
Start: 2021-08-20 | End: 2021-09-24 | Stop reason: SDUPTHER

## 2021-09-13 ENCOUNTER — LAB (OUTPATIENT)
Dept: LAB | Facility: OTHER | Age: 41
End: 2021-09-13

## 2021-09-13 ENCOUNTER — OFFICE VISIT (OUTPATIENT)
Dept: FAMILY MEDICINE CLINIC | Facility: CLINIC | Age: 41
End: 2021-09-13

## 2021-09-13 VITALS
TEMPERATURE: 97.4 F | DIASTOLIC BLOOD PRESSURE: 78 MMHG | OXYGEN SATURATION: 99 % | HEIGHT: 67 IN | BODY MASS INDEX: 42.06 KG/M2 | SYSTOLIC BLOOD PRESSURE: 126 MMHG | WEIGHT: 268 LBS | HEART RATE: 77 BPM

## 2021-09-13 DIAGNOSIS — Z01.419 ENCOUNTER FOR GYNECOLOGICAL EXAMINATION: Primary | ICD-10-CM

## 2021-09-13 DIAGNOSIS — Z12.31 BREAST CANCER SCREENING BY MAMMOGRAM: ICD-10-CM

## 2021-09-13 DIAGNOSIS — F41.1 GENERALIZED ANXIETY DISORDER: ICD-10-CM

## 2021-09-13 DIAGNOSIS — Z01.419 ENCOUNTER FOR GYNECOLOGICAL EXAMINATION: ICD-10-CM

## 2021-09-13 PROCEDURE — 99396 PREV VISIT EST AGE 40-64: CPT | Performed by: NURSE PRACTITIONER

## 2021-09-16 LAB
LAB AP CASE REPORT: NORMAL
PATH INTERP SPEC-IMP: NORMAL

## 2021-09-17 RX ORDER — FLUCONAZOLE 150 MG/1
150 TABLET ORAL ONCE
Qty: 1 TABLET | Refills: 0 | Status: SHIPPED | OUTPATIENT
Start: 2021-09-17 | End: 2021-09-18

## 2021-09-24 RX ORDER — DESVENLAFAXINE SUCCINATE 50 MG/1
50 TABLET, EXTENDED RELEASE ORAL DAILY
Qty: 90 TABLET | Refills: 0 | Status: SHIPPED | OUTPATIENT
Start: 2021-09-24 | End: 2022-01-03 | Stop reason: SDUPTHER

## 2021-11-15 NOTE — PROGRESS NOTES
Subjective   Maria E Red is a 41 y.o. female. Patient presents today for a pap smear.  Last period was in February of this year.  Menses is irregular due to PCOS.  Tells me that she has not had one that wasn't brought on by medication.    Will also be needing a mammogram order.  Is requesting referral to counseling.    History of Present Illness     The following portions of the patient's history were reviewed and updated as appropriate: allergies, current medications, past family history, past medical history, past social history, past surgical history and problem list.    Review of Systems   Constitutional: Negative for chills, fatigue and fever.   HENT: Negative for congestion, sneezing, sore throat and trouble swallowing.    Eyes: Negative for visual disturbance.   Respiratory: Negative for cough, chest tightness, shortness of breath and wheezing.    Cardiovascular: Negative for chest pain, palpitations and leg swelling.   Gastrointestinal: Negative for abdominal pain, constipation, diarrhea, nausea and vomiting.   Genitourinary: Negative for dysuria, frequency and urgency.   Musculoskeletal: Negative for neck pain.   Skin: Negative for rash.   Neurological: Negative for dizziness, weakness and headaches.   Psychiatric/Behavioral:        In the past two weeks the pt has not felt down, depressed, hopeless or lost interest in doing things   All other systems reviewed and are negative.      Objective   Physical Exam  Vitals and nursing note reviewed.   Constitutional:       General: She is not in acute distress.     Appearance: Normal appearance. She is well-developed. She is not ill-appearing.   HENT:      Head: Normocephalic and atraumatic.      Right Ear: External ear normal.      Left Ear: External ear normal.      Nose: Nose normal.   Eyes:      General: No scleral icterus.        Right eye: No discharge.         Left eye: No discharge.      Conjunctiva/sclera: Conjunctivae normal.      Pupils:  Pupils are equal, round, and reactive to light.   Neck:      Thyroid: No thyromegaly.   Cardiovascular:      Rate and Rhythm: Normal rate and regular rhythm.      Heart sounds: Normal heart sounds. No murmur heard.  No friction rub. No gallop.    Pulmonary:      Effort: Pulmonary effort is normal. No respiratory distress.      Breath sounds: Normal breath sounds. No wheezing or rales.   Chest:   Breasts:      Right: No inverted nipple, nipple discharge, skin change or tenderness.      Left: No inverted nipple, nipple discharge, skin change or tenderness.       Abdominal:      General: Bowel sounds are normal. There is no distension.      Palpations: Abdomen is soft.      Tenderness: There is no abdominal tenderness. There is no guarding or rebound.   Genitourinary:     General: Normal vulva.      Exam position: Lithotomy position.      Vagina: No vaginal discharge, erythema or lesions.      Cervix: No cervical motion tenderness, friability, lesion or erythema.      Uterus: Normal. Not enlarged and not tender.       Adnexa: Right adnexa normal and left adnexa normal.   Musculoskeletal:         General: Normal range of motion.      Cervical back: Normal range of motion and neck supple.   Lymphadenopathy:      Cervical: No cervical adenopathy.   Skin:     General: Skin is warm and dry.      Findings: No rash.   Neurological:      Mental Status: She is alert and oriented to person, place, and time.      Cranial Nerves: No cranial nerve deficit.   Psychiatric:         Behavior: Behavior normal.         Thought Content: Thought content normal.         Judgment: Judgment normal.         Assessment/Plan   Diagnoses and all orders for this visit:    1. Encounter for gynecological examination (Primary)  -     Liquid-based Pap Smear, Screening; Future    2. Breast cancer screening by mammogram  -     Mammo Screening Digital Tomosynthesis Bilateral With CAD    3. Generalized anxiety disorder  -     Ambulatory Referral to  Behavioral Health    Will keep on same meds at this time.  Send to behavioral health for their surveillance.

## 2022-01-03 RX ORDER — DESVENLAFAXINE SUCCINATE 50 MG/1
50 TABLET, EXTENDED RELEASE ORAL DAILY
Qty: 90 TABLET | Refills: 0 | Status: SHIPPED | OUTPATIENT
Start: 2022-01-03 | End: 2022-04-05 | Stop reason: SDUPTHER

## 2022-02-18 RX ORDER — METFORMIN HYDROCHLORIDE 500 MG/1
500 TABLET, EXTENDED RELEASE ORAL DAILY
Qty: 30 TABLET | Refills: 0 | Status: SHIPPED | OUTPATIENT
Start: 2022-02-18 | End: 2022-04-21

## 2022-03-03 ENCOUNTER — OFFICE VISIT (OUTPATIENT)
Dept: SLEEP MEDICINE | Facility: HOSPITAL | Age: 42
End: 2022-03-03

## 2022-03-03 VITALS
HEART RATE: 80 BPM | HEIGHT: 67 IN | SYSTOLIC BLOOD PRESSURE: 146 MMHG | WEIGHT: 264 LBS | BODY MASS INDEX: 41.44 KG/M2 | OXYGEN SATURATION: 97 % | DIASTOLIC BLOOD PRESSURE: 90 MMHG

## 2022-03-03 DIAGNOSIS — G47.26 CIRCADIAN RHYTHM SLEEP DISORDER, SHIFT WORK TYPE: ICD-10-CM

## 2022-03-03 DIAGNOSIS — E66.01 MORBID OBESITY: ICD-10-CM

## 2022-03-03 DIAGNOSIS — R06.83 SNORING: Primary | ICD-10-CM

## 2022-03-03 PROCEDURE — 99212 OFFICE O/P EST SF 10 MIN: CPT | Performed by: NURSE PRACTITIONER

## 2022-03-03 NOTE — PROGRESS NOTES
Sleep Clinic Follow Up    Date: 3/3/2022  Primary Care Provider: Rachel Pierre APRN    Last office visit: 2021 (I reviewed this note)    CC: Follow up: Snoring, daytime sleepiness      Interim History:  Since the last visit:    1) Snoring -  Maria E Red has continued snoring. She reports this is possibly less than before. Symptoms of daytime sleepiness are not as drastic as before. She does still work night shift on the weekends and sleeps during the night throughout the week. This is not much of a problem for her.  She occasionally reports jerking as she is falling asleep but denies urge symptoms for restless legs.  She previously had non-diagnostic sleep testing and is not interested in further testing at this time. I advised her to call if her symptoms worsen or persist and we can follow up as needed.    Sleep Testin. PSG on 2021, AHI of 3.8       Bed time: 5333-4022, 0900 on weekends  Sleep latency: 5-60 minutes  Number of times awakens during the night: 0-1  Wake time: 0600, 1600 on weekends  Estimated total sleep time at night: 6 hours  Caffeine intake: 0-1 cups of coffee, 2-4 cups of tea, and 0-3 sodas per day  Alcohol intake: 0-1 drinks per week  Nap time: rare   Sleepiness with Driving: rare     Selby - 6    PMHx, FH, SH reviewed and pertinent changes are: Reportedly unchanged from last office visit with us.      REVIEW OF SYSTEMS:   Negative for chest pain, SOA, fever, chills, cough, N/V/D, abdominal pain.    Smoking:none    Maria E Red  reports that she quit smoking about 5 years ago. She has never used smokeless tobacco.    Exam:  Vitals:    22 1032   BP: 146/90   Pulse: 80   SpO2: 97%           22  1032   Weight: 120 kg (264 lb)     Body mass index is 41.34 kg/m². Patient's Body mass index is 41.34 kg/m². indicating that she is morbidly obese (BMI > 40 or > 35 with obesity - related health condition). Obesity-related health conditions include the  following: GERD. Obesity is unchanged. BMI is is above average; BMI management plan is completed. I recommend portion control and increasing exercise.      Gen:                No distress, conversant, pleasant, appears stated age, alert, oriented  Eyes:               Anicteric sclera, moist conjunctiva, no lid lag                           PERRL, EOMI   Heent:             NC/AT                          Oropharynx clear                          Normal hearing  Lungs:             Normal effort, non-labored breathing                          Clear to auscultation bilaterally          CV:                  Normal S1/S2, no murmur                          No lower extremity edema  ABD:               Soft, rounded, non-distended                          Normal bowel sounds                    Psych:             Appropriate affect  Neuro:             CN 2-12 appear intact    Past Medical History:   Diagnosis Date   • Anxiety    • Anxiety and depression    • Arthritis    • Atypical squamous cells of undetermined significance (ASC-US) on cervical Pap smear    • Back pain     back spasms and leg spasms   • Backache    • Bronchitis    • Constipation    • Depression    • Dysplasia of cervix     stage 2   • Encounter for  visit    • Encounter for routine gynecological examination    • Epigastric pain    • Exanthematous disorder    • GERD (gastroesophageal reflux disease)    • HPV (human papilloma virus) infection    • Hypertensive disorder     resolved (with pregnancy)   • IBS (irritable bowel syndrome)    • Low back pain    • Migraine    • Obesity    • Pap smear abnormality of cervix     grade 2 dysplagia   • PCOS (polycystic ovarian syndrome)    • Polycystic ovaries    • Psoriasis    • Psychogenic pruritus    • Surgery follow-up    • Wears glasses        Current Outpatient Medications:   •  cetirizine (zyrTEC) 10 MG tablet, Take 10 mg by mouth Every Night., Disp: , Rfl:   •  clobetasol (TEMOVATE) 0.05 % external  solution, Apply topically to the appropriate area of scalp as directed 2 (two) times a day., Disp: 50 mL, Rfl: 11  •  clobetasol (TEMOVATE) 0.05 % ointment, Apply to the appropriate area on the body as directed 2 (two) times daily. Limit duration to 2 week periods. Do NOT apply to face, groin, or folds., Disp: 60 g, Rfl: 5  •  desvenlafaxine (Pristiq) 50 MG 24 hr tablet, Take 1 tablet by mouth Daily., Disp: 90 tablet, Rfl: 0  •  melatonin 3 MG tablet, Take 1.5 mg by mouth every night at bedtime., Disp: , Rfl:   •  metFORMIN ER (Glucophage XR) 500 MG 24 hr tablet, Take 1 tablet by mouth Daily with supper., Disp: 30 tablet, Rfl: 0  •  Multiple Vitamins-Minerals (MULTIVITAMIN ADULT PO), Take 1 tablet by mouth Daily., Disp: , Rfl:   •  omeprazole (priLOSEC) 20 MG capsule, Take 1 capsule by mouth Every Night., Disp: 90 capsule, Rfl: 1  •  Semaglutide,0.25 or 0.5MG/DOS, (Ozempic, 0.25 or 0.5 MG/DOSE,) 2 MG/1.5ML solution pen-injector, Inject 0.5 mg under the skin into the appropriate area as directed 1 (One) Time Per Week., Disp: 1.5 mL, Rfl: 11  •  vitamin D3 (Vitamin D) 125 MCG (5000 UT) capsule capsule, Take 5,000 Units by mouth Daily., Disp: , Rfl:     WBC   Date Value Ref Range Status   08/19/2021 8.07 3.40 - 10.80 10*3/mm3 Final     RBC   Date Value Ref Range Status   08/19/2021 4.36 3.77 - 5.28 10*6/mm3 Final     Hemoglobin   Date Value Ref Range Status   08/19/2021 13.1 12.0 - 15.9 g/dL Final     Hematocrit   Date Value Ref Range Status   08/19/2021 39.4 34.0 - 46.6 % Final     MCV   Date Value Ref Range Status   08/19/2021 90.4 79.0 - 97.0 fL Final     MCH   Date Value Ref Range Status   08/19/2021 30.0 26.6 - 33.0 pg Final     MCHC   Date Value Ref Range Status   08/19/2021 33.2 31.5 - 35.7 g/dL Final     RDW   Date Value Ref Range Status   08/19/2021 12.6 12.3 - 15.4 % Final     RDW-SD   Date Value Ref Range Status   08/19/2021 41.3 37.0 - 54.0 fl Final     MPV   Date Value Ref Range Status   08/19/2021 10.4  6.0 - 12.0 fL Final     Platelets   Date Value Ref Range Status   08/19/2021 269 140 - 450 10*3/mm3 Final     Neutrophil %   Date Value Ref Range Status   08/19/2021 51.2 42.7 - 76.0 % Final     Lymphocyte %   Date Value Ref Range Status   08/19/2021 41.5 19.6 - 45.3 % Final     Monocyte %   Date Value Ref Range Status   08/19/2021 5.7 5.0 - 12.0 % Final     Eosinophil %   Date Value Ref Range Status   08/19/2021 1.2 0.3 - 6.2 % Final     Basophil %   Date Value Ref Range Status   08/19/2021 0.2 0.0 - 1.5 % Final     Immature Grans %   Date Value Ref Range Status   08/19/2021 0.2 0.0 - 0.5 % Final     Neutrophils, Absolute   Date Value Ref Range Status   08/19/2021 4.12 1.70 - 7.00 10*3/mm3 Final     Lymphocytes, Absolute   Date Value Ref Range Status   08/19/2021 3.35 (H) 0.70 - 3.10 10*3/mm3 Final     Monocytes, Absolute   Date Value Ref Range Status   08/19/2021 0.46 0.10 - 0.90 10*3/mm3 Final     Eosinophils, Absolute   Date Value Ref Range Status   08/19/2021 0.10 0.00 - 0.40 10*3/mm3 Final     Basophils, Absolute   Date Value Ref Range Status   08/19/2021 0.02 0.00 - 0.20 10*3/mm3 Final     Immature Grans, Absolute   Date Value Ref Range Status   08/19/2021 0.02 0.00 - 0.05 10*3/mm3 Final     nRBC   Date Value Ref Range Status   08/19/2021 0.0 0.0 - 0.2 /100 WBC Final       Lab Results   Component Value Date    GLUCOSE 78 08/19/2021    BUN 11 08/19/2021    CREATININE 0.68 08/19/2021    EGFRIFNONA 96 08/19/2021    BCR 16.2 08/19/2021    K 4.1 08/19/2021    CO2 25.2 08/19/2021    CALCIUM 9.2 08/19/2021    ALBUMIN 4.30 08/19/2021    AST 31 08/19/2021    ALT 39 (H) 08/19/2021       Assessment and Plan:    1. Snoring - Established, stable (1)  1. Improving  2. Follow up as needed. Can repeat sleep testing if symptoms worsen or persist  2. Shift work sleep disorder - Established, stable (1)   1. No problem at this time  3. Morbid obesity - BMI 41.3 - stable chronic illness   1. Has lost weight since previous visit -  encourage continued weight management      I spent 15 minutes caring for Maria E on this date of service. This time includes time spent by me in the following activities: preparing for the visit, reviewing tests, obtaining and/or reviewing a separately obtained history, performing a medically appropriate examination and/or evaluation , counseling and educating the patient/family/caregiver, documenting information in the medical record and care coordination; discussing Further testing if needed    RTC in 12 months PRN. Patient agrees to return sooner if changes in symptoms.        This document has been electronically signed by KIERRA Martel on March 3, 2022 10:38 CST          CC: Rachel Pierre APRN          No ref. provider found

## 2022-04-05 DIAGNOSIS — F41.9 ANXIETY: Primary | ICD-10-CM

## 2022-04-05 DIAGNOSIS — M25.512 LEFT SHOULDER PAIN, UNSPECIFIED CHRONICITY: Primary | ICD-10-CM

## 2022-04-05 RX ORDER — SEMAGLUTIDE 1.34 MG/ML
0.5 INJECTION, SOLUTION SUBCUTANEOUS WEEKLY
Qty: 1.5 ML | Refills: 11 | Status: CANCELLED | OUTPATIENT
Start: 2022-04-05

## 2022-04-05 RX ORDER — METFORMIN HYDROCHLORIDE 500 MG/1
500 TABLET, EXTENDED RELEASE ORAL DAILY
Qty: 30 TABLET | Refills: 0 | Status: CANCELLED | OUTPATIENT
Start: 2022-04-05

## 2022-04-06 ENCOUNTER — OFFICE VISIT (OUTPATIENT)
Dept: ORTHOPEDIC SURGERY | Facility: CLINIC | Age: 42
End: 2022-04-06

## 2022-04-06 VITALS — HEIGHT: 67 IN | WEIGHT: 265 LBS | BODY MASS INDEX: 41.59 KG/M2

## 2022-04-06 DIAGNOSIS — M25.512 LEFT SHOULDER PAIN, UNSPECIFIED CHRONICITY: ICD-10-CM

## 2022-04-06 DIAGNOSIS — M75.22 BICEPS TENDINITIS OF LEFT SHOULDER: ICD-10-CM

## 2022-04-06 DIAGNOSIS — M25.312 SHOULDER INSTABILITY, LEFT: Primary | ICD-10-CM

## 2022-04-06 PROCEDURE — 99214 OFFICE O/P EST MOD 30 MIN: CPT | Performed by: ORTHOPAEDIC SURGERY

## 2022-04-06 RX ORDER — DESVENLAFAXINE SUCCINATE 50 MG/1
50 TABLET, EXTENDED RELEASE ORAL DAILY
Qty: 30 TABLET | Refills: 0 | Status: SHIPPED | OUTPATIENT
Start: 2022-04-06 | End: 2022-06-27 | Stop reason: SDUPTHER

## 2022-04-06 NOTE — PROGRESS NOTES
"Maria E Red is a 41 y.o. female   Primary provider:  Rachel Pierre APRN       Chief Complaint   Patient presents with   • Left Shoulder - Pain, Initial Evaluation     X-rays done today in office  HISTORY OF PRESENT ILLNESS: Ms. Red is a 41-year-old right-hand-dominant female who presents orthopedic clinic for evaluation of left-sided shoulder and back pain.  The patient states that her most recent symptoms started approximately 2 weeks prior where she had stiffness in her neck and and not in her trapezius.  This resolved with oral NSAIDs and Tylenol.  The patient does report a history of instability in the shoulder.  However, she does not relate a specific traumatic event.  Her symptoms of instability are reported as her chiropractor stated that her shoulder \"was out of place\" and and that he was required \"to put it back in place.\"  She denies waking up with her shoulder dislocated.  She denies any numbness or tingling in her entire left upper extremity.      Prior shoulder treatments:   []  PT   []  HEP   [x]  Activity modification   [x]  NSAIDS   []  Subacromial space steroid injection     []  Glenohumeral joint steroid injection   []  Acromioclavicular joint steroid injection  []  Long biceps tendon sheath steroid injection    Pain is severe at times and worse with activity.      Pain  This is a new problem. The current episode started 1 to 4 weeks ago (2 weeks). The problem occurs constantly (moderate). The problem has been unchanged. Associated symptoms include joint swelling and numbness. Associated symptoms comments: Grinding,aching,clicking/popping/swelling. Exacerbated by: use of left arm. She has tried acetaminophen and ice for the symptoms. The treatment provided no relief.        CONCURRENT MEDICAL HISTORY:    Past Medical History:   Diagnosis Date   • Anxiety    • Anxiety and depression    • Arthritis    • Atypical squamous cells of undetermined significance (ASC-US) on cervical Pap " smear    • Back pain     back spasms and leg spasms   • Backache    • Bronchitis    • Constipation    • Depression    • Dysplasia of cervix     stage 2   • Encounter for  visit    • Encounter for routine gynecological examination    • Epigastric pain    • Exanthematous disorder    • GERD (gastroesophageal reflux disease)    • HPV (human papilloma virus) infection    • Hypertensive disorder     resolved (with pregnancy)   • IBS (irritable bowel syndrome)    • Low back pain    • Migraine    • Obesity    • Pap smear abnormality of cervix     grade 2 dysplagia   • PCOS (polycystic ovarian syndrome)    • Polycystic ovaries    • Psoriasis    • Psychogenic pruritus    • Surgery follow-up    • Wears glasses        Allergies   Allergen Reactions   • Codeine Confusion and Hallucinations   • Dilaudid [Hydromorphone Hcl] Nausea And Vomiting     Heart palpitations and hives         Current Outpatient Medications:   •  cetirizine (zyrTEC) 10 MG tablet, Take 10 mg by mouth Every Night., Disp: , Rfl:   •  clobetasol (TEMOVATE) 0.05 % external solution, Apply topically to the appropriate area of scalp as directed 2 (two) times a day., Disp: 50 mL, Rfl: 11  •  clobetasol (TEMOVATE) 0.05 % ointment, Apply to the appropriate area on the body as directed 2 (two) times daily. Limit duration to 2 week periods. Do NOT apply to face, groin, or folds., Disp: 60 g, Rfl: 5  •  desvenlafaxine (Pristiq) 50 MG 24 hr tablet, Take 1 tablet by mouth Daily., Disp: 90 tablet, Rfl: 0  •  melatonin 3 MG tablet, Take 1.5 mg by mouth every night at bedtime., Disp: , Rfl:   •  metFORMIN ER (Glucophage XR) 500 MG 24 hr tablet, Take 1 tablet by mouth Daily with supper., Disp: 30 tablet, Rfl: 0  •  Multiple Vitamins-Minerals (MULTIVITAMIN ADULT PO), Take 1 tablet by mouth Daily., Disp: , Rfl:   •  omeprazole (priLOSEC) 20 MG capsule, Take 1 capsule by mouth Every Night., Disp: 90 capsule, Rfl: 1  •  Semaglutide,0.25 or 0.5MG/DOS, (Ozempic, 0.25 or  0.5 MG/DOSE,) 2 MG/1.5ML solution pen-injector, Inject 0.5 mg under the skin into the appropriate area as directed 1 (One) Time Per Week., Disp: 1.5 mL, Rfl: 11  •  vitamin D3 125 MCG (5000 UT) capsule capsule, Take 5,000 Units by mouth Daily., Disp: , Rfl:     Past Surgical History:   Procedure Laterality Date   •  SECTION  2012    primary   • CHOLECYSTECTOMY     • COLONOSCOPY     • ENDOSCOPY     • FRACTURE SURGERY      boxers fracture-2016   • LEEP N/A 2018    Procedure: LOOP ELECTROCAUTERY EXCISION PROCEDURE;  Surgeon: Adrienne Diego MD;  Location: [facility];  Service: Obstetrics/Gynecology       Family History   Problem Relation Age of Onset   • Hypertension Father    • Alcohol abuse Father    • Arthritis Father    • Depression Father    • Hyperlipidemia Father    • Arthritis Mother    • Depression Mother    • Early death Mother    • Depression Sister    • Drug abuse Brother    • Arthritis Maternal Grandmother    • COPD Maternal Grandmother    • Cancer Maternal Grandmother    • Depression Maternal Grandmother    • Diabetes Maternal Grandmother    • Heart disease Maternal Grandmother    • Hypertension Maternal Grandmother    • Thyroid disease Maternal Grandmother    • Lymphoma Maternal Grandmother    • Arthritis Maternal Grandfather    • Hypertension Maternal Grandfather    • Arthritis Paternal Grandmother    • Depression Paternal Grandmother    • Heart disease Paternal Grandmother    • Hypertension Paternal Grandmother    • Cancer Paternal Grandmother    • Breast cancer Paternal Grandmother    • Arthritis Paternal Grandfather    • Heart disease Paternal Grandfather    • Hypertension Paternal Grandfather    • Kidney disease Paternal Uncle    • Learning disabilities Neg Hx         Social History     Socioeconomic History   • Marital status:    Tobacco Use   • Smoking status: Former Smoker     Quit date: 10/1/2016     Years since quittin.5   • Smokeless tobacco: Never Used   Substance  "and Sexual Activity   • Alcohol use: Yes     Comment: occational    • Drug use: No   • Sexual activity: Yes     Partners: Male        Review of Systems   Eyes:        Dry eyes   Musculoskeletal: Positive for joint swelling.        Muscle pain,stiffness   Neurological: Positive for dizziness and numbness.   Psychiatric/Behavioral: Positive for sleep disturbance. The patient is nervous/anxious.         Mood swings   All other systems reviewed and are negative.      PHYSICAL EXAMINATION:       Ht 170.2 cm (67\")   Wt 120 kg (265 lb)   BMI 41.50 kg/m²     Physical Exam  Constitutional:       General: She is awake.      Appearance: Normal appearance. She is well-developed and normal weight.   Neurological:      Mental Status: She is alert.   Psychiatric:         Behavior: Behavior is cooperative.         GAIT:     [x]  Normal  []  Antalgic    Assistive device: [x]  None  []  Walker     []  Crutches  []  Cane     []  Wheelchair []  Stretcher    FOCUSED MUSCULOSKELETAL EXAMINATION OF THE LEFT SHOULDER    Visual inspection  Atrophy: No  Periscapular Dyskinesia: No  Surgical Incisions: No  Sensation   Axillary, musculocutaneous, lateral/medial antebrachial cutaneous, radial, ulnar, and median nerve distributions: Intact to light touch  Range of motion   Active/Passive  Forward Flexion: 170/170  Abduction: 90/90  External Rotation: 45/45  Internal Rotation: T8/--  Abduction External Rotation: 80/80  Abduction Internal Rotation: 60/60  Tenderness to Palpation   Bicipital Groove: Yes  Greater tuberosity: No  Acromioclavicular Joint: No  Strength   Supraspinatus resisted abduction: 5/5  Infraspinatus resisted external rotation: 5/5  Teres Minor resisted abduction external rotation: 5/5  Subscapularis resisted internal rotation: 5/5  Special testing   Neer's: Negative  Morgan: Negative  Cross body adduction: Negative  Speeds: Negative  Yergason's: Negative  [place]'s: Positive  Maite empty can: Negative  Drop arm: " Negative  External rotation lag sign: Negative  Belly press: Negative  Anterior apprehension-relocation: Negative   Load shifting testin+ anterior and 1+ posterior.   Janett: Negative  Jerk: Negative  Sulcus: Negative  Beighton-Dejuan criteria: 0 out of 9  Vascular  Radial pulse: 2+  Ulnar pulse: 2+  Hand warm and well perfused: Yes      NOTABLE DIAGNOSTIC FINDINGS: Radiographs of the left shoulder from clinic today demonstrate a concentric glenohumeral joint.  There is not any superior migration of the humeral head.  There is not a fracture or dislocation present.  There is not any evidence of any glenoid bone loss.  I personally viewed and interpreted the diagnostics.     ASSESSMENT:    Diagnoses and all orders for this visit:    Shoulder instability, left  -     Ambulatory Referral to Physical Therapy    Left shoulder pain, unspecified chronicity  -     MRI shoulder left wo contrast; Future  -     Ambulatory Referral to Physical Therapy    Biceps tendinitis of left shoulder      PLAN  The patient has subjective symptoms of instability in the shoulder as well as biceps tendinitis.  She has a questional history of instability within the shoulder.  I would like for her to begin physical therapy with focus on range of motion, rotator cuff strengthening, and strengthening of the dynamic stabilizers of the glenohumeral joint.  Additionally I would like to obtain MRI to evaluate for any possible labral pathology due to her history of instability.  We will discuss treatment options based off of the findings from the diagnostic imaging and her response to physical therapy.  She can continue oral anti-inflammatories and Tylenol in the interim.  Return for Recheck; after MRI complete.    Griffin Avery DO        This document has been electronically signed by Griffin Avery DO on 2022 09:48 CDT    Electronically signed by Griffin Avery DO on 22 at 09:48 CDT

## 2022-04-06 NOTE — TELEPHONE ENCOUNTER
I informed Nelli that this patient is due for an annual appointment and needs to schedule. 30 day refill until then.

## 2022-04-13 ENCOUNTER — HOSPITAL ENCOUNTER (OUTPATIENT)
Dept: PHYSICAL THERAPY | Facility: HOSPITAL | Age: 42
Setting detail: THERAPIES SERIES
Discharge: HOME | End: 2022-04-13

## 2022-04-13 ENCOUNTER — HOSPITAL ENCOUNTER (OUTPATIENT)
Dept: MRI IMAGING | Facility: HOSPITAL | Age: 42
Discharge: HOME | End: 2022-04-13
Admitting: ORTHOPAEDIC SURGERY

## 2022-04-13 DIAGNOSIS — M25.512 LEFT SHOULDER PAIN, UNSPECIFIED CHRONICITY: Primary | ICD-10-CM

## 2022-04-13 DIAGNOSIS — M25.512 LEFT SHOULDER PAIN, UNSPECIFIED CHRONICITY: ICD-10-CM

## 2022-04-13 DIAGNOSIS — M25.312 SHOULDER INSTABILITY, LEFT: ICD-10-CM

## 2022-04-13 PROCEDURE — 97161 PT EVAL LOW COMPLEX 20 MIN: CPT

## 2022-04-13 PROCEDURE — 73221 MRI JOINT UPR EXTREM W/O DYE: CPT

## 2022-04-13 NOTE — THERAPY EVALUATION
Outpatient Physical Therapy Ortho Initial Evaluation  [facility]     Patient Name: Maria E Red  : 1980  MRN: [id number]  Today's Date: 2022      Visit Date: 2022     ATTENDANCE:   SUBJECTIVE IMPROVEMENT: not assessed at initial evaluation  NEXT MD APPOINTMENT: 22- MRI results  RECERT DATE: 22    THERAPY DIAGNOSIS: L shoulder pain/instability       Patient Active Problem List   Diagnosis   • Anxiety   • Mood disorder ([facility])   • Major depressive disorder with current active episode   • GERD (gastroesophageal reflux disease)   • Other insomnia   • Back muscle spasm   • Onychomycosis of toenail   • Dysplasia of cervix, high grade MARILEE 2   • Dysplasia of cervix, high grade MARILEE 2   • Vitamin D deficiency   • Low serum vitamin B12   • Generalized anxiety disorder   • Goiter   • Snoring   • Class 3 severe obesity due to excess calories with body mass index (BMI) of 40.0 to 44.9 in adult ([facility])   • Hypertensive disorder   • Left shoulder pain        Past Medical History:   Diagnosis Date   • Anxiety    • Anxiety and depression    • Arthritis    • Atypical squamous cells of undetermined significance (ASC-US) on cervical Pap smear    • Back pain     back spasms and leg spasms   • Backache    • Bronchitis    • Constipation    • Depression    • Dysplasia of cervix     stage 2   • Encounter for  visit    • Encounter for routine gynecological examination    • Epigastric pain    • Exanthematous disorder    • GERD (gastroesophageal reflux disease)    • HPV (human papilloma virus) infection    • Hypertensive disorder     resolved (with pregnancy)   • IBS (irritable bowel syndrome)    • Low back pain    • Migraine    • Obesity    • Pap smear abnormality of cervix     grade 2 dysplagia   • PCOS (polycystic ovarian syndrome)    • Polycystic ovaries    • Psoriasis    • Psychogenic pruritus    • Surgery follow-up    • Wears glasses         Past Surgical History:   Procedure  "Laterality Date   •  SECTION  2012    primary   • CHOLECYSTECTOMY     • COLONOSCOPY     • ENDOSCOPY     • FRACTURE SURGERY      boxers fracture-2016   • LEEP N/A 2018    Procedure: LOOP ELECTROCAUTERY EXCISION PROCEDURE;  Surgeon: Adrienne Diego MD;  Location: [facility];  Service: Obstetrics/Gynecology       Visit Dx:     ICD-10-CM ICD-9-CM   1. Left shoulder pain, unspecified chronicity  M25.512 719.41   2. Shoulder instability, left  M25.312 718.81          Patient History     Row Name 22 1300             History    Chief Complaint Pain  -AC      Type of Pain Shoulder pain;Neck pain  -AC      Brief Description of Current Complaint Pt notes that about 2 weeks ago the left shoulder, upper trap area got swollen to the size of a baseball. Notes after a couple weeks the swelling seemed to go down. Notes that she was tender in through upper trap, down scapula, down lateral shoulder, and in pec. tenderness is improving however still present. Notes that she has had L shoulder pain and instability for many years with clicking/popping/grinding. She has seen a chiropractor and goes every 1-2 months and he has adjusted her L shoulder however notes it \"doesn't seem to stay in place\". Reports that she can feel the shoulder moving around when she pushes on the front of the shoulder. MRI completed this morning with return to MD to discuss on 22. No injections or pain medication rx per report. Notes she uses OTC pain/NSAIDs as needed. Pt has 5 y/o daughter/.  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  -AC      Patient seeing anyone else for problem(s)? Chiropractor  -AC      What clinical tests have you had for this problem? X-ray;MRI  -AC      Results of Clinical " Tests no acute abnormalities on x-ray. MRI unable to view at this time  -AC              Pain     Pain Location Shoulder  -AC      Pain at Present 2  -AC      Pain at Best 1  -AC      Pain at Worst 8  -AC      Pain Frequency Constant/continuous  -AC      Pain Description Aching;Pressure  -AC      What Performance Factors Make the Current Problem(s) WORSE? overuse, occasionally with overhead/reaching backward movements.  -AC      What Performance Factors Make the Current Problem(s) BETTER? OTC pain/NSAIDs, ice/heat, and massage gun.  -AC      Is your sleep disturbed? Yes  -AC      What position do you sleep in? Left sidelying  -AC      Difficulties at work? no report od diffiuclty with work.  -AC      Difficulties with ADL's? occasional increase in pain when she had to do activities quickly.  -AC              Services    Prior Rehab/Home Health Experiences No  -AC              Daily Activities    Primary Language English  -AC            User Key  (r) = Recorded By, (t) = Taken By, (c) = Cosigned By    Initials Name Provider Type    AC Gisela Dimas, PT Physical Therapist                 PT Ortho     Row Name 04/13/22 1300       Subjective Comments    Subjective Comments see pt hx.  -AC       Precautions and Contraindications    Precautions/Limitations no known precautions/limitations  -AC       Subjective Pain    Able to rate subjective pain? yes  -AC    Pre-Treatment Pain Level 2  -AC       Posture/Observations    Posture/Observations Comments forward head and rounded shoulders posture. Palpation tenderness greatest in pec major/minor, suprascap, and middle trap/rhomboids. minor palpation tenderness along upper traps and levator scap.  -AC       Special Tests/Palpation    Special Tests/Palpation Shoulder  -AC       Shoulder Girdle Accessory Motions    Shoulder Girdle Accessory Motions Tested? Yes  -AC    Posterior glide of humerus WNL  -AC    Anterior glide of humerus Hypermobile  -AC    Inferior glide of  humerus Hypermobile  -AC       Shoulder Impingement/Rotator Cuff Special Tests    Morgan-Asad Test (RC Lesion vs. Bursitis) Negative  -AC    Neer Impingement Test (RC Lesion vs. Bursitis) Negative  -AC    Full Can Test (RC Lesion) Negative  -AC    Empty Can Test (RC Lesion) Positive  ant/lateral shoulder pain  -AC    Drop Arm Test (Full Thickness RC Lesion) Negative  -AC    Lift-Off Test (Subscapularis Lesion) Negative  feels tight through ant shoulder  -AC    Belly Press (Subscapularis Lesion) Negative  -AC    Speed's Test (LH of Biceps Lesion) Positive  pulling through anterior/lateral shoulder  -AC       Shoulder Laxity/Instability Special Tests    Sulcus Sign, 0 Degrees Positive  laxity and pain  -AC    Posterior Apprehension Test Negative  -AC    Anterior Apprehension/Relocation Test, at 90 Degrees Positive  -AC       General ROM    LT Upper Ext Lt Shoulder ABduction;Lt Shoulder Extension;Lt Shoulder Flexion;Lt Shoulder External Rotation;Lt Shoulder Internal Rotation  -AC       Left Upper Ext    Lt Shoulder Abduction AROM 180  -AC    Lt Shoulder Flexion AROM 162  tight feeling through top of shoulder  -AC    Lt Shoulder External Rotation AROM 79  pulling through anterior shoulder  -AC    Lt Shoulder Internal Rotation AROM 79  -AC       MMT (Manual Muscle Testing)    Lt Upper Ext Lt Shoulder Flexion;Lt Shoulder Extension;Lt Shoulder ABduction;Lt Shoulder ADduction;Lt Shoulder Internal Rotation;Lt Shoulder External Rotation  -AC    General MMT Comments RUE grossly 4+/5 no c/o pain.  -AC       MMT Left Upper Ext    Lt Shoulder Flexion MMT, Gross Movement (4/5) good  -AC    Lt Shoulder Extension MMT, Gross Movement (4+/5) good plus  -AC    Lt Shoulder ABduction MMT, Gross Movement (4-/5) good minus  pain ant/lateral shoulder  -AC    Lt Shoulder ADduction MMT, Gross Movement (4+/5) good plus  -AC    Lt Shoulder Internal Rotation MMT, Gross Movement (4-/5) good minus  pain ant shoulder/pecs  -AC    Lt Shoulder  External Rotation MMT, Gross Movement (4/5) good  -AC       Sensation    Additional Comments pt notes occasional numbness in lateral shoulder to the 3/4/5 digits.  -AC          User Key  (r) = Recorded By, (t) = Taken By, (c) = Cosigned By    Initials Name Provider Type    Gisela Mcdonald PT Physical Therapist                            Therapy Education  Education Details: stretching- UT, LS, pecs, and biceps  Given: HEP  Program: New  How Provided: Verbal, Demonstration, Written  Provided to: Patient  Level of Understanding: Verbalized, Demonstrated      PT OP Goals     Row Name 04/13/22 1300          PT Short Term Goals    STG Date to Achieve 05/11/22  -AC     STG 1 Pt is indpt with HEP.  -AC     STG 1 Progress New  -AC     STG 2 Pt is able to complete AROM L shoulder without increased pain.  -AC     STG 2 Progress New  -AC            Long Term Goals    LTG Date to Achieve 05/25/22  -AC     LTG 1 Pt demos improved L shoulder MMT to 4+/5 or better in all planes with minimal pain increase.  -AC     LTG 1 Progress New  -AC     LTG 2 Pt demos minimal palpation tenderness along pecs, biceps, and middle trap/rhomboids.  -AC     LTG 2 Progress New  -AC     LTG 3 QuickDASH improved to </= 10.  -AC     LTG 3 Progress New  -AC     LTG 4 Pt notes overall subjecective improvement 80% or better.  -AC     LTG 4 Progress New  -AC     LTG 5 Pt is able to care for and  daughter without increased L shoulder pain.  -AC     LTG 5 Progress New  -AC            Time Calculation    PT Goal Re-Cert Due Date 05/04/22  -           User Key  (r) = Recorded By, (t) = Taken By, (c) = Cosigned By    Initials Name Provider Type    Gisela Mcdonald PT Physical Therapist                 PT Assessment/Plan     Row Name 04/13/22 1300          PT Assessment    Functional Limitations Limitation in home management;Limitations in functional capacity and performance;Performance in self-care ADL  -AC     Impairments Muscle  strength;Pain;Joint mobility;Posture  -AC     Assessment Comments The pt is a 42 y/o female who presents today with L shoulder pain. She has full AROM with increased pain noted with IR and flexion. She has palpation tenderness along the L pecs, upper trap, biceps/biceital groove, and rhomboids/middle trap. She demos LUE weakness with MMT greatest in flexion, abduction, and IR. She does have positive special testing with anterior apprehension test for instability, no true sulcus sign however long axis distraction increases pain and is hypermobile and 0 deg abduction. She will benefit from skilled PT to improve overall shoulder stability/strength, decrease pain, and return to PLOF.  -AC     Rehab Potential Good  -AC     Patient/caregiver participated in establishment of treatment plan and goals Yes  -AC     Patient would benefit from skilled therapy intervention Yes  -AC            PT Plan    PT Frequency 1x/week;2x/week  -AC     Predicted Duration of Therapy Intervention (PT) 4-6 weeks  -AC     PT Plan Comments 1x/week to start due to pts work schedule, can increase to 2x/week if needed as pt is able. Manual trigger point release for cervical, shoulder, pecs, and biceps musculature as tolerated. global shoulder stability/strengthening. modalities as needed for pain.  -AC           User Key  (r) = Recorded By, (t) = Taken By, (c) = Cosigned By    Initials Name Provider Type    Gisela Mcdonald, PT Physical Therapist                                    Outcome Measure Options: Quick DASH  Quick DASH  Open a tight or new jar.: Moderate Difficulty  Do heavy household chores (e.g., wash walls, wash floors): Mild Difficulty  Carry a shopping bag or briefcase: Mild Difficulty  Wash your back: No Difficulty  Use a knife to cut food: No Difficulty  Recreational activities in which you take some force or impact through your arm, should or hand (e.g. golf, hammering, tennis, etc.): Moderate Difficulty  During the past week,  to what extent has your arm, shoulder, or hand problem interfered with your normal social activites with family, friends, neighbors or groups?: Slightly  During the past week, were you limited in your work or other regular daily activities as a result of your arm, shoulder or hand problem?: Slightly Limited  Arm, Shoulder, or hand pain: Moderate  Tingling (pins and needles) in your arm, shoulder, or hand: Moderate  During the past week, how much difficulty have you had sleeping because of the pain in your arm, shoulder or hand?: Mild Difficulty  Number of Questions Answered: 11  Quick DASH Score: 29.55         Time Calculation:     Start Time: 1310  Stop Time: 1344  Time Calculation (min): 34 min  Untimed Charges  PT Eval/Re-eval Minutes: 34  Total Minutes  Untimed Charges Total Minutes: 34   Total Minutes: 34     Therapy Charges for Today     Code Description Service Date Service Provider Modifiers Qty    76395268577 HC PT EVAL LOW COMPLEXITY 3 4/13/2022 Giseal Dimas, PT GP 1                   Gisela Dimas, PT  4/13/2022

## 2022-04-18 ENCOUNTER — OFFICE VISIT (OUTPATIENT)
Dept: ORTHOPEDIC SURGERY | Facility: CLINIC | Age: 42
End: 2022-04-18

## 2022-04-18 VITALS — HEIGHT: 67 IN | BODY MASS INDEX: 41.59 KG/M2 | WEIGHT: 265 LBS

## 2022-04-18 DIAGNOSIS — S43.432A TEAR OF LEFT GLENOID LABRUM, INITIAL ENCOUNTER: Primary | ICD-10-CM

## 2022-04-18 DIAGNOSIS — M25.512 LEFT SHOULDER PAIN, UNSPECIFIED CHRONICITY: ICD-10-CM

## 2022-04-18 PROCEDURE — 99214 OFFICE O/P EST MOD 30 MIN: CPT | Performed by: NURSE PRACTITIONER

## 2022-04-18 NOTE — PROGRESS NOTES
"Maria E Red is a 41 y.o. female      Chief Complaint   Patient presents with   • Left Shoulder - Follow-up       HISTORY OF PRESENT ILLNESS:    Patient is a 41-year-old female who presents today to review MRI results.  Patient has been seen in this office previously by Dr. Avery.   At last appointment patient reported pain in her left shoulder that radiates into neck.  She has seen a chiropractor in the past which told her her shoulder \"was out of place and needed to be put back in place\".  She reports two vague incidences where it felt as if shoulder may have popped out and back in.  She does not have any complaints of trauma.  She reports NSAIDs have been somewhat effective in controlling symptoms.  She has started physical therapy, she has been to one visit.               CONCURRENT MEDICAL HISTORY:    The following portions of the patient's history were reviewed and updated as appropriate: allergies, current medications, past family history, past medical history, past social history, past surgical history and problem list.     ROS  No fevers or chills.  No chest pain or shortness of air.  No GI or  disturbances.  Left shoulder pain.    PHYSICAL EXAMINATION:       Ht 170.2 cm (67\")   Wt 120 kg (265 lb)   BMI 41.50 kg/m²     Physical Exam  Vitals and nursing note reviewed.   Constitutional:       General: She is not in acute distress.     Appearance: She is well-developed. She is not toxic-appearing.   HENT:      Head: Normocephalic.   Pulmonary:      Effort: Pulmonary effort is normal. No respiratory distress.   Skin:     General: Skin is warm and dry.   Neurological:      Mental Status: She is alert and oriented to person, place, and time.   Psychiatric:         Behavior: Behavior normal.         Thought Content: Thought content normal.         Judgment: Judgment normal.         GAIT:     []  Normal  []  Antalgic    Assistive device: [x]  None  []  Walker     []  Crutches  []  Cane     []  " Wheelchair  []  Stretcher    Left Shoulder Exam     Range of Motion   Active abduction: 110   Forward flexion: 170     Other   Erythema: absent  Sensation: normal  Pulse: present     Comments:  Neurovascular intact              XR Shoulder 2+ View Left    Result Date: 4/8/2022  Narrative: Left shoulder three views April 6, 2022 INDICATION: Pain FINDINGS: Bones normally mineralized. No fracture or dislocation. No focal bony lesions. No significant soft tissue abnormality.     Impression: Grossly normal plain films left shoulder. Electronically signed by:  Wojciech Ocasio MD  4/8/2022 4:31 AM CDT Workstation: [id number]    MRI Shoulder Left Without Contrast    Result Date: 4/13/2022  Narrative: EXAM DESCRIPTION: MRI SHOULDER LEFT WO CONTRAST CLINICAL HISTORY: 41 years  Female  h/o shoulder instability, M25.512 Pain in left shoulder TECHNIQUE: Multiplanar, multisequential images of the left shoulder were acquired without the administration of IV contrast. COMPARISON: Radiographs dated 4/6/2022 FINDINGS: The rotator cuff is intact without evidence of tear or degeneration. There is no disproportionate muscle atrophy or edema. The acromioclavicular joint is normal. The undersurface of the acromion is flat and there is no subacromial enthesophyte. There is no subacromial/subdeltoid bursitis. There is a focal posterosuperior labral tear with a 5 mm posterior para labral cyst. Otherwise intact glenoid labrum. No glenohumeral joint effusion. There is no abnormal signal in the bone marrow.     Impression: Focal posterosuperior labral tear with a 5 mm posterior para labral cyst. No other abnormalities visualized in the left shoulder. Electronically signed by:  Kalpana Garcia MD  4/13/2022 10:48 PM CDT Workstation: [id number]            ASSESSMENT:    Diagnoses and all orders for this visit:    Tear of left glenoid labrum, initial encounter    Left shoulder pain, unspecified chronicity          PLAN    MRI results reviewed with  patient.  Patient has a focal posterosuperior labral tear, otherwise negative shoulder.  After discussing available treatment options, patient desires to proceed with continued use of NSAIDs and rice therapy as well as more visits of physical therapy.  Patient is to return in approximately 4 to 6 weeks for recheck with Dr. Avery.     Return in about 4 weeks (around 5/16/2022).      EMR Dragon/Transciption Disclaimer: Some of this note may be an electronic transcription/translation of spoken language to printed text.  The electronic translation of spoken language may permit erroneous, or at times, nonsensical words or phrases to be inadvertently transcribed. Although I have reviewed the note for such errors, some may still exist.       This document has been electronically signed by KIERRA Garcia on April 18, 2022 16:43 CDT

## 2022-04-20 ENCOUNTER — TELEPHONE (OUTPATIENT)
Dept: ENDOCRINOLOGY | Facility: CLINIC | Age: 42
End: 2022-04-20

## 2022-04-20 ENCOUNTER — HOSPITAL ENCOUNTER (OUTPATIENT)
Dept: PHYSICAL THERAPY | Facility: HOSPITAL | Age: 42
Setting detail: THERAPIES SERIES
Discharge: HOME | End: 2022-04-20

## 2022-04-20 DIAGNOSIS — M25.512 LEFT SHOULDER PAIN, UNSPECIFIED CHRONICITY: Primary | ICD-10-CM

## 2022-04-20 DIAGNOSIS — M25.312 SHOULDER INSTABILITY, LEFT: ICD-10-CM

## 2022-04-20 PROCEDURE — 97140 MANUAL THERAPY 1/> REGIONS: CPT

## 2022-04-20 PROCEDURE — 97110 THERAPEUTIC EXERCISES: CPT

## 2022-04-20 PROCEDURE — G0283 ELEC STIM OTHER THAN WOUND: HCPCS

## 2022-04-20 NOTE — THERAPY TREATMENT NOTE
Outpatient Physical Therapy Ortho Treatment Note  [facility]     Patient Name: Maria E Red  : 1980  MRN: [id number]  Today's Date: 2022      Visit Date: 2022     ATTENDANCE:   SUBJECTIVE IMPROVEMENT: not assessed  NEXT MD APPOINTMENT: 22  RECERT DATE: 22    THERAPY DIAGNOSIS: L shoulder pain/instability       Visit Dx:    ICD-10-CM ICD-9-CM   1. Left shoulder pain, unspecified chronicity  M25.512 719.41   2. Shoulder instability, left  M25.312 718.81       Patient Active Problem List   Diagnosis   • Anxiety   • Mood disorder ([facility])   • Major depressive disorder with current active episode   • GERD (gastroesophageal reflux disease)   • Other insomnia   • Back muscle spasm   • Onychomycosis of toenail   • Dysplasia of cervix, high grade MARILEE 2   • Dysplasia of cervix, high grade MARILEE 2   • Vitamin D deficiency   • Low serum vitamin B12   • Generalized anxiety disorder   • Goiter   • Snoring   • Class 3 severe obesity due to excess calories with body mass index (BMI) of 40.0 to 44.9 in adult ([facility])   • Hypertensive disorder   • Left shoulder pain        Past Medical History:   Diagnosis Date   • Anxiety    • Anxiety and depression    • Arthritis    • Atypical squamous cells of undetermined significance (ASC-US) on cervical Pap smear    • Back pain     back spasms and leg spasms   • Backache    • Bronchitis    • Constipation    • Depression    • Dysplasia of cervix     stage 2   • Encounter for  visit    • Encounter for routine gynecological examination    • Epigastric pain    • Exanthematous disorder    • GERD (gastroesophageal reflux disease)    • HPV (human papilloma virus) infection    • Hypertensive disorder     resolved (with pregnancy)   • IBS (irritable bowel syndrome)    • Low back pain    • Migraine    • Obesity    • Pap smear abnormality of cervix     grade 2 dysplagia   • PCOS (polycystic ovarian syndrome)    • Polycystic ovaries    • Psoriasis    •  "Psychogenic pruritus    • Surgery follow-up    • Wears glasses         Past Surgical History:   Procedure Laterality Date   •  SECTION  2012    primary   • CHOLECYSTECTOMY     • COLONOSCOPY     • ENDOSCOPY     • FRACTURE SURGERY      boxers fracture-2016   • LEEP N/A 2018    Procedure: LOOP ELECTROCAUTERY EXCISION PROCEDURE;  Surgeon: Adrienne Diego MD;  Location: [facility];  Service: Obstetrics/Gynecology        PT Ortho     Row Name 22 0900       Subjective Comments    Subjective Comments Pt notes that arm feels tired this morning. has been doing some housework with laundry, dishes, and cooking yesterday and this morning. She notes that someone bumped her shoulder yesterday and that was very painful. HEP stretches do feel good. Follow-up with MD was on 22 and MRI reviewed. Copied from imaging report: \"Focal posterosuperior labral tear with a 5 mm posterior para labral cyst. No other abnormalities visualized in the left shoulder.\" notes they suggested continued PT for 4-6 weeks then return to follow-up with MD 22  -AC       Precautions and Contraindications    Precautions posterosuperior labral tear 5mm  -AC       Subjective Pain    Able to rate subjective pain? yes  -AC          User Key  (r) = Recorded By, (t) = Taken By, (c) = Cosigned By    Initials Name Provider Type    Gisela Mcdonald, PT Physical Therapist                             PT Assessment/Plan     Row Name 22 0900          PT Assessment    Assessment Comments treatment tolerated well today. pt will multiple trigger points identified throughout posterior shoulder musculature. She has occasional upper trap compensations with scap retractions however able to correct with verbal cuing. good form with all HEP stretching reviewed today. added scap retraction and no- money to HEP. extim+ice for pain control with good tolerance today.  -AC            PT Plan    PT Frequency 1x/week;2x/week  -AC     Predicted " "Duration of Therapy Intervention (PT) 4-6 weeks  -AC     PT Plan Comments manual to pec and biceps next. continue to progress scap strength as tolerated. begin very gentle with AROM or very light resistance  -AC           User Key  (r) = Recorded By, (t) = Taken By, (c) = Cosigned By    Initials Name Provider Type    AC Gisela Dimas, PT Physical Therapist                 Modalities     Row Name 04/20/22 0900             Ice    Ice Applied Yes  -AC      Location L shoulder  -AC      Ice S/P Rx Yes  -AC              ELECTRICAL STIMULATION    Attended/Unattended Unattended  -AC      Stimulation Type IFC  -AC      Location/Electrode Placement/Other L shoulder with ice  -AC      PT E-Stim Unattended Minutes 10  -AC            User Key  (r) = Recorded By, (t) = Taken By, (c) = Cosigned By    Initials Name Provider Type    AC Gisela Dimas, PT Physical Therapist               OP Exercises     Row Name 04/20/22 0900             Subjective Comments    Subjective Comments Pt notes that arm feels tired this morning. has been doing some housework with laundry, dishes, and cooking yesterday and this morning. She notes that someone bumped her shoulder yesterday and that was very painful. HEP stretches do feel good. Follow-up with MD was on 4/18/22 and MRI reviewed. Copied from imaging report: \"Focal posterosuperior labral tear with a 5 mm posterior para labral cyst. No other abnormalities visualized in the left shoulder.\" notes they suggested continued PT for 4-6 weeks then return to follow-up with MD 5/17/22  -AC              Subjective Pain    Able to rate subjective pain? yes  -AC      Pre-Treatment Pain Level 3  -AC      Post-Treatment Pain Level 0  no pain, just tight still  -AC              Total Minutes    98547 - PT Therapeutic Exercise Minutes 38  -AC      87074 - PT Manual Therapy Minutes 15  -AC              Exercise 1    Exercise Name 1 Pro II- L2  -AC      Time 1 4 min fdw/3:30 back  -AC      Additional " Comments pt d/c due to pain beginning to radiate down to elbow  -AC              Exercise 2    Exercise Name 2 upper trap stretch  -AC      Sets 2 3  -AC      Time 2 30s  -AC              Exercise 3    Exercise Name 3 levator scap stretch  -AC      Sets 3 3  -AC      Time 3 30s  -AC              Exercise 4    Exercise Name 4 pec stretch  -AC      Sets 4 3  -AC      Time 4 30s  -AC              Exercise 5    Exercise Name 5 doorway bicep stretch  -AC      Sets 5 3  -AC      Reps 5 --  -AC      Time 5 30s  -AC              Exercise 6    Exercise Name 6 tball shoulder flexion stretch  -AC      Sets 6 1  -AC      Reps 6 10  -AC      Time 6 10s hold  -AC              Exercise 7    Exercise Name 7 scap retractions  -AC      Sets 7 1  -AC      Reps 7 20  -AC      Time 7 5s hold  -AC              Exercise 8    Exercise Name 8 no money with no resistance  -AC      Sets 8 1  -AC      Reps 8 20  -AC      Additional Comments pain with LUE ER past ~45. pt educated to allow pain as guide with limiting motion  -AC              Exercise 9    Exercise Name 9 see manual  -AC      Time 9 15  -AC              Exercise 10    Exercise Name 10 see modalities  -AC      Time 10 10 min  -AC            User Key  (r) = Recorded By, (t) = Taken By, (c) = Cosigned By    Initials Name Provider Type    AC Gisela Dimas, PT Physical Therapist                         Manual Rx (last 36 hours)     Manual Treatments     Row Name 04/20/22 0900             Total Minutes    09292 - PT Manual Therapy Minutes 15  -AC              Manual Rx 1    Manual Rx 1 Location L middle/lower traps, rhomboids, suprascap, infrascap, teres major, proximal lats.  -AC      Manual Rx 1 Type manual trigger point release  -AC      Manual Rx 1 Duration 15 min  -AC            User Key  (r) = Recorded By, (t) = Taken By, (c) = Cosigned By    Initials Name Provider Type    AC Gisela Dimas, PT Physical Therapist                 PT OP Goals     Row Name 04/20/22 0900           PT Short Term Goals    STG Date to Achieve 05/11/22  -AC     STG 1 Pt is indpt with HEP.  -AC     STG 1 Progress Ongoing  -AC     STG 2 Pt is able to complete AROM L shoulder without increased pain.  -AC     STG 2 Progress Ongoing  -AC            Long Term Goals    LTG Date to Achieve 05/25/22  -AC     LTG 1 Pt demos improved L shoulder MMT to 4+/5 or better in all planes with minimal pain increase.  -AC     LTG 1 Progress Ongoing  -AC     LTG 2 Pt demos minimal palpation tenderness along pecs, biceps, and middle trap/rhomboids.  -AC     LTG 2 Progress Ongoing  -AC     LTG 3 QuickDASH improved to </= 10.  -AC     LTG 3 Progress Ongoing  -AC     LTG 4 Pt notes overall subjecective improvement 80% or better.  -AC     LTG 4 Progress Ongoing  -AC     LTG 5 Pt is able to care for and  daughter without increased L shoulder pain.  -AC     LTG 5 Progress Ongoing  -AC            Time Calculation    PT Goal Re-Cert Due Date 05/04/22  -AC           User Key  (r) = Recorded By, (t) = Taken By, (c) = Cosigned By    Initials Name Provider Type    AC Gisela Dimas, PT Physical Therapist                               Time Calculation:   Start Time: 0912  Stop Time: 1015  Time Calculation (min): 63 min  Timed Charges  70632 - PT Therapeutic Exercise Minutes: 38  37980 - PT Manual Therapy Minutes: 15  Untimed Charges  PT E-Stim Unattended Minutes: 10  Total Minutes  Timed Charges Total Minutes: 53  Untimed Charges Total Minutes: 10   Total Minutes: 63  Therapy Charges for Today     Code Description Service Date Service Provider Modifiers Qty    59403575780 HC PT THER PROC EA 15 MIN 4/20/2022 Gisela Dimas, PT GP 2    47010459281 HC PT MANUAL THERAPY EA 15 MIN 4/20/2022 Gisela Dimas, PT GP 1    75039011941 HC PT ELECTRICAL STIM UNATTENDED 4/20/2022 Gisela Dimas, PT  1                    Gisela Dimas, PT  4/20/2022

## 2022-04-21 ENCOUNTER — LAB (OUTPATIENT)
Dept: LAB | Facility: HOSPITAL | Age: 42
End: 2022-04-21

## 2022-04-21 ENCOUNTER — OFFICE VISIT (OUTPATIENT)
Dept: ENDOCRINOLOGY | Facility: CLINIC | Age: 42
End: 2022-04-21

## 2022-04-21 ENCOUNTER — SPECIALTY PHARMACY (OUTPATIENT)
Dept: ENDOCRINOLOGY | Facility: CLINIC | Age: 42
End: 2022-04-21

## 2022-04-21 VITALS
HEART RATE: 72 BPM | BODY MASS INDEX: 41.91 KG/M2 | OXYGEN SATURATION: 100 % | HEIGHT: 67 IN | WEIGHT: 267 LBS | DIASTOLIC BLOOD PRESSURE: 84 MMHG | SYSTOLIC BLOOD PRESSURE: 114 MMHG

## 2022-04-21 DIAGNOSIS — E16.1 HYPERINSULINEMIA: ICD-10-CM

## 2022-04-21 DIAGNOSIS — E04.9 GOITER: ICD-10-CM

## 2022-04-21 DIAGNOSIS — E55.9 VITAMIN D DEFICIENCY: ICD-10-CM

## 2022-04-21 DIAGNOSIS — E28.2 PCOS (POLYCYSTIC OVARIAN SYNDROME): Primary | ICD-10-CM

## 2022-04-21 LAB
25(OH)D3 SERPL-MCNC: 18.6 NG/ML (ref 30–100)
ALBUMIN SERPL-MCNC: 4.2 G/DL (ref 3.5–5.2)
ALBUMIN/GLOB SERPL: 1.5 G/DL
ALP SERPL-CCNC: 77 U/L (ref 39–117)
ALT SERPL W P-5'-P-CCNC: 25 U/L (ref 1–33)
ANION GAP SERPL CALCULATED.3IONS-SCNC: 15 MMOL/L (ref 5–15)
AST SERPL-CCNC: 18 U/L (ref 1–32)
BASOPHILS # BLD AUTO: 0.03 10*3/MM3 (ref 0–0.2)
BASOPHILS NFR BLD AUTO: 0.4 % (ref 0–1.5)
BILIRUB SERPL-MCNC: 0.6 MG/DL (ref 0–1.2)
BUN SERPL-MCNC: 11 MG/DL (ref 6–20)
BUN/CREAT SERPL: 14.3 (ref 7–25)
CALCIUM SPEC-SCNC: 8.4 MG/DL (ref 8.6–10.5)
CHLORIDE SERPL-SCNC: 101 MMOL/L (ref 98–107)
CO2 SERPL-SCNC: 23 MMOL/L (ref 22–29)
CREAT SERPL-MCNC: 0.77 MG/DL (ref 0.57–1)
DEPRECATED RDW RBC AUTO: 45.7 FL (ref 37–54)
EGFRCR SERPLBLD CKD-EPI 2021: 100.2 ML/MIN/1.73
EOSINOPHIL # BLD AUTO: 0.05 10*3/MM3 (ref 0–0.4)
EOSINOPHIL NFR BLD AUTO: 0.7 % (ref 0.3–6.2)
ERYTHROCYTE [DISTWIDTH] IN BLOOD BY AUTOMATED COUNT: 13.5 % (ref 12.3–15.4)
ESTRADIOL SERPL HS-MCNC: 44.6 PG/ML
GLOBULIN UR ELPH-MCNC: 2.8 GM/DL
GLUCOSE SERPL-MCNC: 89 MG/DL (ref 65–99)
HBA1C MFR BLD: 5.6 % (ref 4.8–5.6)
HCT VFR BLD AUTO: 41.2 % (ref 34–46.6)
HGB BLD-MCNC: 14.1 G/DL (ref 12–15.9)
IMM GRANULOCYTES # BLD AUTO: 0.03 10*3/MM3 (ref 0–0.05)
IMM GRANULOCYTES NFR BLD AUTO: 0.4 % (ref 0–0.5)
LYMPHOCYTES # BLD AUTO: 3.5 10*3/MM3 (ref 0.7–3.1)
LYMPHOCYTES NFR BLD AUTO: 45.6 % (ref 19.6–45.3)
MCH RBC QN AUTO: 31.5 PG (ref 26.6–33)
MCHC RBC AUTO-ENTMCNC: 34.2 G/DL (ref 31.5–35.7)
MCV RBC AUTO: 92.2 FL (ref 79–97)
MONOCYTES # BLD AUTO: 0.33 10*3/MM3 (ref 0.1–0.9)
MONOCYTES NFR BLD AUTO: 4.3 % (ref 5–12)
NEUTROPHILS NFR BLD AUTO: 3.74 10*3/MM3 (ref 1.7–7)
NEUTROPHILS NFR BLD AUTO: 48.6 % (ref 42.7–76)
NRBC BLD AUTO-RTO: 0 /100 WBC (ref 0–0.2)
PLATELET # BLD AUTO: 292 10*3/MM3 (ref 140–450)
PMV BLD AUTO: 9.7 FL (ref 6–12)
POTASSIUM SERPL-SCNC: 3.8 MMOL/L (ref 3.5–5.2)
PROGEST SERPL-MCNC: <0.05 NG/ML
PROT SERPL-MCNC: 7 G/DL (ref 6–8.5)
RBC # BLD AUTO: 4.47 10*6/MM3 (ref 3.77–5.28)
SODIUM SERPL-SCNC: 139 MMOL/L (ref 136–145)
TSH SERPL DL<=0.05 MIU/L-ACNC: 2.32 UIU/ML (ref 0.27–4.2)
VIT B12 BLD-MCNC: 293 PG/ML (ref 211–946)
WBC NRBC COR # BLD: 7.68 10*3/MM3 (ref 3.4–10.8)

## 2022-04-21 PROCEDURE — 80050 GENERAL HEALTH PANEL: CPT | Performed by: INTERNAL MEDICINE

## 2022-04-21 PROCEDURE — 83036 HEMOGLOBIN GLYCOSYLATED A1C: CPT | Performed by: INTERNAL MEDICINE

## 2022-04-21 PROCEDURE — 36415 COLL VENOUS BLD VENIPUNCTURE: CPT | Performed by: INTERNAL MEDICINE

## 2022-04-21 PROCEDURE — 99214 OFFICE O/P EST MOD 30 MIN: CPT | Performed by: INTERNAL MEDICINE

## 2022-04-21 PROCEDURE — 82607 VITAMIN B-12: CPT | Performed by: INTERNAL MEDICINE

## 2022-04-21 PROCEDURE — 82306 VITAMIN D 25 HYDROXY: CPT | Performed by: INTERNAL MEDICINE

## 2022-04-21 PROCEDURE — 84144 ASSAY OF PROGESTERONE: CPT | Performed by: INTERNAL MEDICINE

## 2022-04-21 PROCEDURE — 82670 ASSAY OF TOTAL ESTRADIOL: CPT | Performed by: INTERNAL MEDICINE

## 2022-04-21 PROCEDURE — 84403 ASSAY OF TOTAL TESTOSTERONE: CPT | Performed by: INTERNAL MEDICINE

## 2022-04-21 PROCEDURE — 83525 ASSAY OF INSULIN: CPT | Performed by: INTERNAL MEDICINE

## 2022-04-21 RX ORDER — SEMAGLUTIDE 1.34 MG/ML
1 INJECTION, SOLUTION SUBCUTANEOUS WEEKLY
Qty: 9 ML | Refills: 3 | Status: SHIPPED | OUTPATIENT
Start: 2022-04-21 | End: 2022-12-21

## 2022-04-21 RX ORDER — METFORMIN HYDROCHLORIDE 500 MG/1
TABLET, EXTENDED RELEASE ORAL
Qty: 360 TABLET | Refills: 3 | Status: SHIPPED | OUTPATIENT
Start: 2022-04-21

## 2022-04-21 NOTE — PROGRESS NOTES
"Chief Complaint  Polycystic Ovary Syndrome (F/u )    Subjective          Maria E Red presents to [facility] for   History of Present Illness    Very pleasant 41-year-old female who I met with for the first time in 2021 for polycystic ovarian syndrome and goiter.    Diagnosis of polycystic ovarian syndrome is well-established by imaging documenting typical appearance of ovarian cysts, hirsutism affecting the chin and upper lip area evidence on physical exam and testosterone level in the upper limit of normal at 40s, and oligomenorrhea.  We also ruled out Cushing's by dexamethasone suppression test and congenital adrenal hyperplasia    In 2021 we initiated Ozempic and metformin and since then she has had regular menstrual cycles and reduction in hirsutism.  She has also lost weight  She is not on OCP due to side effects and was using intermittent progesterone but now not needed    Pertaining goiter, ultrasound was done in 2021 and the right lobe measures 5.3 cm while the left lobe measures 4.7.  There are no nodules, thyroid architecture is homogeneous, TPO antibodies are negative, TSH is normal    Objective   Vital Signs:   /84 (BP Location: Left arm, Patient Position: Sitting)   Pulse 72   Ht 170.2 cm (67\")   Wt 121 kg (267 lb)   SpO2 100%   BMI 41.82 kg/m²     Physical Exam   Patient is alert oriented and very pleasant    Normocephalic    Neck no palpable goiter, no thyroid nodules    There is appreciable recently shaved terminal hair in the chin area    Cardiovascular normal rhythm and rate    Normal respiratory effort normal breath sounds    Abdomen with thin striae    Skin, hirsutism appreciated in chin and upper lip recently shaven    No cushingoid features    Result Review :   The following data was reviewed by: Clinton Montes De Oca MD on 02/02/2021:  Common labs    Common Labsle 8/19/21 8/19/21    1258 1258   Glucose  78   BUN  11 "   Creatinine  0.68   eGFR Non African Am  96   Sodium  137   Potassium  4.1   Chloride  102   Calcium  9.2   Albumin  4.30   Total Bilirubin  0.6   Alkaline Phosphatase  76   AST (SGOT)  31   ALT (SGPT)  39 (A)   WBC 8.07    Hemoglobin 13.1    Hematocrit 39.4    Platelets 269    (A) Abnormal value                Thyroid Workup    Lab Results   Component Value Date    TSH 3.390 11/02/2020    TSH 2.750 06/10/2019       Lab Results   Component Value Date    FREET4 1.25 11/02/2020    FREET4 1.14 06/10/2019       No results found for: T3FREE    TPO antibodies    Lab Results   Component Value Date    THYROIDAB <9 02/02/2021       TSI antibodies    No results found for: THYRSTIMIMMU    --------------------------    Lab Results   Component Value Date    BTCEJWJB95 388 11/02/2020       ---------------------------    Lab Results   Component Value Date    BZHY02OQ 26.2 (L) 11/02/2020    FOJC22FX 19.2 (L) 06/10/2019                            Assessment and Plan    Problem List Items Addressed This Visit        Other    Vitamin D deficiency    Relevant Orders    CBC Auto Differential    Comprehensive Metabolic Panel    Hemoglobin A1c    Insulin, Total    TSH    Vitamin B12    Vitamin D 25 Hydroxy    Testosterone, Total, Women, Children, and Hypogonadal Males    Estradiol    Progesterone    Goiter    Relevant Orders    CBC Auto Differential    Comprehensive Metabolic Panel    Hemoglobin A1c    Insulin, Total    TSH    Vitamin B12    Vitamin D 25 Hydroxy    Testosterone, Total, Women, Children, and Hypogonadal Males    Estradiol    Progesterone      Other Visit Diagnoses     PCOS (polycystic ovarian syndrome)    -  Primary    Relevant Orders    CBC Auto Differential    Comprehensive Metabolic Panel    Hemoglobin A1c    Insulin, Total    TSH    Vitamin B12    Vitamin D 25 Hydroxy    Testosterone, Total, Women, Children, and Hypogonadal Males    Estradiol    Progesterone    Hyperinsulinemia        Relevant Orders    CBC Auto  Differential    Comprehensive Metabolic Panel    Hemoglobin A1c    Insulin, Total    TSH    Vitamin B12    Vitamin D 25 Hydroxy    Testosterone, Total, Women, Children, and Hypogonadal Males    Estradiol    Progesterone      Perceived goiter    Mainly reassurance, slight deviation from upper limit of normal of right lobe but otherwise thyroid ultrasound completely normal and normal TSH.    TPO ab negative     Reassurance    =======    PCOS    Hyperinsulinemia - Type 2 Diabetes    Start metformin 500 mg w supper maximized to 2 g a day  Ozempic 0.25 mg w x 4 w then 0.5 mg w, increased to 1 mg and potentially increase to 2      She has side effects to oral contraceptives so we can use intermittent progesterone. I would be hesitant to use Aldactone in the absence of a contraceptive method    Now having regular menstrual cycles, cautioned that fertility has increased    =======      Obesity    Responding well to Ozempic    =======    Obstructive sleep apnea suspicion    Referred to sleep medicine  Ruled out sleep apnea    ==========    Vitamin D deficiency on over-the-counter supplements    =  Follow Up   Return in about 1 year (around 4/21/2023).  Patient was given instructions and counseling regarding her condition or for health maintenance advice. Please see specific information pulled into the AVS if appropriate.

## 2022-04-21 NOTE — PROGRESS NOTES
Specialty Pharmacy Program - Endocrinology       Maria E Red is a 41 y.o. female with Type 2 Diabetes enrolled in the Endocrinology Patient Management program offered by [facility].  An initial outreach was conducted, including assessment of therapy appropriateness and specialty medication education for Ozempic. The patient was introduced to services offered by our specialty pharmacy program, including: regular assessments, refill coordination, curbside pick-up or mail order delivery options, prior authorization maintenance, and financial assistance programs as applicable. The patient was also provided with contact information for the pharmacy team.     Insurance Coverage & Financial Support  commercial through [facility]    Relevant Past Medical History and Comorbidities  Relevant medical history and concomitant health conditions were discussed with the patient. The patient's chart has been reviewed for relevant past medical history and comorbid health conditions and updated as necessary.   Past Medical History:   Diagnosis Date   • Anxiety    • Anxiety and depression    • Arthritis    • Atypical squamous cells of undetermined significance (ASC-US) on cervical Pap smear    • Back pain     back spasms and leg spasms   • Backache    • Bronchitis    • Constipation    • Depression    • Dysplasia of cervix     stage 2   • Encounter for  visit    • Encounter for routine gynecological examination    • Epigastric pain    • Exanthematous disorder    • GERD (gastroesophageal reflux disease)    • HPV (human papilloma virus) infection    • Hypertensive disorder     resolved (with pregnancy)   • IBS (irritable bowel syndrome)    • Low back pain    • Migraine    • Obesity    • Pap smear abnormality of cervix     grade 2 dysplagia   • PCOS (polycystic ovarian syndrome)    • Polycystic ovaries    • Psoriasis    • Psychogenic pruritus    • Surgery follow-up    • Wears glasses      Social  History     Socioeconomic History   • Marital status:    Tobacco Use   • Smoking status: Former Smoker     Quit date: 10/1/2016     Years since quittin.5   • Smokeless tobacco: Never Used   Substance and Sexual Activity   • Alcohol use: Yes     Comment: occational    • Drug use: No   • Sexual activity: Yes     Partners: Male       Problem list reviewed by Otis Santamaria, PharmD on 2022 at  1:58 PM    Allergies  Known allergies and reactions were discussed with the patient. The patient's chart has been reviewed for  allergy information and updated as necessary.   Codeine and Dilaudid [hydromorphone hcl]    Allergies reviewed by Otis Santamaria, PharmD on 2022 at  1:58 PM    Current Medication List  This medication list has been reviewed with the patient and evaluated for any interactions or necessary modifications/recommendations, and updated to include all prescription medications, OTC medications, and supplements the patient is currently taking.  This list reflects what is contained in the patient's profile, which has also been marked as reviewed to communicate to other providers it is the most up to date version of the patient's current medication therapy.     Current Outpatient Medications:   •  cetirizine (zyrTEC) 10 MG tablet, Take 10 mg by mouth Every Night., Disp: , Rfl:   •  clobetasol (TEMOVATE) 0.05 % external solution, Apply topically to the appropriate area of scalp as directed 2 (two) times a day., Disp: 50 mL, Rfl: 11  •  clobetasol (TEMOVATE) 0.05 % ointment, Apply to the appropriate area on the body as directed 2 (two) times daily. Limit duration to 2 week periods. Do NOT apply to face, groin, or folds., Disp: 60 g, Rfl: 5  •  desvenlafaxine (Pristiq) 50 MG 24 hr tablet, Take 1 tablet by mouth Daily., Disp: 30 tablet, Rfl: 0  •  melatonin 3 MG tablet, Take 1.5 mg by mouth every night at bedtime., Disp: , Rfl:   •  metFORMIN ER (Glucophage XR) 500 MG 24 hr tablet, Take 2  tablets by mouth twice daily with meals., Disp: 360 tablet, Rfl: 3  •  Multiple Vitamins-Minerals (MULTIVITAMIN ADULT PO), Take 1 tablet by mouth Daily., Disp: , Rfl:   •  omeprazole (priLOSEC) 20 MG capsule, Take 1 capsule by mouth Every Night., Disp: 90 capsule, Rfl: 1  •  Semaglutide, 1 MG/DOSE, (Ozempic, 1 MG/DOSE,) 4 MG/3ML solution pen-injector, Inject 1 mg under the skin into the appropriate area as directed 1 (One) Time Per Week., Disp: 9 mL, Rfl: 3  •  vitamin D3 125 MCG (5000 UT) capsule capsule, Take 5,000 Units by mouth Daily., Disp: , Rfl:     Medicines reviewed by Otis Santamaria, PharmD on 4/21/2022 at  1:58 PM    Drug Interactions  None      Relevant Laboratory Values  A1C Last 3 Results    HGBA1C Last 3 Results 4/21/22   Hemoglobin A1C 5.60           Lab Results   Component Value Date    HGBA1C 5.60 04/21/2022     Lab Results   Component Value Date    GLUCOSE 89 04/21/2022    CALCIUM 8.4 (L) 04/21/2022     04/21/2022    K 3.8 04/21/2022    CO2 23.0 04/21/2022     04/21/2022    BUN 11 04/21/2022    CREATININE 0.77 04/21/2022    EGFRIFNONA 96 08/19/2021    BCR 14.3 04/21/2022    ANIONGAP 15.0 04/21/2022     Lab Results   Component Value Date    CHOL 151 11/02/2020    TRIG 72 11/02/2020    HDL 34 (L) 11/02/2020     (H) 11/02/2020         Initial Education Provided for Specialty Medication  The patient has been provided with the following education and any applicable administration techniques (i.e. self-injection) have been demonstrated for the therapies indicated. All questions and concerns have been addressed prior to the patient receiving the medication, and the patient has verbalized understanding of the education and any materials provided.  Additional patient education shall be provided and documented upon request by the patient, provider or payer.      OZEMPIC® (semaglutide)  Medication Expectations   Why am I taking this medication? You are taking Ozempic, along with diet  and exercise, to lower blood sugar because you have type 2 diabetes. Diabetes is not curable but with proper medication and treatment, we can keep your blood sugar within your personalized target range. This medication may also help you lose some weight, and it helps reduce the risk of death from heart attack, and stroke in adults with type 2 diabetes and known heart disease.   What should I expect while on this medication? You should expect to see your blood sugar and A1c decrease over time and you may also lose some weight.   How does the medication work? Ozempic is a non-insulin injection that works with your body's own ability to lower blood sugar and A1c and helps your body release its own insulin in response to your blood sugar rising.  This medication also slows down food from leaving your stomach, making you feel payne for longer.   How long will I be on this medication for? The amount of time you will be on this medication will be determined by your doctor based on blood sugar and A1c control. You will most likely be on this medication or another diabetes medication throughout your lifetime. Do not abruptly stop this medication without talking to your doctor first.    How do I take this medication? Take as directed on your prescription label. Ozempic is injected under the skin (subcutaneously) of your stomach, thigh or upper arm.  Use this medication once weekly, on the same day each week, and it can be given with or without food.  Use a different injection site each week in the same body region.     For each new prefilled pen, prime the needle before the first injection by turning the dose selector to the flow check symbol and injecting into the air (priming is not required for subsequent injections).   • Once needle is inserted, continue to press the button until the dial has returned to 0 and for an additional 6 seconds before removing.  •   What are some possible side effects? You may notice you don't  feel as hungry, especially when you first start using Ozempic.  The most common side effects are nausea, diarrhea, vomiting, stomach pain, and constipation.      Stop using Ozempic and call your doctor immediately if you have severe pain in your stomach area that will not go away as this could be a sign of pancreatitis (inflammation of your pancreas).  Tell your doctor if you get a lump or swelling in your neck, hoarseness, difficulty swallowing, or feel short of breath (these may be symptoms of thyroid cancer).  Talk with your doctor if you have changes in vision while taking Ozempic.   What happens if I miss a dose? If you miss a dose, take it as soon as you remember as long as it is within 5 days after your missed dose.  If more than 5 days have passed, skip the missed dose and resume Ozempic on the regularly scheduled day.     Medication Safety   What are things I should warn my doctor immediately about? Do not use Ozempic if you or a family member have ever had medullary thyroid cancer (MTC) or Multiple Endocrine Neoplasia syndrome type 2 (MEN 2).    • Tell your doctor if you get a lump or swelling in your neck, hoarseness, difficulty swallowing, or feel short of breath (these may be symptoms of thyroid cancer).    Tell your doctor if you have or have had problems with your kidneys or pancreas.   • Stop using Ozempic and get medical help right away if you have severe pain in your stomach area that will not go away as this could be a sign of pancreatitis (inflammation of your pancreas)  Tell your doctor if you have problem digesting food or slowed emptying of your stomach (gastroparesis).    Let your doctor know if you have changes in vision while taking Ozempic or have been diagnosed with diabetic retinopathy.    Talk to your doctor if you are pregnant, planning to become pregnant, or breastfeeding.     Get medical help right away if you notice any signs/symptoms of an allergic reaction (rash, hives, difficulty  breathing, etc.).   What are things that I should be cautious of? Be cautious of any side effects from this medication. Talk to your doctor if any new ones develop or aren't getting better.   What are some medications that can interact with this one? Taking Ozempic with other medications that also lower your blood sugar such as insulin and glipizide/glimepiride/glyburide may increase the risk of low blood sugar.  • Your doctor may reduce the dose of these medications when you start Ozempic to minimize low blood sugars    It should not be taken with other medicines called GLP-1 receptor agonists, because these work the same way as Ozempic.      Because Ozempic slows stomach emptying, it can affect the way some medicines work.    Always tell your doctor or pharmacist immediately if you start taking any new medications, including over-the-counter medications, vitamins, and herbal supplements.     Medication Storage/Handling   How should I handle this medication? Keep this medication out of reach of pets/children and keep the pen capped when not in use.   How does this medication need to be stored? Store in the refrigerator prior to first use, but do not freeze.  After first use, you may continue to store in the refrigerator or at room temperature for 56 days.  Protect from excessive heat and sunlight.   How should I dispose of this medication? Used Ozempic pens should be thrown away after 56 days.  Place your used Ozempic pen and needle in an approved sharps container after use.  If you do not have a sharps container, you may use a household container made of heavy-duty plastic with a tight-fitting lid that is leak resistant (e.g., heavy-duty plastic laundry detergent bottle).    If your doctor decides to stop this medication, take to your local police station for proper disposal. Some pharmacies also have take-back bins for medication drop-off.      Resources/Support   How can I remind myself to take this medication?  You can download reminder apps to help you manage your refills. You may also set an alarm on your phone to remind you.    Is financial support available?  [employer] can provide co-pay cards if you have commercial insurance or patient assistance if you have Medicare or no insurance.    Which vaccines are recommended for me? Talk to your doctor about these vaccines:  • Flu   • Coronavirus (COVID-19)   • Pneumococcal (pneumonia)   • Tdap   • Hepatitis B   • Zoster (shingles)          Adherence and Self-Administration  • Barriers to Patient Adherence and/or Self-Administration: None   • Methods for Supporting Patient Adherence and/or Self-Administration: none     Goals of Therapy   Goals     •  Specialty Pharmacy General Goal (pt-stated)       Eventually tolerate 2 mg dose of ozempic             Reassessment Plan & Follow-Up  1. Medication Therapy Changes: continue ozempic with higher dose   2. Additional Plans, Therapy Recommendations, or Therapy Problems to Be Addressed: we will see if she can tolerate the 2 mg dose with next fill if things keep going well   3. Pharmacist to perform regular reassessments no more than (6) months from the previous assessment.  4. Welcome information and patient satisfaction survey to be sent by retail team with patient's initial fill.  5. Care Coordinator to set up future refill outreaches, coordinate prescription delivery, and escalate clinical questions to pharmacist.     Attestation  I attest that the initiated specialty medication(s) are appropriate for the patient based on my assessment.  If the prescribed therapy is at any point deemed not appropriate based on the current or future assessments, a consultation will be initiated with the patient's specialty care provider to determine the best course of action. The revised plan of therapy will be documented along with any additional patient education provided.     Otis Santamaria, MiryamD, MPH, BCPS    4/21/2022  13:59 CDT

## 2022-04-22 LAB — INSULIN SERPL-ACNC: 28.9 UIU/ML (ref 2.6–24.9)

## 2022-04-25 DIAGNOSIS — E55.9 VITAMIN D DEFICIENCY: ICD-10-CM

## 2022-04-25 DIAGNOSIS — E16.1 HYPERINSULINEMIA: ICD-10-CM

## 2022-04-25 DIAGNOSIS — E28.2 PCOS (POLYCYSTIC OVARIAN SYNDROME): Primary | ICD-10-CM

## 2022-04-27 ENCOUNTER — HOSPITAL ENCOUNTER (OUTPATIENT)
Dept: PHYSICAL THERAPY | Facility: HOSPITAL | Age: 42
Setting detail: THERAPIES SERIES
Discharge: HOME | End: 2022-04-27

## 2022-04-27 DIAGNOSIS — M25.512 LEFT SHOULDER PAIN, UNSPECIFIED CHRONICITY: Primary | ICD-10-CM

## 2022-04-27 DIAGNOSIS — M25.312 SHOULDER INSTABILITY, LEFT: ICD-10-CM

## 2022-04-27 PROCEDURE — G0283 ELEC STIM OTHER THAN WOUND: HCPCS

## 2022-04-27 PROCEDURE — 97140 MANUAL THERAPY 1/> REGIONS: CPT

## 2022-04-27 PROCEDURE — 97110 THERAPEUTIC EXERCISES: CPT

## 2022-04-27 NOTE — THERAPY TREATMENT NOTE
Outpatient Physical Therapy Ortho Treatment Note  [facility]     Patient Name: Maria E Red  : 1980  MRN: [id number]  Today's Date: 2022      Visit Date: 2022     Subjective Improvement: 0%  Attendance:  3/3 (57/yr)  Next MD Visit : 2022  Recert Date:  2022      Therapy Diagnosis:  L Shoulder pain/instability.         Visit Dx:    ICD-10-CM ICD-9-CM   1. Left shoulder pain, unspecified chronicity  M25.512 719.41   2. Shoulder instability, left  M25.312 718.81       Patient Active Problem List   Diagnosis   • Anxiety   • Mood disorder ([facility])   • Major depressive disorder with current active episode   • GERD (gastroesophageal reflux disease)   • Other insomnia   • Back muscle spasm   • Onychomycosis of toenail   • Dysplasia of cervix, high grade MARILEE 2   • Dysplasia of cervix, high grade MARIELE 2   • Vitamin D deficiency   • Low serum vitamin B12   • Generalized anxiety disorder   • Goiter   • Snoring   • Class 3 severe obesity due to excess calories with body mass index (BMI) of 40.0 to 44.9 in adult ([facility])   • Hypertensive disorder   • Left shoulder pain        Past Medical History:   Diagnosis Date   • Anxiety    • Anxiety and depression    • Arthritis    • Atypical squamous cells of undetermined significance (ASC-US) on cervical Pap smear    • Back pain     back spasms and leg spasms   • Backache    • Bronchitis    • Constipation    • Depression    • Dysplasia of cervix     stage 2   • Encounter for  visit    • Encounter for routine gynecological examination    • Epigastric pain    • Exanthematous disorder    • GERD (gastroesophageal reflux disease)    • HPV (human papilloma virus) infection    • Hypertensive disorder     resolved (with pregnancy)   • IBS (irritable bowel syndrome)    • Low back pain    • Migraine    • Obesity    • Pap smear abnormality of cervix     grade 2 dysplagia   • PCOS (polycystic ovarian syndrome)    • Polycystic ovaries    • Psoriasis     • Psychogenic pruritus    • Surgery follow-up    • Wears glasses         Past Surgical History:   Procedure Laterality Date   •  SECTION  2012    primary   • CHOLECYSTECTOMY     • COLONOSCOPY     • ENDOSCOPY     • FRACTURE SURGERY      boxers fracture-2016   • LEEP N/A 2018    Procedure: LOOP ELECTROCAUTERY EXCISION PROCEDURE;  Surgeon: Adrienne Diego MD;  Location: [facility];  Service: Obstetrics/Gynecology        PT Ortho     Row Name 22 09       Precautions and Contraindications    Precautions/Limitations no known precautions/limitations  -    Precautions posteriosuperior labral tear 5mm  -       Posture/Observations    Posture/Observations Comments TTP L proximal bicep  -          User Key  (r) = Recorded By, (t) = Taken By, (c) = Cosigned By    Initials Name Provider Type    Adrienne Rangel PTA Physical Therapist Assistant                             PT Assessment/Plan     Row Name 22 09          PT Assessment    Functional Limitations Limitation in home management;Limitations in functional capacity and performance;Performance in self-care ADL  -     Impairments Muscle strength;Pain;Joint mobility;Posture  -     Assessment Comments increased HEP this date and patient was given written instruction; DId well with all next strengthening exercises just shoulder fatigue  -     Rehab Potential Good  -     Patient/caregiver participated in establishment of treatment plan and goals Yes  -     Patient would benefit from skilled therapy intervention Yes  -            PT Plan    PT Frequency 1x/week;2x/week  -     Predicted Duration of Therapy Intervention (PT) 4-6 weeks  -     PT Plan Comments Continue with manual to L pec and upper trap; Progress strength in the L shoulder and scapular stability.  -           User Key  (r) = Recorded By, (t) = Taken By, (c) = Cosigned By    Initials Name Provider Type    Adrienne Rangel PTA Physical Therapist Assistant                  Modalities     Row Name 04/27/22 0900             Ice    Ice Applied Yes  -KH      Location L shld with IFC  -KH      Ice S/P Rx Yes  -KH              ELECTRICAL STIMULATION    Attended/Unattended Unattended  -KH      Stimulation Type IFC  -      Location/Electrode Placement/Other L shld with ice  -KH      PT E-Stim Unattended Minutes 15  -KH            User Key  (r) = Recorded By, (t) = Taken By, (c) = Cosigned By    Initials Name Provider Type    Adrienne Rangel PTA Physical Therapist Assistant               OP Exercises     Row Name 04/27/22 0900             Subjective Comments    Subjective Comments Patient reports that she thinks her shoulder is some better; Able to sweep and mop yesterday because she felt like it; Denies pain today just feels stiffness; Shoulder just felt tired after doing her ADLs yesterday;  -KH              Subjective Pain    Able to rate subjective pain? yes  -KH      Pre-Treatment Pain Level --  Does not rate pain just stiffness  -KH      Post-Treatment Pain Level 0  -KH              Exercise 1    Exercise Name 1 pro ll UE strength  -KH      Time 1 10 mins F/B  -KH      Additional Comments level 3.5; seat 10  -KH              Exercise 2    Exercise Name 2 upper trap stretch  -KH      Sets 2 3  -KH      Time 2 30sec  -KH              Exercise 3    Exercise Name 3 levator scap stretch  -KH      Sets 3 3  -KH      Time 3 30sec  -KH              Exercise 4    Exercise Name 4 corner pec stretch  -KH      Sets 4 3  -KH      Time 4 30sec  -KH              Exercise 5    Exercise Name 5 shld squeezes  -KH      Sets 5 2  -KH      Reps 5 10  -KH      Additional Comments yellow tband  -KH              Exercise 6    Exercise Name 6 lat pulls  -KH      Sets 6 2  -KH      Reps 6 10  -KH      Additional Comments yellow tband  -KH              Exercise 7    Exercise Name 7 tband IR/ER  -KH      Sets 7 2  -KH      Reps 7 10  -KH      Additional Comments yellow tband  -KH              Exercise 8     Exercise Name 8 high rows with tband  -      Sets 8 2  -KH      Reps 8 10  -KH      Additional Comments yellow tband  -              Exercise 9    Exercise Name 9 STM to pec, bicep, and upper trap  -      Time 9 10 mins  -              Exercise 10    Exercise Name 10 see modalities  -            User Key  (r) = Recorded By, (t) = Taken By, (c) = Cosigned By    Initials Name Provider Type    Adrienne Rangel PTA Physical Therapist Assistant                              PT OP Goals     Row Name 04/27/22 0900          PT Short Term Goals    STG Date to Achieve 05/11/22  -     STG 1 Pt is indpt with HEP.  -     STG 1 Progress Ongoing  -     STG 2 Pt is able to complete AROM L shoulder without increased pain.  -     STG 2 Progress Ongoing  -            Long Term Goals    LTG Date to Achieve 05/25/22  -     LTG 1 Pt demos improved L shoulder MMT to 4+/5 or better in all planes with minimal pain increase.  -     LTG 1 Progress Ongoing  -     LTG 2 Pt demos minimal palpation tenderness along pecs, biceps, and middle trap/rhomboids.  -     LTG 2 Progress Ongoing  -     LTG 3 QuickDASH improved to </= 10.  -     LTG 3 Progress Ongoing  -     LTG 4 Pt notes overall subjecective improvment 80% or better.  -     LTG 4 Progress Ongoing  -     LTG 5 Pt is able to care for and  daughter without increased L shoulder pain.  -     LTG 5 Progress Ongoing  -            Time Calculation    PT Goal Re-Cert Due Date 05/04/22  -           User Key  (r) = Recorded By, (t) = Taken By, (c) = Cosigned By    Initials Name Provider Type    Adrienne Rangel PTA Physical Therapist Assistant                Therapy Education  Education Details: Mid & High Rows, Lat pulls, Shoulder IR/ER all with Tband  Given: HEP  Program: New  How Provided: Verbal, Demonstration, Written  Provided to: Patient  Level of Understanding: Teach back education performed, Verbalized, Demonstrated              Time  Calculation:   Start Time: 0934  Stop Time: 1030  Time Calculation (min): 56 min  Untimed Charges  PT E-Stim Unattended Minutes: 15  Total Minutes  Untimed Charges Total Minutes: 15   Total Minutes: 15  Therapy Charges for Today     Code Description Service Date Service Provider Modifiers Qty    57583015617 HC PT THER SUPP EA 15 MIN 4/27/2022 Adrienne Arvizu PTA GP 1    78412989128 HC PT ELECTRICAL STIM UNATTENDED 4/27/2022 Adrienne Arvizu, MATT CQ 1    21337045626 HC PT MANUAL THERAPY EA 15 MIN 4/27/2022 Adrienne Arvizu PTA GP, CQ 1    39412804598 HC PT THER PROC EA 15 MIN 4/27/2022 Adrienne Arvizu PTA GP, CQ 2                    Adrienne Arvizu PTA  4/27/2022

## 2022-05-01 LAB — TESTOST SERPL-MCNC: 20.2 NG/DL

## 2022-05-04 ENCOUNTER — HOSPITAL ENCOUNTER (OUTPATIENT)
Dept: PHYSICAL THERAPY | Facility: HOSPITAL | Age: 42
Setting detail: THERAPIES SERIES
Discharge: HOME | End: 2022-05-04

## 2022-05-04 DIAGNOSIS — M25.312 SHOULDER INSTABILITY, LEFT: ICD-10-CM

## 2022-05-04 DIAGNOSIS — M25.512 LEFT SHOULDER PAIN, UNSPECIFIED CHRONICITY: Primary | ICD-10-CM

## 2022-05-04 PROCEDURE — G0283 ELEC STIM OTHER THAN WOUND: HCPCS

## 2022-05-04 PROCEDURE — 97140 MANUAL THERAPY 1/> REGIONS: CPT

## 2022-05-04 PROCEDURE — 97110 THERAPEUTIC EXERCISES: CPT

## 2022-05-04 NOTE — THERAPY PROGRESS REPORT/RE-CERT
"    Outpatient Physical Therapy Ortho Progress Note  [facility]     Patient Name: Maria E Red  : 1980  MRN: [id number]  Today's Date: 2022      Visit Date: 2022     ATTENDANCE: 4.4  SUBJECTIVE IMPROVEMENT: \"minimal\"  NEXT MD APPOINTMENT: 22  RECERT DATE:22    THERAPY DIAGNOSIS: L shoulder pain/instability       Visit Dx:    ICD-10-CM ICD-9-CM   1. Left shoulder pain, unspecified chronicity  M25.512 719.41   2. Shoulder instability, left  M25.312 718.81       Patient Active Problem List   Diagnosis   • Anxiety   • Mood disorder ([facility])   • Major depressive disorder with current active episode   • GERD (gastroesophageal reflux disease)   • Other insomnia   • Back muscle spasm   • Onychomycosis of toenail   • Dysplasia of cervix, high grade MARILEE 2   • Dysplasia of cervix, high grade MARILEE 2   • Vitamin D deficiency   • Low serum vitamin B12   • Generalized anxiety disorder   • Goiter   • Snoring   • Class 3 severe obesity due to excess calories with body mass index (BMI) of 40.0 to 44.9 in adult ([facility])   • Hypertensive disorder   • Left shoulder pain        Past Medical History:   Diagnosis Date   • Anxiety    • Anxiety and depression    • Arthritis    • Atypical squamous cells of undetermined significance (ASC-US) on cervical Pap smear    • Back pain     back spasms and leg spasms   • Backache    • Bronchitis    • Constipation    • Depression    • Dysplasia of cervix     stage 2   • Encounter for  visit    • Encounter for routine gynecological examination    • Epigastric pain    • Exanthematous disorder    • GERD (gastroesophageal reflux disease)    • HPV (human papilloma virus) infection    • Hypertensive disorder     resolved (with pregnancy)   • IBS (irritable bowel syndrome)    • Low back pain    • Migraine    • Obesity    • Pap smear abnormality of cervix     grade 2 dysplagia   • PCOS (polycystic ovarian syndrome)    • Polycystic ovaries    • Psoriasis    • " Psychogenic pruritus    • Surgery follow-up    • Wears glasses         Past Surgical History:   Procedure Laterality Date   •  SECTION  2012    primary   • CHOLECYSTECTOMY     • COLONOSCOPY     • ENDOSCOPY     • FRACTURE SURGERY      boxers fracture-2016   • LEEP N/A 2018    Procedure: LOOP ELECTROCAUTERY EXCISION PROCEDURE;  Surgeon: Adrienne Diego MD;  Location: [facility];  Service: Obstetrics/Gynecology        PT Ortho     Row Name 22 0900       Subjective Comments    Subjective Comments Pt notes very sore throughout the L shoulder today, notes most in the pecs, upper trap and next to scapula. she reports doing tband exercises every other day however still getting very sore. Also did 3 full loads of laundry yesterday which increased her pain.  -AC       Precautions and Contraindications    Precautions/Limitations no known precautions/limitations  -AC    Precautions posteriosuperior labral tear 5mm  -AC       Subjective Pain    Able to rate subjective pain? yes  -AC    Pre-Treatment Pain Level 5  -AC    Post-Treatment Pain Level 5  -AC       Posture/Observations    Posture/Observations Comments TTP L upper traps, medial/proximal pecs, and rhomboids/middle traps. forward head and rounded shoulders posture remains with anterior shoulder pain/pinching noted with IR.  -AC       General ROM    GENERAL ROM COMMENTS L shoulder WNLs with pain noted only with IR.  -AC       MMT Left Upper Ext    Lt Shoulder Flexion MMT, Gross Movement (4+/5) good plus  -AC    Lt Shoulder Extension MMT, Gross Movement (4+/5) good plus  -AC    Lt Shoulder ABduction MMT, Gross Movement (4-/5) good minus  -AC    Lt Shoulder ADduction MMT, Gross Movement (4+/5) good plus  -AC    Lt Shoulder Internal Rotation MMT, Gross Movement (4/5) good  -AC    Lt Shoulder External Rotation MMT, Gross Movement (4/5) good  -AC    Lt Upper Extremity Comments  pain with flexion, abduction, and IR/ER.  -AC          User Key  (r) =  Recorded By, (t) = Taken By, (c) = Cosigned By    Initials Name Provider Type    Gisela Mcdonald, PT Physical Therapist                             PT Assessment/Plan     Row Name 05/04/22 1100 05/04/22 0900       PT Assessment    Functional Limitations Limitation in home management;Limitations in functional capacity and performance;Performance in self-care ADL  -AC --    Impairments Muscle strength;Pain;Joint mobility;Posture  -AC --    Assessment Comments Pt educated today to decrease repetitions in HEP to allow for slower increase in strength due to significant pain increase since addtl band HEP added. re-evaluation completed today both STGs met at this time. LTgs remain unmet and pt demo's increased L shoulder pain today. She continues to have significant L pec, upper trap, and meddle trap/rhomboids trigger point tenderness. LUE remains weak compared to RLE and pt remains with forward head and rounded shoulders posture. Pt has recently stopped working overtime schedule at work and will be able to complete therapy 2x/week, increased frequency to decrease pain and improve muscle tension and strength to return to PLOF.  -AC treatment intensity decreased today due to soreness/pain increase since last session.  -AC    Rehab Potential Good  -AC --    Patient/caregiver participated in establishment of treatment plan and goals Yes  -AC --    Patient would benefit from skilled therapy intervention Yes  -AC --       PT Plan    PT Frequency 2x/week  -AC 1x/week;2x/week  -AC    Predicted Duration of Therapy Intervention (PT) 3-4 weeks  -AC 4-6 weeks  -AC    PT Plan Comments continue with POC for manual soft tissue work, possible TPDN if pt agreeable. Continue to progress strength and shoulder stability as able and modalities as needed.  -AC --          User Key  (r) = Recorded By, (t) = Taken By, (c) = Cosigned By    Initials Name Provider Type    Gisela Mcdonald, PT Physical Therapist                 Modalities      Row Name 05/04/22 0900             Ice    Ice Applied Yes  -AC      Location L shoulder with IFC  -AC      Ice S/P Rx Yes  -AC              ELECTRICAL STIMULATION    Attended/Unattended Unattended  -AC      Stimulation Type IFC  -AC      Location/Electrode Placement/Other L shoulder with ice  -AC      PT E-Stim Unattended Minutes 10  -AC            User Key  (r) = Recorded By, (t) = Taken By, (c) = Cosigned By    Initials Name Provider Type    AC Gisela Dimas, PT Physical Therapist               OP Exercises     Row Name 05/04/22 0900             Subjective Comments    Subjective Comments Pt notes very sore throughout the L shoulder today, notes most in the pecs, upper trap and next to scapula. she reports doing tband exercises every other day however still getting very sore. Also did 3 full loads of laundry yesterday which increased her pain.  -AC              Subjective Pain    Able to rate subjective pain? yes  -AC      Pre-Treatment Pain Level 5  -AC      Post-Treatment Pain Level 5  -AC              Total Minutes    21610 - PT Therapeutic Exercise Minutes 10  -AC      00498 - PT Manual Therapy Minutes 20  -AC              Exercise 1    Exercise Name 1 Pro II- L2.5  -AC      Time 1 2.5 min fdw/back  -AC              Exercise 2    Exercise Name 2 MHP to L pec/shoulder  -AC      Time 2 8 min  -AC              Exercise 3    Exercise Name 3 see manual  -AC      Time 3 15 min  -AC              Exercise 4    Exercise Name 4 ROM/MMT- see ortho  -AC              Exercise 5    Exercise Name 5 see modalities  -AC      Time 5 10  -AC            User Key  (r) = Recorded By, (t) = Taken By, (c) = Cosigned By    Initials Name Provider Type    AC Gisela Dimas, PT Physical Therapist                         Manual Rx (last 36 hours)     Manual Treatments     Row Name 05/04/22 0900             Total Minutes    33535 - PT Manual Therapy Minutes 20  -AC              Manual Rx 1    Manual Rx 1 Location L middle/upper  traps, rhomboids, and pecs  -AC      Manual Rx 1 Type manual trigger point release  -AC      Manual Rx 1 Duration 20  -AC            User Key  (r) = Recorded By, (t) = Taken By, (c) = Cosigned By    Initials Name Provider Type    Gisela Mcdonald, PT Physical Therapist                 PT OP Goals     Row Name 05/04/22 0900          PT Short Term Goals    STG Date to Achieve 05/11/22  -AC     STG 1 Pt is indpt with HEP.  -AC     STG 1 Progress Met;Ongoing  -AC     STG 2 Pt is able to complete AROM L shoulder without increased pain.  -AC     STG 2 Progress Partially Met  -AC     STG 2 Progress Comments only mild pulling with IR  -AC            Long Term Goals    LTG Date to Achieve 05/25/22  -AC     LTG 1 Pt demos improved L shoulder MMT to 4+/5 or better in all planes with minimal pain increase.  -AC     LTG 1 Progress Not Met  -AC     LTG 2 Pt demos minimal palpation tenderness along pecs, biceps, and middle trap/rhomboids.  -AC     LTG 2 Progress Not Met  -AC     LTG 3 QuickDASH improved to </= 10.  -AC     LTG 3 Progress Ongoing  -AC     LTG 4 Pt notes overall subjecective improvement 80% or better.  -AC     LTG 4 Progress Not Met  -AC     LTG 5 Pt is able to care for and  daughter without increased L shoulder pain.  -AC     LTG 5 Progress Not Met  -AC            Time Calculation    PT Goal Re-Cert Due Date 05/25/22  -           User Key  (r) = Recorded By, (t) = Taken By, (c) = Cosigned By    Initials Name Provider Type    Gisela Mcdonald PT Physical Therapist                               Time Calculation:   Start Time: 0940  Stop Time: 1020  Time Calculation (min): 40 min  Timed Charges  74943 - PT Therapeutic Exercise Minutes: 10  65089 - PT Manual Therapy Minutes: 20  Untimed Charges  PT E-Stim Unattended Minutes: 10  Total Minutes  Timed Charges Total Minutes: 30  Untimed Charges Total Minutes: 10   Total Minutes: 40  Therapy Charges for Today     Code Description Service Date Service  Provider Modifiers Qty    06834165675 HC PT THER PROC EA 15 MIN 5/4/2022 Gisela Dimas, PT GP 1    71609428981 HC PT MANUAL THERAPY EA 15 MIN 5/4/2022 Gisela Dimas, PT GP 1    78156530321 HC PT ELECTRICAL STIM UNATTENDED 5/4/2022 Gisela Dimas, PT  1                    Gisela Dimas, PT  5/4/2022

## 2022-05-11 ENCOUNTER — HOSPITAL ENCOUNTER (OUTPATIENT)
Dept: PHYSICAL THERAPY | Facility: HOSPITAL | Age: 42
Setting detail: THERAPIES SERIES
Discharge: HOME | End: 2022-05-11

## 2022-05-11 DIAGNOSIS — M25.312 SHOULDER INSTABILITY, LEFT: ICD-10-CM

## 2022-05-11 DIAGNOSIS — M25.512 LEFT SHOULDER PAIN, UNSPECIFIED CHRONICITY: Primary | ICD-10-CM

## 2022-05-11 PROCEDURE — 97140 MANUAL THERAPY 1/> REGIONS: CPT

## 2022-05-11 PROCEDURE — 97110 THERAPEUTIC EXERCISES: CPT

## 2022-05-11 PROCEDURE — G0283 ELEC STIM OTHER THAN WOUND: HCPCS

## 2022-05-11 NOTE — THERAPY TREATMENT NOTE
Outpatient Physical Therapy Ortho Treatment Note  [facility]     Patient Name: Maria E Red  : 1980  MRN: [id number]  Today's Date: 2022      Visit Date: 2022     Subjective Improvement: minimal %  Attendance:   (57/yr)  Next MD Visit : 2022  Recert Date:  2022        Therapy Diagnosis:  L Shoulder pain/instability.     Visit Dx:    ICD-10-CM ICD-9-CM   1. Left shoulder pain, unspecified chronicity  M25.512 719.41   2. Shoulder instability, left  M25.312 718.81       Patient Active Problem List   Diagnosis   • Anxiety   • Mood disorder ([facility])   • Major depressive disorder with current active episode   • GERD (gastroesophageal reflux disease)   • Other insomnia   • Back muscle spasm   • Onychomycosis of toenail   • Dysplasia of cervix, high grade MARILEE 2   • Dysplasia of cervix, high grade MARILEE 2   • Vitamin D deficiency   • Low serum vitamin B12   • Generalized anxiety disorder   • Goiter   • Snoring   • Class 3 severe obesity due to excess calories with body mass index (BMI) of 40.0 to 44.9 in adult ([facility])   • Hypertensive disorder   • Left shoulder pain        Past Medical History:   Diagnosis Date   • Anxiety    • Anxiety and depression    • Arthritis    • Atypical squamous cells of undetermined significance (ASC-US) on cervical Pap smear    • Back pain     back spasms and leg spasms   • Backache    • Bronchitis    • Constipation    • Depression    • Dysplasia of cervix     stage 2   • Encounter for  visit    • Encounter for routine gynecological examination    • Epigastric pain    • Exanthematous disorder    • GERD (gastroesophageal reflux disease)    • HPV (human papilloma virus) infection    • Hypertensive disorder     resolved (with pregnancy)   • IBS (irritable bowel syndrome)    • Low back pain    • Migraine    • Obesity    • Pap smear abnormality of cervix     grade 2 dysplagia   • PCOS (polycystic ovarian syndrome)    • Polycystic ovaries    •  Psoriasis    • Psychogenic pruritus    • Surgery follow-up    • Wears glasses         Past Surgical History:   Procedure Laterality Date   •  SECTION  2012    primary   • CHOLECYSTECTOMY     • COLONOSCOPY     • ENDOSCOPY     • FRACTURE SURGERY      boxers fracture-2016   • LEEP N/A 2018    Procedure: LOOP ELECTROCAUTERY EXCISION PROCEDURE;  Surgeon: Adrienne Diego MD;  Location: [facility];  Service: Obstetrics/Gynecology        PT Ortho     Row Name 22 0800       Precautions and Contraindications    Precautions/Limitations no known precautions/limitations  -    Precautions posteriosuperior labral tear 5mm  -       Posture/Observations    Posture/Observations Comments TTP L shomboid, and pecs  -          User Key  (r) = Recorded By, (t) = Taken By, (c) = Cosigned By    Initials Name Provider Type    Adrienne Rangel PTA Physical Therapist Assistant                             PT Assessment/Plan     Row Name 22 0800          PT Assessment    Functional Limitations Limitation in home management;Limitations in functional capacity and performance;Performance in self-care ADL  -     Impairments Muscle strength;Pain;Joint mobility;Posture  -     Assessment Comments responded well to manual; no increased pain, no tightness in L upper trap but tightness in the rhomboid and pecs; progressed strengthening gently  -     Rehab Potential Good  -     Patient/caregiver participated in establishment of treatment plan and goals Yes  -     Patient would benefit from skilled therapy intervention Yes  -            PT Plan    PT Frequency 2x/week  -     Predicted Duration of Therapy Intervention (PT) 3-4 weeks  -     PT Plan Comments Continue with manual and gentle strength and stabilization as able.  -           User Key  (r) = Recorded By, (t) = Taken By, (c) = Cosigned By    Initials Name Provider Type    Adrienne Rangel PTA Physical Therapist Assistant                 Modalities   "   Row Name 05/11/22 0800             Subjective Pain    Post-Treatment Pain Level 3  -KH              Ice    Ice Applied Yes  -KH      Location L shoulder with IF  -      Ice S/P Rx Yes  -KH              ELECTRICAL STIMULATION    Attended/Unattended Unattended  -      Stimulation Type IFC  -      Location/Electrode Placement/Other L shoulder with ice  -KH      PT E-Stim Unattended Minutes 15  -KH            User Key  (r) = Recorded By, (t) = Taken By, (c) = Cosigned By    Initials Name Provider Type    Adrienne Rangel PTA Physical Therapist Assistant               OP Exercises     Row Name 05/11/22 0800             Subjective Comments    Subjective Comments Feeling much better that she did last week; living on pain medication patches; But has backed off exercises and it has seemed to help.  -KH              Subjective Pain    Able to rate subjective pain? yes  -KH      Pre-Treatment Pain Level 4  -KH      Post-Treatment Pain Level 3  -KH              Exercise 1    Exercise Name 1 pro ll UE strength  -KH      Time 1 10 mins  -KH      Additional Comments level 3; seat 8  -KH              Exercise 2    Exercise Name 2 Manual: STM to L upper trap, rhomboids & pecs w/ PROM of the L shoulder shoulder  -KH      Time 2 15 mins  -KH              Exercise 3    Exercise Name 3 supine serratus circles cw/ccw  -KH      Sets 3 2  -KH      Reps 3 10  -KH              Exercise 4    Exercise Name 4 supine serratus punches  -KH      Sets 4 2  -KH      Reps 4 10  -KH              Exercise 5    Exercise Name 5 rhomboid stretch L  -KH      Sets 5 3  -KH      Time 5 30\" holds  -KH              Exercise 6    Exercise Name 6 doorway stretch for pecs  -KH      Sets 6 3  -KH      Time 6 30\" holds  -KH            User Key  (r) = Recorded By, (t) = Taken By, (c) = Cosigned By    Initials Name Provider Type    Adrienne Rangel PTA Physical Therapist Assistant                              PT OP Goals     Row Name 05/11/22 0800          PT " Short Term Goals    STG Date to Achieve 05/11/22  -     STG 1 Pt is indpt with HEP.  -     STG 1 Progress Met;Ongoing  -     STG 2 Pt is able to complete AROM L shoulder without increased pain.  -     STG 2 Progress Partially Met  -            Long Term Goals    LTG Date to Achieve 05/25/22  -     LTG 1 Pt demos improved L shoulder MMT to 4+/5 or better in all planes with minimal pain increase.  -     LTG 1 Progress Not Met  -     LTG 2 Pt demos minimal palpation tenderness along pecs, biceps, and middle trap/rhomboids.  -     LTG 2 Progress Not Met  -     LTG 3 QuickDASH improved to </= 10.  -     LTG 3 Progress Ongoing  -     LTG 4 Pt notes overall subjecective improvment 80% or better.  -     LTG 4 Progress Not Met  -     LTG 5 Pt is able to care for and  daughter without increased L shoulder pain.  -     LTG 5 Progress Not Met  -            Time Calculation    PT Goal Re-Cert Due Date 05/25/22  -           User Key  (r) = Recorded By, (t) = Taken By, (c) = Cosigned By    Initials Name Provider Type    Adrienne Rangel PTA Physical Therapist Assistant                               Time Calculation:   Start Time: 0850  Stop Time: 0947  Time Calculation (min): 57 min  Untimed Charges  PT E-Stim Unattended Minutes: 15  Total Minutes  Untimed Charges Total Minutes: 15   Total Minutes: 15  Therapy Charges for Today     Code Description Service Date Service Provider Modifiers Qty    62322425631 HC PT THER SUPP EA 15 MIN 5/11/2022 Adrienne Arvizu PTA GP 1    16110040091 HC PT ELECTRICAL STIM UNATTENDED 5/11/2022 Adrienne Arvizu PTA CQ 1    49359823043 HC PT MANUAL THERAPY EA 15 MIN 5/11/2022 Adrienne Arvizu PTA GP, CQ 1    86532250817 HC PT THER PROC EA 15 MIN 5/11/2022 Adrienne Arvizu PTA GP, CQ 2                    Adrienne Arvizu PTA  5/11/2022

## 2022-05-13 ENCOUNTER — APPOINTMENT (OUTPATIENT)
Dept: PHYSICAL THERAPY | Facility: HOSPITAL | Age: 42
End: 2022-05-13

## 2022-05-17 ENCOUNTER — OFFICE VISIT (OUTPATIENT)
Dept: ORTHOPEDIC SURGERY | Facility: CLINIC | Age: 42
End: 2022-05-17

## 2022-05-17 VITALS — BODY MASS INDEX: 41.28 KG/M2 | WEIGHT: 263 LBS | HEIGHT: 67 IN

## 2022-05-17 DIAGNOSIS — M25.312 SHOULDER INSTABILITY, LEFT: ICD-10-CM

## 2022-05-17 DIAGNOSIS — S43.432D TEAR OF LEFT GLENOID LABRUM, SUBSEQUENT ENCOUNTER: ICD-10-CM

## 2022-05-17 DIAGNOSIS — M25.512 LEFT SHOULDER PAIN, UNSPECIFIED CHRONICITY: Primary | ICD-10-CM

## 2022-05-17 PROBLEM — S43.432A TEAR OF LEFT GLENOID LABRUM: Status: ACTIVE | Noted: 2022-05-17

## 2022-05-17 PROCEDURE — 99214 OFFICE O/P EST MOD 30 MIN: CPT | Performed by: NURSE PRACTITIONER

## 2022-05-17 RX ORDER — MELOXICAM 15 MG/1
15 TABLET ORAL DAILY
Qty: 14 TABLET | Refills: 0 | Status: SHIPPED | OUTPATIENT
Start: 2022-05-17 | End: 2022-05-31

## 2022-05-17 NOTE — PROGRESS NOTES
"Maria E Red is a 41 y.o. female returns for     Chief Complaint   Patient presents with   • Left Shoulder - Follow-up       HISTORY OF PRESENT ILLNESS:    Mrs. Red is a 41-year-old female who presents today to follow-up on left shoulder pain.  Patient is been evaluated in office before by Dr. Avery.  She had MRI which showed labral tear with paralabral cys but otherwise unremarkable.  She has had vague complaints in the past which suggest possible subluxation.  She has been treated with NSAIDs, physical therapy, activity modification.  She reports she has seen some improvement with these interventions.  Patient reports her pain is better than it was at previous appointment, she rates her pain is a 1 out of 10.  She continues to experience some symptoms of instability.       CONCURRENT MEDICAL HISTORY:    The following portions of the patient's history were reviewed and updated as appropriate: allergies, current medications, past family history, past medical history, past social history, past surgical history and problem list.     ROS  No fevers or chills.  No chest pain or shortness of air.  No GI or  disturbances.    PHYSICAL EXAMINATION:       Ht 170.2 cm (67\")   Wt 119 kg (263 lb)   BMI 41.19 kg/m²     Physical Exam  Vitals and nursing note reviewed.   Constitutional:       General: She is not in acute distress.     Appearance: She is well-developed. She is not toxic-appearing.   HENT:      Head: Normocephalic.   Pulmonary:      Effort: Pulmonary effort is normal. No respiratory distress.   Skin:     General: Skin is warm and dry.   Neurological:      Mental Status: She is alert and oriented to person, place, and time.   Psychiatric:         Behavior: Behavior normal.         Thought Content: Thought content normal.         Judgment: Judgment normal.         GAIT:     []  Normal  []  Antalgic    Assistive device: []  None  []  Walker     []  Crutches  []  Cane     []  Wheelchair  []  " Stretcher    Left Shoulder Exam     Range of Motion   Active abduction: normal   Extension: normal   External rotation: normal   Forward flexion: normal   Internal rotation 90 degrees: normal     Other   Erythema: absent  Sensation: normal  Pulse: present     Comments:  Neurovascular intact              No results found.          ASSESSMENT:    Diagnoses and all orders for this visit:    Left shoulder pain, unspecified chronicity    Shoulder instability, left    Tear of left glenoid labrum, subsequent encounter    Other orders  -     meloxicam (Mobic) 15 MG tablet; Take 1 tablet by mouth Daily for 14 days **Take with food**          PLAN    Patient reports improvement of pain.  Patient has normal range of motion of shoulder today.  When discussing available options, patient opts to proceed with prescription strength NSAID.  How to take medication properly explained.  If pain increases she may be interested in subacromial injection in the future.    If instability symptoms or feelings of subluxation get worse, she may be interested in follow-up with surgeon to discuss possible repair of labrum.  Signs and symptoms to report and when to seek care explained.  Patient to follow-up as needed.    Return if symptoms worsen or fail to improve.    EMR Dragon/Transciption Disclaimer: Some of this note may be an electronic transcription/translation of spoken language to printed text.  The electronic translation of spoken language may permit erroneous, or at times, nonsensical words or phrases to be inadvertently transcribed. Although I have reviewed the note for such errors, some may still exist.       This document has been electronically signed by KIERRA Garcia on May 17, 2022 11:33 CDT

## 2022-05-18 ENCOUNTER — HOSPITAL ENCOUNTER (OUTPATIENT)
Dept: PHYSICAL THERAPY | Facility: HOSPITAL | Age: 42
Setting detail: THERAPIES SERIES
Discharge: HOME | End: 2022-05-18

## 2022-05-18 DIAGNOSIS — M25.512 LEFT SHOULDER PAIN, UNSPECIFIED CHRONICITY: Primary | ICD-10-CM

## 2022-05-18 DIAGNOSIS — M25.312 SHOULDER INSTABILITY, LEFT: ICD-10-CM

## 2022-05-18 PROCEDURE — 97110 THERAPEUTIC EXERCISES: CPT

## 2022-05-18 PROCEDURE — G0283 ELEC STIM OTHER THAN WOUND: HCPCS

## 2022-05-18 NOTE — THERAPY TREATMENT NOTE
Outpatient Physical Therapy Ortho Treatment Note  [facility]     Patient Name: Maria E Rde  : 1980  MRN: [id number]  Today's Date: 2022      Visit Date: 2022   ATTENDANCE:  (57/yr)  SUBJECTIVE IMPROVEMENT: 90%  NEXT MD APPOINTMENT: prn  RECERT DATE: 22    THERAPY DIAGNOSIS: L shoulder pain/instability    Visit Dx:    ICD-10-CM ICD-9-CM   1. Left shoulder pain, unspecified chronicity  M25.512 719.41   2. Shoulder instability, left  M25.312 718.81       Patient Active Problem List   Diagnosis   • Anxiety   • Mood disorder ([facility])   • Major depressive disorder with current active episode   • GERD (gastroesophageal reflux disease)   • Other insomnia   • Back muscle spasm   • Onychomycosis of toenail   • Dysplasia of cervix, high grade MARILEE 2   • Dysplasia of cervix, high grade MARILEE 2   • Vitamin D deficiency   • Low serum vitamin B12   • Generalized anxiety disorder   • Goiter   • Snoring   • Class 3 severe obesity due to excess calories with body mass index (BMI) of 40.0 to 44.9 in adult ([facility])   • Hypertensive disorder   • Left shoulder pain   • Shoulder instability, left   • Tear of left glenoid labrum        Past Medical History:   Diagnosis Date   • Anxiety    • Anxiety and depression    • Arthritis    • Atypical squamous cells of undetermined significance (ASC-US) on cervical Pap smear    • Back pain     back spasms and leg spasms   • Backache    • Bronchitis    • Constipation    • Depression    • Dysplasia of cervix     stage 2   • Encounter for  visit    • Encounter for routine gynecological examination    • Epigastric pain    • Exanthematous disorder    • GERD (gastroesophageal reflux disease)    • HPV (human papilloma virus) infection    • Hypertensive disorder     resolved (with pregnancy)   • IBS (irritable bowel syndrome)    • Low back pain    • Migraine    • Obesity    • Pap smear abnormality of cervix     grade 2 dysplagia   • PCOS (polycystic ovarian  syndrome)    • Polycystic ovaries    • Psoriasis    • Psychogenic pruritus    • Surgery follow-up    • Wears glasses         Past Surgical History:   Procedure Laterality Date   •  SECTION  2012    primary   • CHOLECYSTECTOMY     • COLONOSCOPY     • ENDOSCOPY     • FRACTURE SURGERY      boxers fracture-2016   • LEEP N/A 2018    Procedure: LOOP ELECTROCAUTERY EXCISION PROCEDURE;  Surgeon: Adrienne Diego MD;  Location: [facility];  Service: Obstetrics/Gynecology        PT Ortho     Row Name 22 0900       Precautions and Contraindications    Precautions/Limitations no known precautions/limitations  -AC    Precautions posteriosuperior labral tear 5mm  -AC          User Key  (r) = Recorded By, (t) = Taken By, (c) = Cosigned By    Initials Name Provider Type    Gisela Mcdonald, PT Physical Therapist                             PT Assessment/Plan     Row Name 22 1000          PT Assessment    Assessment Comments Treatment tolerated well today with two goals met in indicating a 90% subjective imporvement, and having minimal tenderness upon palpation on her upper trap, pecs, and rhomboids. Pt progressed scapular strengthening and stability today without pain.  -AC            PT Plan    PT Frequency 2x/week  -AC     Predicted Duration of Therapy Intervention (PT) 3-4 weeks  -AC     PT Plan Comments Continue with POC for scapular/shoulder stretching, strengthening, and stabilization. Possible decrease to 1/week frequency if pain remains improved.   -AC           User Key  (r) = Recorded By, (t) = Taken By, (c) = Cosigned By    Initials Name Provider Type    Gisela Mcdonald PT Physical Therapist                 Modalities     Row Name 22 0900             Ice    Ice Applied Yes  -AC      Location L shoulder with IFC  -AC      Ice S/P Rx Yes  -AC              ELECTRICAL STIMULATION    Attended/Unattended Unattended  -AC      Stimulation Type IFC  -AC      Location/Electrode  Placement/Other L shoulder with ice  -AC            User Key  (r) = Recorded By, (t) = Taken By, (c) = Cosigned By    Initials Name Provider Type    AC Gisela Dimas, PT Physical Therapist               OP Exercises     Row Name 05/18/22 1000 05/18/22 0900          Subjective Comments    Subjective Comments -- Pt notes feeling much better today/ She reports that she feels like she is back to normal and has imporved 90%. she reported stretching at home and feeling a pop behind her shoulder which has since decreaed her pain. She spent 6 hours cleaning the house and her kids rooms and said she felt sore afterward but did not have any significant pain. She noted that she has started her banded exercises back at home because of the decrease in her pain.  -AC            Subjective Pain    Able to rate subjective pain? -- yes  -AC     Pre-Treatment Pain Level -- 0  -AC     Post-Treatment Pain Level -- 0  -AC            Total Minutes    70144 - PT Therapeutic Exercise Minutes 47  -AC --            Exercise 1    Exercise Name 1 -- doorway pec stretch  -AC     Reps 1 -- 3  -AC     Time 1 -- 30s  -AC            Exercise 2    Exercise Name 2 -- upper trap stretch  -AC     Reps 2 -- 3  -AC     Time 2 -- 30s  -AC            Exercise 3    Exercise Name 3 -- levator stretch  -AC     Reps 3 -- 3  -AC     Time 3 -- 30s  -AC            Exercise 4    Exercise Name 4 -- supine scap packing  -AC     Reps 4 -- 3  -AC     Time 4 -- 10s  -AC     Additional Comments -- 5#  -AC            Exercise 5    Exercise Name 5 -- supine scap packing with alt isometrics  -AC     Reps 5 -- 3  -AC     Time 5 -- 15s  -AC     Additional Comments -- 5#  -AC            Exercise 6    Exercise Name 6 -- supine serratus punch  -AC     Sets 6 -- 2  -AC     Reps 6 -- 10  -AC     Additional Comments -- 2#  -AC            Exercise 7    Exercise Name 7 -- shoulder squeezes with blue ball  -AC     Sets 7 -- 2  -AC     Reps 7 -- 20  -AC            Exercise 8     Exercise Name 8 -- no moneys w/ Y Tband  -AC     Sets 8 -- 2  -AC     Reps 8 -- 10  -AC            Exercise 9    Exercise Name 9 -- high row w/ Y tband  -AC     Sets 9 -- 2  -AC     Reps 9 -- 10  -AC            Exercise 10    Exercise Name 10 -- shoulder extension with Y tband  -AC     Sets 10 -- 2  -AC     Reps 10 -- 10  -AC            Exercise 11    Exercise Name 11 -- manual STM to L upper trap, scalenes, sub occiptials, and pecs.  -AC     Time 11 -- 5 min  -AC     Additional Comments -- no true trigger points noted  -AC            Exercise 12    Exercise Name 12 -- prone row  -AC     Reps 12 -- 15  -AC     Additional Comments -- 2#  -AC            Exercise 13    Exercise Name 13 -- prone shoulder extension  -AC     Reps 13 -- 15  -AC     Additional Comments -- 2#  -AC            Exercise 14    Exercise Name 14 -- prone T  -AC     Reps 14 -- 15  -AC     Additional Comments -- 2#  -AC            Exercise 15    Exercise Name 15 -- prone Y  -AC     Reps 15 -- 15  -AC            Exercise 16    Exercise Name 16 -- see modalities  -           User Key  (r) = Recorded By, (t) = Taken By, (c) = Cosigned By    Initials Name Provider Type    AC Gisela Dimas, PT Physical Therapist                              PT OP Goals     Row Name 05/18/22 1000          PT Short Term Goals    STG Date to Achieve 05/11/22  -     STG 1 Pt is indpt with HEP.  -     STG 1 Progress Met;Ongoing  -     STG 2 Pt is able to complete AROM L shoulder without increased pain.  -     STG 2 Progress Partially Met  -            Long Term Goals    LTG Date to Achieve 05/25/22  -     LTG 1 Pt demos improved L shoulder MMT to 4+/5 or better in all planes with minimal pain increase.  -     LTG 1 Progress Not Met  -     LTG 2 Pt demos minimal palpation tenderness along pecs, biceps, and middle trap/rhomboids.  -     LTG 2 Progress Met  -     LTG 3 QuickDASH improved to </= 10.  -     LTG 3 Progress Ongoing  -     LTG 4 Pt  notes overall subjecective improvment 80% or better.  -AC     LTG 4 Progress Met  -AC     LTG 5 Pt is able to care for and  daughter without increased L shoulder pain.  -AC     LTG 5 Progress Not Met  -AC            Time Calculation    PT Goal Re-Cert Due Date 05/25/22  -           User Key  (r) = Recorded By, (t) = Taken By, (c) = Cosigned By    Initials Name Provider Type    AC Gisela Dimas, PT Physical Therapist                               Time Calculation:   Start Time: 0938  Stop Time: 1040  Time Calculation (min): 62 min  Timed Charges  36306 - PT Therapeutic Exercise Minutes: 47  Untimed Charges  PT E-Stim Unattended Minutes: 15  Total Minutes  Timed Charges Total Minutes: 47  Untimed Charges Total Minutes: 15   Total Minutes: 62  Therapy Charges for Today     Code Description Service Date Service Provider Modifiers Qty    00914845974 HC PT THER PROC EA 15 MIN 5/18/2022 Gisela Dimas, PT GP 3    66153468412 HC PT ELECTRICAL STIM UNATTENDED 5/18/2022 Gisela Dimas, PT  1                    Gisela Dimas, PT  5/18/2022

## 2022-05-20 ENCOUNTER — HOSPITAL ENCOUNTER (OUTPATIENT)
Dept: PHYSICAL THERAPY | Facility: HOSPITAL | Age: 42
Setting detail: THERAPIES SERIES
End: 2022-05-20

## 2022-05-24 ENCOUNTER — APPOINTMENT (OUTPATIENT)
Dept: PHYSICAL THERAPY | Facility: HOSPITAL | Age: 42
End: 2022-05-24

## 2022-05-26 ENCOUNTER — APPOINTMENT (OUTPATIENT)
Dept: PHYSICAL THERAPY | Facility: HOSPITAL | Age: 42
End: 2022-05-26

## 2022-05-31 ENCOUNTER — APPOINTMENT (OUTPATIENT)
Dept: PHYSICAL THERAPY | Facility: HOSPITAL | Age: 42
End: 2022-05-31

## 2022-06-01 ENCOUNTER — HOSPITAL ENCOUNTER (OUTPATIENT)
Dept: PHYSICAL THERAPY | Facility: HOSPITAL | Age: 42
Setting detail: THERAPIES SERIES
Discharge: HOME | End: 2022-06-01

## 2022-06-01 DIAGNOSIS — M25.512 LEFT SHOULDER PAIN, UNSPECIFIED CHRONICITY: Primary | ICD-10-CM

## 2022-06-01 DIAGNOSIS — M25.312 SHOULDER INSTABILITY, LEFT: ICD-10-CM

## 2022-06-01 PROCEDURE — 97110 THERAPEUTIC EXERCISES: CPT

## 2022-06-01 NOTE — THERAPY DISCHARGE NOTE
Outpatient Physical Therapy Ortho Progress Note/Discharge Summary  [facility]     Patient Name: Maria E Red  : 1980  MRN: [id number]  Today's Date: 2022      Visit Date: 2022   ATTENDANCE:   SUBJECTIVE IMPROVEMENT: 100%  NEXT MD APPOINTMENT: PRN  RECERT DATE: discharge    THERAPY DIAGNOSIS: L shoulder Instability/Pain    Visit Dx:    ICD-10-CM ICD-9-CM   1. Left shoulder pain, unspecified chronicity  M25.512 719.41   2. Shoulder instability, left  M25.312 718.81       Patient Active Problem List   Diagnosis   • Anxiety   • Mood disorder ([facility])   • Major depressive disorder with current active episode   • GERD (gastroesophageal reflux disease)   • Other insomnia   • Back muscle spasm   • Onychomycosis of toenail   • Dysplasia of cervix, high grade MARILEE 2   • Dysplasia of cervix, high grade MARILEE 2   • Vitamin D deficiency   • Low serum vitamin B12   • Generalized anxiety disorder   • Goiter   • Snoring   • Class 3 severe obesity due to excess calories with body mass index (BMI) of 40.0 to 44.9 in adult ([facility])   • Hypertensive disorder   • Left shoulder pain   • Shoulder instability, left   • Tear of left glenoid labrum        Past Medical History:   Diagnosis Date   • Anxiety    • Anxiety and depression    • Arthritis    • Atypical squamous cells of undetermined significance (ASC-US) on cervical Pap smear    • Back pain     back spasms and leg spasms   • Backache    • Bronchitis    • Constipation    • Depression    • Dysplasia of cervix     stage 2   • Encounter for  visit    • Encounter for routine gynecological examination    • Epigastric pain    • Exanthematous disorder    • GERD (gastroesophageal reflux disease)    • HPV (human papilloma virus) infection    • Hypertensive disorder     resolved (with pregnancy)   • IBS (irritable bowel syndrome)    • Low back pain    • Migraine    • Obesity    • Pap smear abnormality of cervix     grade 2 dysplagia   • PCOS (polycystic  ovarian syndrome)    • Polycystic ovaries    • Psoriasis    • Psychogenic pruritus    • Surgery follow-up    • Wears glasses         Past Surgical History:   Procedure Laterality Date   •  SECTION  2012    primary   • CHOLECYSTECTOMY     • COLONOSCOPY     • ENDOSCOPY     • FRACTURE SURGERY      boxers fracture-2016   • LEEP N/A 2018    Procedure: LOOP ELECTROCAUTERY EXCISION PROCEDURE;  Surgeon: Adrienne Diego MD;  Location: [facility];  Service: Obstetrics/Gynecology        PT Ortho     Row Name 22 1100       Subjective Pain    Able to rate subjective pain? yes  -AC    Pre-Treatment Pain Level 0  -AC       Posture/Observations    Posture/Observations Comments mild rounded shoulders remains however improving well with improving strength for shoulders and scap. occasional pulling pain throughout anterior L shoulder reported, no longer pinching with reaching.  -AC       Left Upper Ext    Lt Shoulder Abduction AROM 175  -AC    Lt Shoulder Flexion AROM 178  -AC    Lt Shoulder External Rotation AROM 87  -AC    Lt Shoulder Internal Rotation AROM 79  -AC       MMT (Manual Muscle Testing)    General MMT Comments grossly 5/5 without increased pain. except ER 4+/5.  -AC          User Key  (r) = Recorded By, (t) = Taken By, (c) = Cosigned By    Initials Name Provider Type    AC Gisela Dimas, PT Physical Therapist                             PT Assessment/Plan     Row Name 22 1200          PT Assessment    Assessment Comments Re-evaluation completed today. All STG/LTG met to this date. Pt shows significant decrease in pain, and improvements noted with strength in all planes, shoulder AROM in all planes without increased pain, and the ability to pick her daughter up without increased pain. QuickDASH score of 2.27 today. Pt discharged today due to progression of goals and relief of symptoms. Instructed to continue HEP to continue shoulder/scapular strengthening and stability to continue  "functional and leisure activities without increased pain.  -AC     Rehab Potential Good  -AC     Patient/caregiver participated in establishment of treatment plan and goals Yes  -AC            PT Plan    PT Plan Comments discharge  -AC           User Key  (r) = Recorded By, (t) = Taken By, (c) = Cosigned By    Initials Name Provider Type    AC Gisela Dimas, PT Physical Therapist                     OP Exercises     Row Name 06/01/22 1259 06/01/22 1100          Subjective Comments    Subjective Comments -- Pt reports that she feels back to her \"normal\" for the past week and has not had any pain recently. Was able to do the slip and slide with her kids over the weekend without increased shoulder pain.  -AC            Subjective Pain    Able to rate subjective pain? -- yes  -AC     Pre-Treatment Pain Level -- 0  -AC            Total Minutes    34448 - PT Therapeutic Exercise Minutes 39  -AC --            Exercise 1    Exercise Name 1 -- Pro II- L4  -AC     Time 1 -- 2.5 fdw/back  -AC            Exercise 2    Exercise Name 2 -- ROM/MMT- see ortho  -AC            Exercise 3    Exercise Name 3 -- tband shoulder ext  -AC     Sets 3 -- 1  -AC     Reps 3 -- 15  -AC     Additional Comments -- RTB  -AC            Exercise 4    Exercise Name 4 -- tband shoulder mid rows  -AC     Sets 4 -- 1  -AC     Reps 4 -- 15  -AC     Additional Comments -- RTB  -AC            Exercise 5    Exercise Name 5 -- tband shoulder ER @90/90  -AC     Sets 5 -- 1`  -AC     Reps 5 -- 15  -AC     Additional Comments -- RTB  -AC            Exercise 6    Exercise Name 6 -- tband wall walks with YTB  -AC     Sets 6 -- 10  -AC     Additional Comments -- increased muscle fatigue. VCs for positioning  -AC            Exercise 7    Exercise Name 7 -- prone on elbow/QP reaches  -AC     Reps 7 -- 20  -AC            Exercise 8    Exercise Name 8 -- serratus punch  -AC     Sets 8 -- 3  -AC     Reps 8 -- 10  -AC     Additional Comments -- 2#  -AC           " User Key  (r) = Recorded By, (t) = Taken By, (c) = Cosigned By    Initials Name Provider Type    Gisela Mcdonald PT Physical Therapist                                PT OP Goals     Row Name 06/01/22 1100          PT Short Term Goals    STG Date to Achieve 05/11/22  -     STG 1 Pt is indpt with HEP.  -     STG 1 Progress Met  -     STG 2 Pt is able to complete AROM L shoulder without increased pain.  -     STG 2 Progress Met  -            Long Term Goals    LTG Date to Achieve 05/25/22  -     LTG 1 Pt demos improved L shoulder MMT to 4+/5 or better in all planes with minimal pain increase.  -     LTG 1 Progress Met  -     LTG 2 Pt demos minimal palpation tenderness along pecs, biceps, and middle trap/rhomboids.  -     LTG 2 Progress Met  -     LTG 3 QuickDASH improved to </= 10.  -     LTG 3 Progress Met  -     LTG 4 Pt notes overall subjee improvement 80% or better.  -     LTG 4 Progress Met  -     LTG 5 Pt is able to care for and  daughter without increased L shoulder pain.  -     LTG 5 Progress Met  -           User Key  (r) = Recorded By, (t) = Taken By, (c) = Cosigned By    Initials Name Provider Type    Gisela Mcdonald PT Physical Therapist                Therapy Education  Education Details: reviewed HEP added QP reaching, tband ER @ 90, and tband wall walks, and serratus punches  Given: HEP  Program: New, Reinforced, Progressed  How Provided: Verbal, Demonstration, Written  Provided to: Patient  Level of Understanding: Verbalized, Demonstrated    Quick DASH  Open a tight or new jar.: No Difficulty  Do heavy household chores (e.g., wash walls, wash floors): No Difficulty  Carry a shopping bag or briefcase: No Difficulty  Wash your back: No Difficulty  Use a knife to cut food: No Difficulty  Recreational activities in which you take some force or impact through your arm, should or hand (e.g. golf, hammering, tennis, etc.): No Difficulty  During the past  week, to what extent has your arm, shoulder, or hand problem interfered with your normal social activites with family, friends, neighbors or groups?: Not at all  During the past week, were you limited in your work or other regular daily activities as a result of your arm, shoulder or hand problem?: Not limited at all  Arm, Shoulder, or hand pain: None  Tingling (pins and needles) in your arm, shoulder, or hand: Mild  During the past week, how much difficulty have you had sleeping because of the pain in your arm, shoulder or hand?: No difficulty  Number of Questions Answered: 11  Quick DASH Score: 2.27         Time Calculation:   Start Time: 1123  Stop Time: 1202  Time Calculation (min): 39 min  Timed Charges  27435 - PT Therapeutic Exercise Minutes: 39  Total Minutes  Timed Charges Total Minutes: 39   Total Minutes: 39  Therapy Charges for Today     Code Description Service Date Service Provider Modifiers Qty    84964510986 HC PT THER PROC EA 15 MIN 6/1/2022 Gisela Dimas, PT GP 3          PT G-Codes  Quick DASH Score: 2.27     OP PT Discharge Summary  Date of Discharge: 06/01/22  Reason for Discharge: All goals achieved  Outcomes Achieved: Able to achieve all goals within established timeline  Discharge Destination: Home with home program      Gisela Dimas, PT  6/1/2022

## 2022-06-09 DIAGNOSIS — F41.9 ANXIETY: ICD-10-CM

## 2022-06-10 RX ORDER — DESVENLAFAXINE SUCCINATE 50 MG/1
50 TABLET, EXTENDED RELEASE ORAL DAILY
Qty: 30 TABLET | Refills: 0 | OUTPATIENT
Start: 2022-06-10

## 2022-06-27 ENCOUNTER — OFFICE VISIT (OUTPATIENT)
Dept: FAMILY MEDICINE CLINIC | Facility: CLINIC | Age: 42
End: 2022-06-27

## 2022-06-27 VITALS
BODY MASS INDEX: 42.38 KG/M2 | SYSTOLIC BLOOD PRESSURE: 134 MMHG | WEIGHT: 270 LBS | OXYGEN SATURATION: 99 % | HEART RATE: 80 BPM | HEIGHT: 67 IN | DIASTOLIC BLOOD PRESSURE: 82 MMHG | TEMPERATURE: 96.8 F

## 2022-06-27 DIAGNOSIS — E53.8 LOW SERUM VITAMIN B12: ICD-10-CM

## 2022-06-27 DIAGNOSIS — F41.9 ANXIETY: Primary | ICD-10-CM

## 2022-06-27 PROCEDURE — 99213 OFFICE O/P EST LOW 20 MIN: CPT | Performed by: NURSE PRACTITIONER

## 2022-06-27 PROCEDURE — 96372 THER/PROPH/DIAG INJ SC/IM: CPT | Performed by: NURSE PRACTITIONER

## 2022-06-27 RX ORDER — CYANOCOBALAMIN 1000 UG/ML
INJECTION, SOLUTION INTRAMUSCULAR; SUBCUTANEOUS
Qty: 1 ML | Refills: 6 | Status: SHIPPED | OUTPATIENT
Start: 2022-06-27 | End: 2023-01-04

## 2022-06-27 RX ORDER — DESVENLAFAXINE SUCCINATE 50 MG/1
50 TABLET, EXTENDED RELEASE ORAL DAILY
Qty: 90 TABLET | Refills: 1 | Status: SHIPPED | OUTPATIENT
Start: 2022-06-27 | End: 2023-01-10 | Stop reason: SDUPTHER

## 2022-06-27 RX ORDER — NEEDLES, SAFETY 18GX1 1/2"
NEEDLE, DISPOSABLE MISCELLANEOUS
Qty: 50 EACH | Refills: 0 | Status: SHIPPED | OUTPATIENT
Start: 2022-06-27 | End: 2023-01-04

## 2022-06-27 RX ORDER — CYANOCOBALAMIN 1000 UG/ML
1000 INJECTION, SOLUTION INTRAMUSCULAR; SUBCUTANEOUS
Status: SHIPPED | OUTPATIENT
Start: 2022-06-27

## 2022-06-27 RX ADMIN — CYANOCOBALAMIN 1000 MCG: 1000 INJECTION, SOLUTION INTRAMUSCULAR; SUBCUTANEOUS at 10:18

## 2022-06-27 NOTE — PROGRESS NOTES
Subjective   Maria E Red is a 41 y.o. female who presents to the office for anxiety follow-up.  Tells me that she can tell the Pristiq is working as she has been without it for a month.  Looking over her labs she had with endocrinology her TSH is normal, vitamin D is deficient and vit B12 is low as well.  Says that she is losing her hair just in the last two or three weeks.  History of Present Illness     The following portions of the patient's history were reviewed and updated as appropriate: allergies, current medications, past family history, past medical history, past social history, past surgical history and problem list.    Review of Systems   Constitutional: Negative for chills, fatigue and fever.   HENT: Negative for congestion, sneezing, sore throat and trouble swallowing.    Eyes: Negative for visual disturbance.   Respiratory: Negative for cough, chest tightness, shortness of breath and wheezing.    Cardiovascular: Negative for chest pain, palpitations and leg swelling.   Gastrointestinal: Negative for abdominal pain, constipation, diarrhea, nausea and vomiting.   Genitourinary: Negative for dysuria, frequency and urgency.   Musculoskeletal: Negative for neck pain.   Skin: Negative for rash.   Neurological: Negative for dizziness, weakness and headaches.   Psychiatric/Behavioral: The patient is nervous/anxious.         In the past two weeks the pt has not felt down, depressed, hopeless or lost interest in doing things   All other systems reviewed and are negative.      Objective   Physical Exam  Vitals and nursing note reviewed.   Constitutional:       General: She is not in acute distress.     Appearance: She is well-developed. She is not ill-appearing.   HENT:      Head: Normocephalic and atraumatic.      Right Ear: External ear normal.      Left Ear: External ear normal.      Nose: Nose normal.   Eyes:      General: No scleral icterus.        Right eye: No discharge.         Left eye: No  discharge.      Conjunctiva/sclera: Conjunctivae normal.      Pupils: Pupils are equal, round, and reactive to light.   Neck:      Thyroid: No thyromegaly.   Cardiovascular:      Rate and Rhythm: Normal rate and regular rhythm.      Heart sounds: Normal heart sounds. No murmur heard.    No friction rub. No gallop.   Pulmonary:      Effort: Pulmonary effort is normal. No respiratory distress.      Breath sounds: Normal breath sounds. No wheezing or rales.   Abdominal:      General: Bowel sounds are normal. There is no distension.      Palpations: Abdomen is soft.      Tenderness: There is no abdominal tenderness. There is no guarding or rebound.   Musculoskeletal:         General: Normal range of motion.      Cervical back: Normal range of motion and neck supple.   Lymphadenopathy:      Cervical: No cervical adenopathy.   Skin:     General: Skin is warm and dry.      Findings: No rash.   Neurological:      Mental Status: She is alert and oriented to person, place, and time.      GCS: GCS eye subscore is 4. GCS verbal subscore is 5. GCS motor subscore is 6.      Cranial Nerves: Cranial nerves are intact. No cranial nerve deficit.      Sensory: Sensation is intact.      Motor: Motor function is intact.      Coordination: Coordination is intact.      Gait: Gait is intact.   Psychiatric:         Attention and Perception: Attention and perception normal.         Mood and Affect: Mood and affect normal.         Speech: Speech normal.         Behavior: Behavior normal. Behavior is cooperative.         Thought Content: Thought content normal. Thought content does not include homicidal or suicidal ideation. Thought content does not include homicidal or suicidal plan.         Cognition and Memory: Cognition and memory normal.         Judgment: Judgment normal.      Comments: Dressed appropriately for weather and situation, makes eye contact and engages in conversation         Assessment & Plan   Diagnoses and all orders for  "this visit:    1. Anxiety (Primary)  -     desvenlafaxine (Pristiq) 50 MG 24 hr tablet; Take 1 tablet by mouth Daily.  Dispense: 90 tablet; Refill: 1    2. Low serum vitamin B12  -     cyanocobalamin injection 1,000 mcg    Other orders  -     cyanocobalamin 1000 MCG/ML injection; Inject 1 mL under the appropriate area of skin once weekly for the next three weeks then monthly thereafter.  Dispense: 1 mL; Refill: 6  -     Syringe/Needle, Disp, (BD Eclipse Syringe) 25G X 1\" 3 ML misc; Use to administer B12 injections.  Dispense: 50 each; Refill: 0    Encouraged to continue with medications as ordered.    I am also going to start her on B12 injections, the first being given here in the office.  Will be weekly for three weeks then monthly, she says that she has someone that can give it to her.  Suspect the hair loss is from not being on the Pristiq.  Will reassess her symptoms in a few months.               This document has been electronically signed by KIERRA Oneal on June 27, 2022 13:06 CDT    "

## 2022-07-15 ENCOUNTER — DOCUMENTATION (OUTPATIENT)
Dept: ENDOCRINOLOGY | Facility: CLINIC | Age: 42
End: 2022-07-15

## 2022-07-15 NOTE — PROGRESS NOTES
Specialty Pharmacy Refill Coordination Note     Maria E is a 41 y.o. female contacted today regarding refills of  ozempic specialty medication(s).    Reviewed and verified with patient:  No refills are needed at this time, she is home sick as well as the family and she will contact us in a couple of weeks.       Specialty medication(s) and dose(s) confirmed: n/a                   Follow-up: As needed.      Domenico Shelton  Specialty Pharmacy Technician

## 2022-08-16 ENCOUNTER — SPECIALTY PHARMACY (OUTPATIENT)
Dept: PHARMACY | Facility: HOSPITAL | Age: 42
End: 2022-08-16

## 2022-08-16 NOTE — PROGRESS NOTES
Specialty Pharmacy Refill Coordination Note     Maria E is a 41 y.o. female contacted today regarding refills of  ozempic specialty medication(s).    Reviewed and verified with patient:       Specialty medication(s) and dose(s) confirmed: yes    Refill Questions    Flowsheet Row Most Recent Value   Changes to allergies? No   Changes to medications? No   Unplanned office visit, urgent care, ED, or hospital admission in the last 4 weeks  No   How does patient/caregiver feel medication is working? Very good   Financial problems or insurance changes  No   Since the previous refill, were any specialty medication doses or scheduled injections missed or delayed?  No   Does this patient require a clinical escalation to a pharmacist? No          Delivery Questions    Flowsheet Row Most Recent Value   Is there any medication that is due not being filled? Yes   Medication that does not need to be filled at this time metformin, cyanocobalamin 1000mcg inj   Why are other medications not being filled? patient got injection from office and has plenty metformin   Do any medications need mixed or dated? No   Questions or concerns for the pharmacist? No   Are any medications first time fills? No                 Follow-up: 3 month(s)     Priscila Shah, Pharmacy Technician  Specialty Pharmacy Technician

## 2022-08-30 ENCOUNTER — SPECIALTY PHARMACY (OUTPATIENT)
Dept: ENDOCRINOLOGY | Facility: CLINIC | Age: 42
End: 2022-08-30

## 2022-08-30 NOTE — PROGRESS NOTES
Specialty Pharmacy Refill Coordination Note     Maria E is a 42 y.o. female contacted today regarding refills of  ozempic  specialty medication(s).    Reviewed and verified with patient:       Specialty medication(s) and dose(s) confirmed: yes                   Follow-up: 3 month(s)     Priscila Sahh, Pharmacy Technician  Specialty Pharmacy Technician

## 2022-11-03 ENCOUNTER — SPECIALTY PHARMACY (OUTPATIENT)
Dept: ONCOLOGY | Facility: CLINIC | Age: 42
End: 2022-11-03

## 2022-11-03 NOTE — PROGRESS NOTES
Specialty Pharmacy Refill Coordination Note     Maria E is a 42 y.o. female contacted today regarding refills of  ozempic specialty medication(s).    Reviewed and verified with patient:       Specialty medication(s) and dose(s) confirmed: yes    Refill Questions    Flowsheet Row Most Recent Value   Changes to allergies? No   Changes to medications? No   New conditions since last clinic visit No   Unplanned office visit, urgent care, ED, or hospital admission in the last 4 weeks  No   How does patient/caregiver feel medication is working? Good   Financial problems or insurance changes  No   Since the previous refill, were any specialty medication doses or scheduled injections missed or delayed?  No   Does this patient require a clinical escalation to a pharmacist? No          Delivery Questions    Flowsheet Row Most Recent Value   Delivery method  at Pharmacy                 Follow-up: 30 day(s)     Priscila Shah, Pharmacy Technician  Specialty Pharmacy Technician

## 2022-11-17 ENCOUNTER — SPECIALTY PHARMACY (OUTPATIENT)
Dept: ENDOCRINOLOGY | Facility: CLINIC | Age: 42
End: 2022-11-17

## 2022-11-17 NOTE — PROGRESS NOTES
Specialty Pharmacy Patient Management Program  Endocrinology Reassessment     Maria E Red is a 42 y.o. female with Type 2 Diabetes seen by an Endocrinology provider and enrolled in the Endocrinology Patient Management program offered by [facility].  A follow-up outreach was conducted, including assessment of continued therapy appropriateness, medication adherence, and side effect incidence and management for Ozempic.    Changes to Insurance Coverage or Financial Support  [place]     Relevant Past Medical History and Comorbidities  Relevant medical history and concomitant health conditions were discussed with the patient. The patient's chart has been reviewed for relevant past medical history and comorbid health conditions and updated as necessary.   Past Medical History:   Diagnosis Date   • Anxiety    • Anxiety and depression    • Arthritis    • Atypical squamous cells of undetermined significance (ASC-US) on cervical Pap smear    • Back pain     back spasms and leg spasms   • Backache    • Bronchitis    • Constipation    • Depression    • Dysplasia of cervix     stage 2   • Encounter for  visit    • Encounter for routine gynecological examination    • Epigastric pain    • Exanthematous disorder    • GERD (gastroesophageal reflux disease)    • HPV (human papilloma virus) infection    • Hypertensive disorder     resolved (with pregnancy)   • IBS (irritable bowel syndrome)    • Low back pain    • Migraine    • Obesity    • Pap smear abnormality of cervix     grade 2 dysplagia   • PCOS (polycystic ovarian syndrome)    • Polycystic ovaries    • Psoriasis    • Psychogenic pruritus    • Surgery follow-up    • Wears glasses      Social History     Socioeconomic History   • Marital status:    Tobacco Use   • Smoking status: Former     Types: Cigarettes     Quit date: 10/1/2016     Years since quittin.1   • Smokeless tobacco: Never   Substance and Sexual Activity   •  Alcohol use: Yes     Comment: occational    • Drug use: No   • Sexual activity: Yes     Partners: Male          Allergies  Known allergies and reactions were discussed with the patient. The patient's chart has been reviewed for allergy information and updated as necessary.   Codeine and Dilaudid [hydromorphone hcl]       Relevant Laboratory Values  A1C Last 3 Results    HGBA1C Last 3 Results 4/21/22   Hemoglobin A1C 5.60           Lab Results   Component Value Date    HGBA1C 5.60 04/21/2022     Lab Results   Component Value Date    GLUCOSE 89 04/21/2022    CALCIUM 8.4 (L) 04/21/2022     04/21/2022    K 3.8 04/21/2022    CO2 23.0 04/21/2022     04/21/2022    BUN 11 04/21/2022    CREATININE 0.77 04/21/2022    EGFRIFNONA 96 08/19/2021    BCR 14.3 04/21/2022    ANIONGAP 15.0 04/21/2022     Lab Results   Component Value Date    CHOL 151 11/02/2020    TRIG 72 11/02/2020    HDL 34 (L) 11/02/2020     (H) 11/02/2020       Current Medication List  This medication list has been reviewed with the patient and evaluated for any interactions or necessary modifications/recommendations, and updated to include all prescription medications, OTC medications, and supplements the patient is currently taking.  This list reflects what is contained in the patient's profile, which has also been marked as reviewed to communicate to other providers it is the most up to date version of the patient's current medication therapy.     Current Outpatient Medications:   •  cetirizine (zyrTEC) 10 MG tablet, Take 10 mg by mouth Every Night., Disp: , Rfl:   •  clobetasol (TEMOVATE) 0.05 % external solution, Apply topically to the appropriate area of scalp as directed 2 (two) times a day., Disp: 50 mL, Rfl: 11  •  clobetasol (TEMOVATE) 0.05 % ointment, Apply to the appropriate area on the body as directed 2 (two) times daily. Limit duration to 2 week periods. Do NOT apply to face, groin, or folds., Disp: 60 g, Rfl: 5  •  cyanocobalamin  "1000 MCG/ML injection, Inject 1 mL under the appropriate area of skin once weekly for the next three weeks then monthly thereafter., Disp: 1 mL, Rfl: 6  •  desvenlafaxine (Pristiq) 50 MG 24 hr tablet, Take 1 tablet by mouth Daily., Disp: 90 tablet, Rfl: 1  •  melatonin 3 MG tablet, Take 1.5 mg by mouth every night at bedtime., Disp: , Rfl:   •  metFORMIN ER (Glucophage XR) 500 MG 24 hr tablet, Take 2 tablets by mouth twice daily with meals. (Patient taking differently: Take 2 tablets by mouth twice daily with meals.), Disp: 360 tablet, Rfl: 3  •  Multiple Vitamins-Minerals (MULTIVITAMIN ADULT PO), Take 1 tablet by mouth Daily., Disp: , Rfl:   •  omeprazole (priLOSEC) 20 MG capsule, Take 1 capsule by mouth Every Night., Disp: 90 capsule, Rfl: 1  •  Semaglutide, 1 MG/DOSE, (Ozempic, 1 MG/DOSE,) 4 MG/3ML solution pen-injector, Inject 1 mg under the skin into the appropriate area as directed 1 (One) Time Per Week., Disp: 9 mL, Rfl: 3  •  Syringe/Needle, Disp, (BD Eclipse Syringe) 25G X 1\" 3 ML misc, Use to administer B12 injections., Disp: 50 each, Rfl: 0  •  vitamin D3 125 MCG (5000 UT) capsule capsule, Take 5,000 Units by mouth Daily., Disp: , Rfl:     Current Facility-Administered Medications:   •  cyanocobalamin injection 1,000 mcg, 1,000 mcg, Intramuscular, Q28 Days, Rachel Pierre APRN, 1,000 mcg at 06/27/22 1018       Drug Interactions  None noted     Adverse Drug Reactions  • Adverse Reactions Experienced: Side effects with Metformin - clammy, sweaty, weak    • Plan for ADR Management: Decrease metformin to 1 tab BID     Hospitalizations and Urgent Care Since Last Assessment  • Hospitalizations or Admissions: None  • ED Visits: None   • Urgent Office Visits: None    Adherence and Self-Administration  Adherence related patient's specialty therapy was discussed with the patient. The Adherence segment of this outreach has been reviewed and updated.     • Ongoing or New Barriers to Patient Self-Administration: " None  • Methods for Supporting Patient Self-Administration: None     Goals of Therapy  Goals related to the patient's specialty therapy was discussed with the patient. The Patient Goals segment of this outreach has been reviewed and updated.    Goals     •  Specialty Pharmacy General Goal (pt-stated)       Eventually tolerate 2 mg dose of ozempic           Still at 1 mg of ozempic; wants to increase to 2 mg dose. Will follow up in a few weeks.   Quality of Life Assessment   Quality of Life related to the patient's specialty therapy was discussed with the patient. The QOL segment of this outreach has been reviewed and updated.      Reassessment Plan & Follow-Up  1. Medication Therapy Changes: Continue Ozempic 1 mg weekly. Decrease Metformin to 1 tab BID due to side effects.  2. Additional Plans, Therapy Recommendations, or Therapy Problems to Be Addressed: None  3. Pharmacist to perform regular reassessments no more than (6) months from the previous assessment.  4. Care Coordinator to set up future refill outreaches, coordinate prescription delivery, and escalate clinical questions to pharmacist.     Attestation  I attest that the specialty medication(s) addressed above are appropriate for the patient based on my reassessment.  If the prescribed therapy is at any point deemed not appropriate based on the current or future assessments, a consultation will be initiated with the patient's specialty care provider to determine the best course of action. The revised plan of therapy will be documented along with any additional patient education provided.     Lauryn Villalobos, PharmD  11/17/2022  15:44 CST

## 2022-12-21 ENCOUNTER — SPECIALTY PHARMACY (OUTPATIENT)
Dept: ENDOCRINOLOGY | Facility: CLINIC | Age: 42
End: 2022-12-21

## 2022-12-21 RX ORDER — SEMAGLUTIDE 2.68 MG/ML
2 INJECTION, SOLUTION SUBCUTANEOUS WEEKLY
Qty: 3 ML | Refills: 11 | Status: SHIPPED | OUTPATIENT
Start: 2022-12-21

## 2022-12-22 ENCOUNTER — SPECIALTY PHARMACY (OUTPATIENT)
Dept: ENDOCRINOLOGY | Facility: CLINIC | Age: 42
End: 2022-12-22

## 2023-01-04 ENCOUNTER — LAB (OUTPATIENT)
Dept: LAB | Facility: OTHER | Age: 43
End: 2023-01-04
Payer: COMMERCIAL

## 2023-01-04 ENCOUNTER — OFFICE VISIT (OUTPATIENT)
Dept: OBSTETRICS AND GYNECOLOGY | Facility: CLINIC | Age: 43
End: 2023-01-04
Payer: COMMERCIAL

## 2023-01-04 VITALS
SYSTOLIC BLOOD PRESSURE: 110 MMHG | DIASTOLIC BLOOD PRESSURE: 72 MMHG | HEIGHT: 67 IN | HEART RATE: 77 BPM | BODY MASS INDEX: 40.68 KG/M2 | WEIGHT: 259.2 LBS

## 2023-01-04 DIAGNOSIS — E28.2 PCOS (POLYCYSTIC OVARIAN SYNDROME): ICD-10-CM

## 2023-01-04 DIAGNOSIS — Z12.31 SCREENING MAMMOGRAM, ENCOUNTER FOR: ICD-10-CM

## 2023-01-04 DIAGNOSIS — Z01.419 ENCOUNTER FOR GYNECOLOGICAL EXAMINATION WITH PAPANICOLAOU SMEAR OF CERVIX: Primary | ICD-10-CM

## 2023-01-04 PROCEDURE — 99396 PREV VISIT EST AGE 40-64: CPT | Performed by: NURSE PRACTITIONER

## 2023-01-06 LAB — REF LAB TEST METHOD: NORMAL

## 2023-01-06 NOTE — PROGRESS NOTES
Subjective   Chief Complaint   Patient presents with   • Gynecologic Exam     WWLISSY Red is a 42 y.o. year old  presenting to be seen for her annual exam.  Today she has no concerns.  Describes a now regular menstrual cycle in the last couple of years. Pt states she was diagnosed with PCOS as a teenager. Has had amenorrhea most of her life unless on OCPs. She notes she has had 2-3 EMBs for amenorrhea and 3 LEEP procedures due to abnormal pap smears. Pt is not using any form of contraceptive but does not desire any more children. She states she is rarely sexually active. But voices understanding that having a regular cycle means she is most likely ovulating and needs to prevent pregnancy when she is.     Last pap 21 neg, no cotesting was done by PCP. Prior to that, it was abnormal in 2018 requiring LEEP for MARILEE 2.     Patient's last menstrual period was 2022 (exact date).    OB History        2    Para   1    Term   1            AB   1    Living   1       SAB   1    IAB        Ectopic        Molar        Multiple        Live Births                    Current birth control method: none.    She is sexually active.  In the past 12 months there has not been new sexual partners.  Condoms are not typically used.  She would not like to be screened for STD's at today's exam.     Past 6 month menstrual history:    Cycle Frequency: regular, predictable and consistent every 28 - 32 days   Menstrual cycle character: flow is typically normal   Cycle Duration: 2-4   Number of heavy days of flows: 0   Dysmenorrhea: mild and is not affecting her activities of daily living   PMS: none   Intermenstrual bleeding present: no   Post-coital bleeding present: no     She exercises regularly: yes.  She wears her seat belt:yes.  She has concerns about domestic violence: no.  Last colonoscopy: , IBS  Last DEXA: Never  Last MM22  Last pap: 21  History of abnormal PAP: Yes,  numerous, requiring LEEPs x 3     The following portions of the patient's history were reviewed and updated as appropriate:problem list, current medications, allergies, past family history, past medical history, past social history and past surgical history.    Social History    Tobacco Use      Smoking status: Former        Types: Cigarettes        Quit date: 10/1/2016        Years since quittin.2      Smokeless tobacco: Never    Social History     Substance and Sexual Activity   Alcohol Use Yes    Comment: occational       Review of Systems   Constitutional: Negative.  Negative for chills and fever. Unexpected weight change: intentional weight loss.   Respiratory: Negative.    Cardiovascular: Negative.    Gastrointestinal: Negative for abdominal pain, nausea and vomiting.        IBS   Endocrine: Negative.  Negative for cold intolerance and heat intolerance.        PCOS   Genitourinary: Negative for dyspareunia, dysuria, frequency, menstrual problem, vaginal bleeding and vaginal discharge.   Skin: Negative.    Neurological: Negative for dizziness, syncope, light-headedness and headaches.   Psychiatric/Behavioral: Negative for suicidal ideas.        Anxiety and depression currently managed         Objective   /72   Pulse 77   Ht 170.2 cm (67\")   Wt 118 kg (259 lb 3.2 oz)   LMP 2022 (Exact Date)   Breastfeeding No   BMI 40.60 kg/m²     General:  well developed; well nourished  no acute distress  obese - Body mass index is 40.6 kg/m².   Skin:  No suspicious lesions seen   Thyroid: not examined   Breasts:  Examined in supine position  Symmetric without masses or skin dimpling  Nipples normal without inversion, lesions or discharge  There are no palpable axillary nodes  Fibrocystic changes are present both breasts without a discrete mass   Abdomen: soft, non-tender; no masses  no umbilical or inginual hernias are present  no hepato-splenomegaly  Normal findings: bowel sounds normal   Cardiac: Heart  sounds are normal.  Regular rate and rhythm without murmur, gallop or rub.   Resp: Normal expansion.  Clear to auscultation.  No rales, rhonchi, or wheezing.   Psych: alert,oriented, in NAD with a full range of affect, normal behavior and no psychotic features   Pelvis: Uterus:  Clinical staff was present for exam  External genitalia:  normal appearance of the external genitalia including Bartholin's and [place]'s glands.  :  urethral meatus normal;  Vaginal:  normal pink mucosa without prolapse or lesions and blood present -  scant, brown spotting   Cervix:  normal appearance.  Uterus:  normal size, shape and consistency  Adnexa:  normal bimanual exam of the adnexa.     Lab Review   previous pap test, colpo and LEEP results    Imaging  Mammogram report       Diagnoses and all orders for this visit:    1. Encounter for gynecological examination with Papanicolaou smear of cervix (Primary)  -     LIQUID-BASED PAP SMEAR, [facility] ([facility])    2. Screening mammogram, encounter for  -     Mammo Screening Digital Tomosynthesis Bilateral With CAD; Future    3. PCOS (polycystic ovarian syndrome)      Pap results: I will send card in mail or call if abnormal. RTC annually for WWE or sooner referral for colpo PRN pending results. Pt voices understanding.     SBE encouraged monthly. MMG annually. Due after 1/11/23. Order placed and pt scheduled for 1/27/23. DEXA and colonoscopy not indicated.     She does not use any form of contraceptive. I cautioned pt that fertility has likely increased now with regular cycles. Strongly recommend condom use when sexually active if declining hormonal options. Pt voices understanding. Reminded pt of the need for a period q 3 months if amenorrhea recurs. She understands that prolonged amenorrhea could result in hyperplasia and endometrial cancer. She will consider cyclic provera PRN in the future.     Praised for weight loss efforts and encouraged continued measures. She is up to date with  PCP and endocrinology.     This note was electronically signed.    Marni Lang, APRN  January 6, 2023

## 2023-01-10 DIAGNOSIS — F41.9 ANXIETY: ICD-10-CM

## 2023-01-10 RX ORDER — DESVENLAFAXINE SUCCINATE 50 MG/1
50 TABLET, EXTENDED RELEASE ORAL DAILY
Qty: 30 TABLET | Refills: 0 | Status: SHIPPED | OUTPATIENT
Start: 2023-01-10 | End: 2023-02-21 | Stop reason: SDUPTHER

## 2023-01-13 RX ORDER — CLOBETASOL PROPIONATE 0.5 MG/G
OINTMENT TOPICAL
Qty: 60 G | Refills: 5 | Status: SHIPPED | OUTPATIENT
Start: 2023-01-13

## 2023-01-13 RX ORDER — CLOBETASOL PROPIONATE 0.46 MG/ML
SOLUTION TOPICAL
Qty: 50 ML | Refills: 11 | Status: SHIPPED | OUTPATIENT
Start: 2023-01-13

## 2023-01-18 ENCOUNTER — OFFICE VISIT (OUTPATIENT)
Dept: FAMILY MEDICINE CLINIC | Facility: CLINIC | Age: 43
End: 2023-01-18
Payer: COMMERCIAL

## 2023-01-18 VITALS
WEIGHT: 257 LBS | BODY MASS INDEX: 40.34 KG/M2 | HEART RATE: 86 BPM | OXYGEN SATURATION: 99 % | HEIGHT: 67 IN | DIASTOLIC BLOOD PRESSURE: 102 MMHG | SYSTOLIC BLOOD PRESSURE: 156 MMHG

## 2023-01-18 DIAGNOSIS — Z11.59 ENCOUNTER FOR HEPATITIS C SCREENING TEST FOR LOW RISK PATIENT: ICD-10-CM

## 2023-01-18 DIAGNOSIS — R53.82 CHRONIC FATIGUE: ICD-10-CM

## 2023-01-18 DIAGNOSIS — I10 ESSENTIAL HYPERTENSION: ICD-10-CM

## 2023-01-18 DIAGNOSIS — R59.9 LYMPH NODE ENLARGEMENT: ICD-10-CM

## 2023-01-18 DIAGNOSIS — Z00.00 ANNUAL PHYSICAL EXAM: Primary | ICD-10-CM

## 2023-01-18 DIAGNOSIS — E53.8 VITAMIN B12 DEFICIENCY: ICD-10-CM

## 2023-01-18 DIAGNOSIS — E55.9 VITAMIN D DEFICIENCY: ICD-10-CM

## 2023-01-18 PROCEDURE — 99214 OFFICE O/P EST MOD 30 MIN: CPT | Performed by: NURSE PRACTITIONER

## 2023-01-18 RX ORDER — LISINOPRIL 20 MG/1
20 TABLET ORAL DAILY
Qty: 90 TABLET | Refills: 0 | Status: SHIPPED | OUTPATIENT
Start: 2023-01-18

## 2023-01-18 NOTE — PROGRESS NOTES
"Subjective   Maria E Red is a 42 y.o. female who presents to the office for follow-up. She has complaints of chronic fatigue. Requesting Vitamin B12 injections. Currently taking vitamin D supplements. 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 BP elevated in the office today 156/102. Not currently taking medications for blood pressure. Reports she did have to take HCTZ after one of her pregnancies due to preeclampsia but was taken off after she began experiencing low blood pressure. She has not been monitoring BP at home but denies hypertensive symptoms. She states diet and exercise habits \"have room for improvement\".  Reports anxiety/depression doing well with Pristiq. She also reports a swollen lymph node behind her L ear. States she can always feel it but some days are more swollen than others, it is sometimes painful when its enlarged. She takes Zyrtec daily and Flonase PRN.   History of Present Illness     The following portions of the patient's history were reviewed and updated as appropriate: allergies, current medications, past family history, past medical history, past social history, past surgical history and problem list.    Review of Systems   Constitutional: Negative.    HENT:        Swollen lymph node behind L ear   Eyes: Negative.    Respiratory: Negative for chest tightness and shortness of breath.    Cardiovascular: Negative.  Negative for chest pain.   Gastrointestinal: Negative.    Endocrine: Negative.    Genitourinary: Negative.    Musculoskeletal: Negative.    Skin: Negative.    Allergic/Immunologic: Negative.    Neurological: Negative.  Negative for light-headedness and headaches.   Hematological: Negative.    Psychiatric/Behavioral: Negative.    All other systems reviewed and are negative.      Objective   Physical Exam  Constitutional:       General: She is not in acute distress.     Appearance: Normal appearance. She is not " ill-appearing.   HENT:      Head: Normocephalic.      Right Ear: External ear normal.      Left Ear: External ear normal.      Nose: Nose normal.      Mouth/Throat:      Mouth: Mucous membranes are moist.      Pharynx: Oropharynx is clear.   Eyes:      Extraocular Movements: Extraocular movements intact.      Conjunctiva/sclera: Conjunctivae normal.      Pupils: Pupils are equal, round, and reactive to light.   Cardiovascular:      Rate and Rhythm: Normal rate and regular rhythm.      Pulses: Normal pulses.      Heart sounds: Normal heart sounds. No murmur heard.    No friction rub. No gallop.   Pulmonary:      Effort: Pulmonary effort is normal. No respiratory distress.      Breath sounds: Normal breath sounds. No stridor. No wheezing, rhonchi or rales.   Chest:      Chest wall: No tenderness.   Abdominal:      General: Abdomen is flat. Bowel sounds are normal.      Palpations: Abdomen is soft.   Musculoskeletal:         General: Normal range of motion.   Lymphadenopathy:      Head:      Left side of head: Occipital (Single node that is just barely palpable, do not perceive it to be inflammatory) adenopathy present.      Cervical: Cervical adenopathy present.   Skin:     General: Skin is warm and dry.      Capillary Refill: Capillary refill takes less than 2 seconds.   Neurological:      General: No focal deficit present.      Mental Status: She is alert and oriented to person, place, and time. Mental status is at baseline.      GCS: GCS eye subscore is 4. GCS verbal subscore is 5. GCS motor subscore is 6.      Cranial Nerves: Cranial nerves 2-12 are intact.   Psychiatric:         Mood and Affect: Mood normal.         Behavior: Behavior normal. Behavior is cooperative.         Thought Content: Thought content normal.         Judgment: Judgment normal.         Assessment & Plan   Diagnoses and all orders for this visit:    1. Annual physical exam (Primary)  -     CBC w AUTO Differential; Future  -     Comprehensive  metabolic panel; Future  -     TSH; Future  -     T4, free; Future  -     Iron and TIBC; Future  -     Ferritin; Future  -     Folate; Future  -     Lipid panel; Future  -     Vitamin D 25 Hydroxy; Future  -     Vitamin B12; Future  -     Hepatitis C antibody; Future    2. Essential hypertension  -     CBC w AUTO Differential; Future  -     Comprehensive metabolic panel; Future  -     TSH; Future  -     T4, free; Future  -     Iron and TIBC; Future  -     Ferritin; Future  -     Folate; Future  -     Lipid panel; Future    3. Chronic fatigue  -     CBC w AUTO Differential; Future  -     Comprehensive metabolic panel; Future  -     TSH; Future  -     T4, free; Future  -     Iron and TIBC; Future  -     Ferritin; Future  -     Folate; Future  -     Lipid panel; Future  -     Vitamin D 25 Hydroxy; Future  -     Vitamin B12; Future  -     Hepatitis C antibody; Future    4. Vitamin B12 deficiency  -     Folate; Future  -     Vitamin B12; Future    5. Vitamin D deficiency  -     Vitamin D 25 Hydroxy; Future    6. Encounter for hepatitis C screening test for low risk patient  -     Hepatitis C antibody; Future    7. Lymph node enlargement    Other orders  -     lisinopril (PRINIVIL,ZESTRIL) 20 MG tablet; Take 1 tablet by mouth Daily.  Dispense: 90 tablet; Refill: 0    Patient is to return for fasting labs at her earliest convenience. Will notify her of results over the phone and any need for additional orders pending results. Continue taking current medications as ordered. Start taking Lisinopril daily for hypertension and she is instructed to monitor BP daily. Monitor lymph node swelling and continue Zyrtec and Flonase, return if enlarged painful for US. Follow-up in 6-months or sooner if needed. Pt verbalized understanding and is agreeable to plan. No further questions.  Cardiac diet.  Exercise.           This document has been electronically signed by KIERRA Oneal on January 18, 2023 13:43 CST

## 2023-01-20 ENCOUNTER — LAB (OUTPATIENT)
Dept: LAB | Facility: OTHER | Age: 43
End: 2023-01-20
Payer: COMMERCIAL

## 2023-01-20 DIAGNOSIS — Z00.00 ANNUAL PHYSICAL EXAM: ICD-10-CM

## 2023-01-20 DIAGNOSIS — R53.82 CHRONIC FATIGUE: ICD-10-CM

## 2023-01-20 DIAGNOSIS — I10 ESSENTIAL HYPERTENSION: ICD-10-CM

## 2023-01-20 DIAGNOSIS — Z11.59 ENCOUNTER FOR HEPATITIS C SCREENING TEST FOR LOW RISK PATIENT: ICD-10-CM

## 2023-01-20 DIAGNOSIS — E55.9 VITAMIN D DEFICIENCY: ICD-10-CM

## 2023-01-20 DIAGNOSIS — E28.2 PCOS (POLYCYSTIC OVARIAN SYNDROME): ICD-10-CM

## 2023-01-20 DIAGNOSIS — E16.1 HYPERINSULINEMIA: ICD-10-CM

## 2023-01-20 DIAGNOSIS — E53.8 VITAMIN B12 DEFICIENCY: ICD-10-CM

## 2023-01-20 LAB
ALBUMIN SERPL-MCNC: 4.2 G/DL (ref 3.5–5)
ALBUMIN/GLOB SERPL: 1.4 G/DL (ref 1.1–1.8)
ALP SERPL-CCNC: 67 U/L (ref 38–126)
ALT SERPL W P-5'-P-CCNC: 35 U/L
ANION GAP SERPL CALCULATED.3IONS-SCNC: 8 MMOL/L (ref 5–15)
AST SERPL-CCNC: 30 U/L (ref 14–36)
BASOPHILS # BLD AUTO: 0.02 10*3/MM3 (ref 0–0.2)
BASOPHILS NFR BLD AUTO: 0.2 % (ref 0–1.5)
BILIRUB SERPL-MCNC: 1.2 MG/DL (ref 0.2–1.3)
BUN SERPL-MCNC: 11 MG/DL (ref 7–23)
BUN/CREAT SERPL: 19.6 (ref 7–25)
CALCIUM SPEC-SCNC: 8.8 MG/DL (ref 8.4–10.2)
CHLORIDE SERPL-SCNC: 102 MMOL/L (ref 101–112)
CHOLEST SERPL-MCNC: 147 MG/DL (ref 150–200)
CO2 SERPL-SCNC: 24 MMOL/L (ref 22–30)
CREAT SERPL-MCNC: 0.56 MG/DL (ref 0.52–1.04)
DEPRECATED RDW RBC AUTO: 42 FL (ref 37–54)
EGFRCR SERPLBLD CKD-EPI 2021: 117.8 ML/MIN/1.73
EOSINOPHIL # BLD AUTO: 0.08 10*3/MM3 (ref 0–0.4)
EOSINOPHIL NFR BLD AUTO: 0.9 % (ref 0.3–6.2)
ERYTHROCYTE [DISTWIDTH] IN BLOOD BY AUTOMATED COUNT: 13 % (ref 12.3–15.4)
GLOBULIN UR ELPH-MCNC: 2.9 GM/DL (ref 2.3–3.5)
GLUCOSE SERPL-MCNC: 84 MG/DL (ref 70–99)
HBA1C MFR BLD: 5.2 % (ref 4.8–5.6)
HCT VFR BLD AUTO: 38.4 % (ref 34–46.6)
HDLC SERPL-MCNC: 43 MG/DL (ref 40–59)
HGB BLD-MCNC: 13.3 G/DL (ref 12–15.9)
LDLC SERPL CALC-MCNC: 87 MG/DL
LDLC/HDLC SERPL: 2 {RATIO} (ref 0–3.22)
LYMPHOCYTES # BLD AUTO: 3.56 10*3/MM3 (ref 0.7–3.1)
LYMPHOCYTES NFR BLD AUTO: 38.2 % (ref 19.6–45.3)
MCH RBC QN AUTO: 31.1 PG (ref 26.6–33)
MCHC RBC AUTO-ENTMCNC: 34.6 G/DL (ref 31.5–35.7)
MCV RBC AUTO: 89.7 FL (ref 79–97)
MONOCYTES # BLD AUTO: 0.58 10*3/MM3 (ref 0.1–0.9)
MONOCYTES NFR BLD AUTO: 6.2 % (ref 5–12)
NEUTROPHILS NFR BLD AUTO: 5.08 10*3/MM3 (ref 1.7–7)
NEUTROPHILS NFR BLD AUTO: 54.5 % (ref 42.7–76)
PLATELET # BLD AUTO: 224 10*3/MM3 (ref 140–450)
PMV BLD AUTO: 9.8 FL (ref 6–12)
POTASSIUM SERPL-SCNC: 3.9 MMOL/L (ref 3.4–5)
PROT SERPL-MCNC: 7.1 G/DL (ref 6.3–8.6)
RBC # BLD AUTO: 4.28 10*6/MM3 (ref 3.77–5.28)
SODIUM SERPL-SCNC: 134 MMOL/L (ref 137–145)
TRIGL SERPL-MCNC: 91 MG/DL
VLDLC SERPL-MCNC: 17 MG/DL (ref 5–40)
WBC NRBC COR # BLD: 9.32 10*3/MM3 (ref 3.4–10.8)

## 2023-01-20 PROCEDURE — 82043 UR ALBUMIN QUANTITATIVE: CPT | Performed by: INTERNAL MEDICINE

## 2023-01-20 PROCEDURE — 84439 ASSAY OF FREE THYROXINE: CPT | Performed by: NURSE PRACTITIONER

## 2023-01-20 PROCEDURE — 82570 ASSAY OF URINE CREATININE: CPT | Performed by: INTERNAL MEDICINE

## 2023-01-20 PROCEDURE — 82607 VITAMIN B-12: CPT | Performed by: NURSE PRACTITIONER

## 2023-01-20 PROCEDURE — 80061 LIPID PANEL: CPT | Performed by: NURSE PRACTITIONER

## 2023-01-20 PROCEDURE — 83540 ASSAY OF IRON: CPT | Performed by: NURSE PRACTITIONER

## 2023-01-20 PROCEDURE — 80050 GENERAL HEALTH PANEL: CPT | Performed by: NURSE PRACTITIONER

## 2023-01-20 PROCEDURE — 86803 HEPATITIS C AB TEST: CPT | Performed by: NURSE PRACTITIONER

## 2023-01-20 PROCEDURE — 83036 HEMOGLOBIN GLYCOSYLATED A1C: CPT | Performed by: INTERNAL MEDICINE

## 2023-01-20 PROCEDURE — 82306 VITAMIN D 25 HYDROXY: CPT | Performed by: NURSE PRACTITIONER

## 2023-01-20 PROCEDURE — 84403 ASSAY OF TOTAL TESTOSTERONE: CPT | Performed by: INTERNAL MEDICINE

## 2023-01-20 PROCEDURE — 36415 COLL VENOUS BLD VENIPUNCTURE: CPT | Performed by: NURSE PRACTITIONER

## 2023-01-20 PROCEDURE — 83525 ASSAY OF INSULIN: CPT | Performed by: INTERNAL MEDICINE

## 2023-01-20 PROCEDURE — 82670 ASSAY OF TOTAL ESTRADIOL: CPT | Performed by: INTERNAL MEDICINE

## 2023-01-20 PROCEDURE — 82728 ASSAY OF FERRITIN: CPT | Performed by: NURSE PRACTITIONER

## 2023-01-20 PROCEDURE — 84466 ASSAY OF TRANSFERRIN: CPT | Performed by: NURSE PRACTITIONER

## 2023-01-20 PROCEDURE — 82746 ASSAY OF FOLIC ACID SERUM: CPT | Performed by: NURSE PRACTITIONER

## 2023-01-21 LAB
25(OH)D3 SERPL-MCNC: 21.1 NG/ML (ref 30–100)
ALBUMIN UR-MCNC: <1.2 MG/DL
CREAT UR-MCNC: 69.2 MG/DL
ESTRADIOL SERPL HS-MCNC: 148 PG/ML
FERRITIN SERPL-MCNC: 71.9 NG/ML (ref 13–150)
FOLATE SERPL-MCNC: >20 NG/ML (ref 4.78–24.2)
HCV AB SER DONR QL: NORMAL
IRON 24H UR-MRATE: 96 MCG/DL (ref 37–145)
IRON SATN MFR SERPL: 21 % (ref 20–50)
MICROALBUMIN/CREAT UR: NORMAL MG/G{CREAT}
T4 FREE SERPL-MCNC: 1.09 NG/DL (ref 0.93–1.7)
TESTOST SERPL-MCNC: 28.4 NG/DL (ref 8.4–48.1)
TIBC SERPL-MCNC: 459 MCG/DL (ref 298–536)
TRANSFERRIN SERPL-MCNC: 308 MG/DL (ref 200–360)
TSH SERPL DL<=0.05 MIU/L-ACNC: 2.16 UIU/ML (ref 0.27–4.2)
VIT B12 BLD-MCNC: 402 PG/ML (ref 211–946)

## 2023-01-23 ENCOUNTER — SPECIALTY PHARMACY (OUTPATIENT)
Dept: ENDOCRINOLOGY | Facility: CLINIC | Age: 43
End: 2023-01-23
Payer: COMMERCIAL

## 2023-01-23 LAB — INSULIN SERPL-ACNC: 23.3 UIU/ML (ref 2.6–24.9)

## 2023-01-23 NOTE — PROGRESS NOTES
Specialty Pharmacy Refill Coordination Note     Maria E is a 42 y.o. female contacted today regarding refills of  ozmepic specialty medication(s).    Reviewed and verified with patient:       Specialty medication(s) and dose(s) confirmed: yes    Refill Questions    Flowsheet Row Most Recent Value   Changes to allergies? No   Changes to medications? No   New conditions since last clinic visit No   Unplanned office visit, urgent care, ED, or hospital admission in the last 4 weeks  No   How does patient/caregiver feel medication is working? Very good   Financial problems or insurance changes  No   Since the previous refill, were any specialty medication doses or scheduled injections missed or delayed?  No   Does this patient require a clinical escalation to a pharmacist? No                     Follow-up: 30 day(s)     Priscila Shah, Pharmacy Technician  Specialty Pharmacy Technician

## 2023-02-08 ENCOUNTER — TELEPHONE (OUTPATIENT)
Dept: FAMILY MEDICINE CLINIC | Facility: CLINIC | Age: 43
End: 2023-02-08
Payer: COMMERCIAL

## 2023-02-08 NOTE — TELEPHONE ENCOUNTER
Maria E Parra MA   2/8/2023  4:08 PM CST   Contacted the patient and informed her of the message. She stated understanding and thanked me.     Arielle Aguirre MA   2/6/2023 11:19 AM CST   Attempted, VM left to call our office.     Racheljose alberto Pierre, KIERRA   2/4/2023 10:51 AM CST   Pls inform her vitamin D has come up but remains insufficient.  I want her to start taking two of the 5000 unit tablets every day.  Lipid panel is normal.  B 12 level is normal.  Hepatitis C is negative.  Thyroid labs normal.

## 2023-02-21 ENCOUNTER — SPECIALTY PHARMACY (OUTPATIENT)
Dept: ENDOCRINOLOGY | Facility: CLINIC | Age: 43
End: 2023-02-21
Payer: COMMERCIAL

## 2023-02-21 DIAGNOSIS — F41.9 ANXIETY: ICD-10-CM

## 2023-02-21 RX ORDER — DESVENLAFAXINE SUCCINATE 50 MG/1
50 TABLET, EXTENDED RELEASE ORAL DAILY
Qty: 30 TABLET | Refills: 4 | Status: SHIPPED | OUTPATIENT
Start: 2023-02-21

## 2023-02-21 NOTE — PROGRESS NOTES
Specialty Pharmacy Refill Coordination Note     Maria E is a 42 y.o. female contacted today regarding refills of  ozempic specialty medication(s).    Reviewed and verified with patient:  Allergies       Specialty medication(s) and dose(s) confirmed: yes    Refill Questions    Flowsheet Row Most Recent Value   Changes to allergies? No   Changes to medications? No   New conditions since last clinic visit No   Unplanned office visit, urgent care, ED, or hospital admission in the last 4 weeks  No   How does patient/caregiver feel medication is working? Very good   Financial problems or insurance changes  No   Since the previous refill, were any specialty medication doses or scheduled injections missed or delayed?  No   Does this patient require a clinical escalation to a pharmacist? No          Delivery Questions    Flowsheet Row Most Recent Value   Delivery method  at Pharmacy   Delivery address correct? Yes   Number of medications in delivery 1   Medication being filled and delivered ozempic   Doses left of specialty medications 1   Is there any medication that is due not being filled? No   Supplies needed? No supplies needed   Cooler needed? No   Do any medications need mixed or dated? No   Copay form of payment Pay at pickup   Questions or concerns for the pharmacist? No   Are any medications first time fills? No        No issues to report. Pt will  Wednesday.       Follow-up: 1 month     Domenico Shelton  Specialty Pharmacy Technician

## 2023-03-17 ENCOUNTER — SPECIALTY PHARMACY (OUTPATIENT)
Dept: ENDOCRINOLOGY | Facility: CLINIC | Age: 43
End: 2023-03-17
Payer: COMMERCIAL

## 2023-03-17 NOTE — PROGRESS NOTES
Specialty Pharmacy Refill Coordination Note     Maria E is a 42 y.o. female contacted today regarding refills of  ozempic specialty medication(s).    Reviewed and verified with patient: Allergies    Specialty medication(s) and dose(s) confirmed: yes    Refill Questions    Flowsheet Row Most Recent Value   Changes to allergies? No   Changes to medications? Yes  [Started lisinopril]   New conditions since last clinic visit No   Unplanned office visit, urgent care, ED, or hospital admission in the last 4 weeks  No   How does patient/caregiver feel medication is working? Very good   Financial problems or insurance changes  No   Since the previous refill, were any specialty medication doses or scheduled injections missed or delayed?  No   Does this patient require a clinical escalation to a pharmacist? No          Delivery Questions    Flowsheet Row Most Recent Value   Delivery method  at Pharmacy   Number of medications in delivery 1   Medication being filled and delivered ozempic   Doses left of specialty medications 1   Is there any medication that is due not being filled? No   Supplies needed? No supplies needed   Cooler needed? No   Questions or concerns for the pharmacist? No   Are any medications first time fills? No        Patient is doing well and will  RX next week.  She did start lisinopril since her previous appt.          Follow-up: 1 month.  Sees the doctor next month.      Domenico Shelton  Specialty Pharmacy Technician

## 2023-03-29 ENCOUNTER — SPECIALTY PHARMACY (OUTPATIENT)
Dept: ENDOCRINOLOGY | Facility: CLINIC | Age: 43
End: 2023-03-29
Payer: COMMERCIAL

## 2023-04-12 ENCOUNTER — OFFICE VISIT (OUTPATIENT)
Dept: ENDOCRINOLOGY | Facility: CLINIC | Age: 43
End: 2023-04-12
Payer: COMMERCIAL

## 2023-04-12 VITALS
SYSTOLIC BLOOD PRESSURE: 110 MMHG | WEIGHT: 249 LBS | DIASTOLIC BLOOD PRESSURE: 70 MMHG | OXYGEN SATURATION: 98 % | HEIGHT: 67 IN | BODY MASS INDEX: 39.08 KG/M2 | HEART RATE: 78 BPM

## 2023-04-12 DIAGNOSIS — E04.9 GOITER: ICD-10-CM

## 2023-04-12 DIAGNOSIS — E55.9 VITAMIN D DEFICIENCY: ICD-10-CM

## 2023-04-12 DIAGNOSIS — E28.2 PCOS (POLYCYSTIC OVARIAN SYNDROME): Primary | ICD-10-CM

## 2023-04-12 DIAGNOSIS — E16.1 HYPERINSULINEMIA: ICD-10-CM

## 2023-04-12 PROCEDURE — 99214 OFFICE O/P EST MOD 30 MIN: CPT | Performed by: INTERNAL MEDICINE

## 2023-04-12 RX ORDER — SEMAGLUTIDE 2.68 MG/ML
2 INJECTION, SOLUTION SUBCUTANEOUS WEEKLY
Qty: 9 ML | Refills: 3 | Status: SHIPPED | OUTPATIENT
Start: 2023-04-12

## 2023-04-12 NOTE — PROGRESS NOTES
"Chief Complaint  Polycystic Ovary Syndrome    Subjective          Maria E Red presents to [facility] for   History of Present Illness    Very pleasant 41-year-old female who I met with for the first time in 2021 for polycystic ovarian syndrome and goiter.    Diagnosis of polycystic ovarian syndrome is well-established by imaging documenting typical appearance of ovarian cysts, hirsutism affecting the chin and upper lip area evidence on physical exam and testosterone level in the upper limit of normal at 40s, and oligomenorrhea.  We also ruled out Cushing's by dexamethasone suppression test and congenital adrenal hyperplasia    In 2021 we initiated Ozempic and metformin and since then she has had regular menstrual cycles and reduction in hirsutism.  She has also lost weight  She is not on OCP due to side effects and was using intermittent progesterone but now not needed    Pertaining goiter, ultrasound was done in 2021 and the right lobe measures 5.3 cm while the left lobe measures 4.7.  There are no nodules, thyroid architecture is homogeneous, TPO antibodies are negative, TSH is normal    Objective   Vital Signs:   /70   Pulse 78   Ht 170.2 cm (67\")   Wt 113 kg (249 lb)   SpO2 98%   BMI 39.00 kg/m²     Physical Exam   Patient is alert oriented and very pleasant    Normocephalic    Neck no palpable goiter, no thyroid nodules    There is appreciable recently shaved terminal hair in the chin area    Cardiovascular normal rhythm and rate    Normal respiratory effort normal breath sounds    Abdomen with thin striae    Skin, hirsutism appreciated in chin and upper lip recently shaven    No cushingoid features    Result Review :   The following data was reviewed by: Clinton Montes De Oca MD on 02/02/2021:  Common labs        4/21/2022    09:50 1/20/2023    10:41 1/20/2023    11:03   Common Labs   Glucose 89   84      BUN 11   11      Creatinine 0.77  "  0.56      Sodium 139   134      Potassium 3.8   3.9      Chloride 101   102      Calcium 8.4   8.8      Albumin 4.20   4.2      Total Bilirubin 0.6   1.2      Alkaline Phosphatase 77   67      AST (SGOT) 18   30      ALT (SGPT) 25   35      WBC 7.68   9.32      Hemoglobin 14.1   13.3      Hematocrit 41.2   38.4      Platelets 292   224      Total Cholesterol  147      Triglycerides  91      HDL Cholesterol  43      LDL Cholesterol   87      Hemoglobin A1C 5.60    5.20     Microalbumin, Urine   <1.2           Thyroid Workup    Lab Results   Component Value Date    TSH 2.160 01/20/2023    TSH 2.320 04/21/2022       Lab Results   Component Value Date    FREET4 1.09 01/20/2023    FREET4 1.25 11/02/2020       No results found for: T3FREE    TPO antibodies    Lab Results   Component Value Date    THYROIDAB <9 02/02/2021       TSI antibodies    No results found for: THYRSTIMIMMU    --------------------------    Lab Results   Component Value Date    DRJHWALK57 402 01/20/2023       ---------------------------    Lab Results   Component Value Date    PJTM64KZ 21.1 (L) 01/20/2023    TKVE94PK 18.6 (L) 04/21/2022                            Assessment and Plan    Problem List Items Addressed This Visit        Other    Vitamin D deficiency    Relevant Orders    CBC Auto Differential    Comprehensive Metabolic Panel    Hemoglobin A1c    Lipid Panel    Microalbumin / Creatinine Urine Ratio - Urine, Clean Catch    TSH    Vitamin B12    Goiter    Relevant Orders    CBC Auto Differential    Comprehensive Metabolic Panel    Hemoglobin A1c    Lipid Panel    Microalbumin / Creatinine Urine Ratio - Urine, Clean Catch    TSH    Vitamin B12   Other Visit Diagnoses     PCOS (polycystic ovarian syndrome)    -  Primary    Relevant Orders    CBC Auto Differential    Comprehensive Metabolic Panel    Hemoglobin A1c    Lipid Panel    Microalbumin / Creatinine Urine Ratio - Urine, Clean Catch    TSH    Vitamin B12    Hyperinsulinemia         Relevant Orders    CBC Auto Differential    Comprehensive Metabolic Panel    Hemoglobin A1c    Lipid Panel    Microalbumin / Creatinine Urine Ratio - Urine, Clean Catch    TSH    Vitamin B12      Perceived goiter    Mainly reassurance, slight deviation from upper limit of normal of right lobe but otherwise thyroid ultrasound completely normal and normal TSH.    TPO ab negative     Reassurance    =======    PCOS    Hyperinsulinemia - Type 2 Diabetes    Metformin  2 g a day - stop   Ozempic 2 mg weekly     She has side effects to oral contraceptives so we can use intermittent progesterone. I would be hesitant to use Aldactone in the absence of a contraceptive method    Now having regular menstrual cycles, cautioned that fertility has increased    =======      Obesity    Responding well to Ozempic    =======    Obstructive sleep apnea suspicion    Referred to sleep medicine  Ruled out sleep apnea    ==========    Vitamin D deficiency was on 5th , now 10 th     =  Follow Up   No follow-ups on file.  Patient was given instructions and counseling regarding her condition or for health maintenance advice. Please see specific information pulled into the AVS if appropriate.     New Medications Ordered This Visit   Medications   • Semaglutide, 2 MG/DOSE, (Ozempic, 2 MG/DOSE,) 8 MG/3ML solution pen-injector     Sig: Inject 2 mg under the skin into the appropriate area as directed 1 (One) Time Per Week.     Dispense:  9 mL     Refill:  3       Orders Placed This Encounter   Procedures   • CBC Auto Differential   • Comprehensive Metabolic Panel   • Hemoglobin A1c   • Lipid Panel   • Microalbumin / Creatinine Urine Ratio - Urine, Clean Catch   • TSH   • Vitamin B12             This document has been electronically signed by Clinton Montes De Oca MD on April 12, 2023 10:42 CDT

## 2023-05-04 ENCOUNTER — SPECIALTY PHARMACY (OUTPATIENT)
Dept: ENDOCRINOLOGY | Facility: CLINIC | Age: 43
End: 2023-05-04
Payer: COMMERCIAL

## 2023-05-04 NOTE — PROGRESS NOTES
Specialty Pharmacy Refill Coordination Note     Maria E is a 42 y.o. female contacted today regarding refills of  ozempic  specialty medication(s).    Reviewed and verified with patient:       Specialty medication(s) and dose(s) confirmed: yes    Refill Questions    Flowsheet Row Most Recent Value   Changes to allergies? No   Changes to medications? No   Financial problems or insurance changes  No   Since the previous refill, were any specialty medication doses or scheduled injections missed or delayed?  No   Does this patient require a clinical escalation to a pharmacist? No          Delivery Questions    Flowsheet Row Most Recent Value   Delivery method  at Pharmacy          Also filling pristiq for pharmacy         Follow-up: 90 day(s)     Priscila Shah, Pharmacy Technician  Specialty Pharmacy Technician

## 2023-05-09 ENCOUNTER — OFFICE VISIT (OUTPATIENT)
Dept: FAMILY MEDICINE CLINIC | Facility: CLINIC | Age: 43
End: 2023-05-09
Payer: COMMERCIAL

## 2023-05-09 VITALS
DIASTOLIC BLOOD PRESSURE: 90 MMHG | BODY MASS INDEX: 39.62 KG/M2 | OXYGEN SATURATION: 97 % | HEART RATE: 78 BPM | TEMPERATURE: 96.7 F | HEIGHT: 67 IN | SYSTOLIC BLOOD PRESSURE: 130 MMHG | WEIGHT: 252.4 LBS

## 2023-05-09 DIAGNOSIS — F41.1 GENERALIZED ANXIETY DISORDER: Primary | ICD-10-CM

## 2023-05-09 DIAGNOSIS — F33.9 EPISODE OF RECURRENT MAJOR DEPRESSIVE DISORDER, UNSPECIFIED DEPRESSION EPISODE SEVERITY: ICD-10-CM

## 2023-05-09 DIAGNOSIS — R46.0 POOR PERSONAL HYGIENE: ICD-10-CM

## 2023-05-09 DIAGNOSIS — R45.86 MOOD SWINGS: ICD-10-CM

## 2023-05-09 PROCEDURE — 99214 OFFICE O/P EST MOD 30 MIN: CPT | Performed by: NURSE PRACTITIONER

## 2023-05-09 RX ORDER — LISINOPRIL 20 MG/1
20 TABLET ORAL DAILY
Qty: 90 TABLET | Refills: 1 | Status: SHIPPED | OUTPATIENT
Start: 2023-05-09

## 2023-05-09 NOTE — PROGRESS NOTES
"Subjective   Maria E Red is a 42 y.o. female who presents to the office for anxiety and depression follow-up.  Had GeneSight performed which found that Pristiq and Wellbutrin would be effective for her.  She has tried the Wellbutrin which made her anxiety worse, she took Celexa years ago which \"made her a crazy person.\"  Has been on Pristiq at 50 mg ER and she said that she could tell an improvement in her symptoms when she first started taking it over a year ago but then it waned.  She did see a male counselor at [facility] but she felt like they sat around and talked about nothing than really getting down to her issues.  Tells me that she has a good support system of family and friends.  Has not had Lisinopril in two days, will need a refill.  Has brought in a list of symptoms that she is having including:  Mood swings, overwhelmed, doing everything or nothing, sleeping too much or not at all, easily frustrated/temper, loss of words/names, increased procrastination, brain fog, muscle aches, headaches, increased tired/exhausted, occasional panic/dread, low self-esteem, self-doubt, forgetting to eat or eating everything, increased lip chewing/insides of her cheeks, increased biting her tongue in her sleep, occasional lack of hygiene and being angry.  History of Present Illness     The following portions of the patient's history were reviewed and updated as appropriate: allergies, current medications, past family history, past medical history, past social history, past surgical history and problem list.    Review of Systems   Constitutional: Negative for chills, fatigue and fever.   HENT: Negative for congestion, sneezing, sore throat and trouble swallowing.    Eyes: Negative for visual disturbance.   Respiratory: Negative for cough, chest tightness, shortness of breath and wheezing.    Cardiovascular: Negative for chest pain, palpitations and leg swelling.   Gastrointestinal: Negative for abdominal pain, " constipation, diarrhea, nausea and vomiting.   Genitourinary: Negative for dysuria, frequency and urgency.   Musculoskeletal: Negative for neck pain.   Skin: Negative for rash.   Neurological: Negative for dizziness, weakness and headaches.   Psychiatric/Behavioral: Positive for agitation, behavioral problems, decreased concentration and dysphoric mood. The patient is nervous/anxious.         In the past two weeks the pt has not felt down, depressed, hopeless or lost interest in doing things   All other systems reviewed and are negative.      Objective   Physical Exam  Vitals and nursing note reviewed.   Constitutional:       General: She is not in acute distress.     Appearance: She is well-developed. She is not ill-appearing.   HENT:      Head: Normocephalic and atraumatic.      Right Ear: External ear normal.      Left Ear: External ear normal.      Nose: Nose normal.   Eyes:      General: No scleral icterus.        Right eye: No discharge.         Left eye: No discharge.      Conjunctiva/sclera: Conjunctivae normal.      Pupils: Pupils are equal, round, and reactive to light.   Neck:      Thyroid: No thyromegaly.   Cardiovascular:      Rate and Rhythm: Normal rate and regular rhythm.      Heart sounds: Normal heart sounds. No murmur heard.    No friction rub. No gallop.   Pulmonary:      Effort: Pulmonary effort is normal. No respiratory distress.      Breath sounds: Normal breath sounds. No wheezing or rales.   Abdominal:      General: Bowel sounds are normal. There is no distension.      Palpations: Abdomen is soft.      Tenderness: There is no abdominal tenderness. There is no guarding or rebound.   Musculoskeletal:         General: Normal range of motion.      Cervical back: Normal range of motion and neck supple.   Lymphadenopathy:      Cervical: No cervical adenopathy.   Skin:     General: Skin is warm and dry.      Capillary Refill: Capillary refill takes less than 2 seconds.      Findings: No rash.    Neurological:      General: No focal deficit present.      Mental Status: She is alert and oriented to person, place, and time.      GCS: GCS eye subscore is 4. GCS verbal subscore is 5. GCS motor subscore is 6.      Cranial Nerves: Cranial nerves 2-12 are intact. No cranial nerve deficit.      Sensory: Sensation is intact.      Motor: Motor function is intact.      Coordination: Coordination is intact.      Gait: Gait is intact.   Psychiatric:         Attention and Perception: Attention and perception normal.         Mood and Affect: Mood and affect normal.         Speech: Speech normal.         Behavior: Behavior normal. Behavior is cooperative.         Thought Content: Thought content normal. Thought content does not include homicidal or suicidal ideation. Thought content does not include homicidal or suicidal plan.         Cognition and Memory: Cognition and memory normal.         Judgment: Judgment normal.      Comments: Dressed appropriately for weather and situation, makes eye contact and engages in conversation         Assessment & Plan   Diagnoses and all orders for this visit:    1. Generalized anxiety disorder (Primary)  -     Ambulatory Referral to Psychiatry    2. Episode of recurrent major depressive disorder, unspecified depression episode severity  -     Ambulatory Referral to Psychiatry    3. Mood swings  -     Ambulatory Referral to Psychiatry    4. Poor personal hygiene  -     Ambulatory Referral to Psychiatry    Other orders  -     lisinopril (PRINIVIL,ZESTRIL) 20 MG tablet; Take 1 tablet by mouth Daily.  Dispense: 90 tablet; Refill: 1    Feel it would be best if she saw psychiatry for medication management.  Has had GeneSight therapy performed.             This document has been electronically signed by KIERRA Oneal on May 9, 2023 10:40 CDT

## 2023-05-22 PROBLEM — E28.2 PCOS (POLYCYSTIC OVARIAN SYNDROME): Status: ACTIVE | Noted: 2023-05-22

## (undated) DEVICE — GLV SURG SENSICARE GREEN W/ALOE PF LF 7 STRL

## (undated) DEVICE — PK D AND C 60

## (undated) DEVICE — STERILE POLYISOPRENE POWDER-FREE SURGICAL GLOVES WITH EMOLLIENT COATING: Brand: PROTEXIS

## (undated) DEVICE — NDL SPINAL QUINCKE 22G 3IN BLK

## (undated) DEVICE — 3M™ STERI-STRIP™ REINFORCED ADHESIVE SKIN CLOSURES, R1547, 1/2 IN X 4 IN (12 MM X 100 MM), 6 STRIPS/ENVELOPE: Brand: 3M™ STERI-STRIP™

## (undated) DEVICE — PENCL E/S HNDSWCH PUSHBTN HOLSTR 10FT

## (undated) DEVICE — CONN TBG Y 5 IN 1 LF STRL

## (undated) DEVICE — GOWN,AURORA,NOREINF,RAGLAN,XL,STERILE: Brand: MEDLINE

## (undated) DEVICE — UNDRPD BREATH 23X36 BG/10

## (undated) DEVICE — CATHETER,URETHRAL,REDRUBBER,STRL,18FR: Brand: MEDLINE

## (undated) DEVICE — SYR LL TP 10ML STRL

## (undated) DEVICE — SPNG GZ WOVN 4X4IN 12PLY 10/BX STRL

## (undated) DEVICE — ELECTRD BLD STD SS 3/32X6.5IN

## (undated) DEVICE — SUT GUT CHRM 2/0 CT1 36IN 923H

## (undated) DEVICE — GLV SURG SENSICARE GREEN W/ALOE PF LF 6.5 STRL

## (undated) DEVICE — TP SXN YANKR BLB TIP W/TBG 10F LF STRL

## (undated) DEVICE — CONTAINER,SPECIMEN,OR STERILE,4OZ: Brand: MEDLINE

## (undated) DEVICE — ELECTRODE LOOP 1.5 X 7CM

## (undated) DEVICE — GLV SURG TRIUMPH LT PF LTX 6.5 STRL

## (undated) DEVICE — SHEET,DRAPE,53X77,STERILE: Brand: MEDLINE

## (undated) DEVICE — PAD GRND REM POLYHESIVE A/ DISP

## (undated) DEVICE — TUBING, SUCTION, 3/16" X 6', STRAIGHT: Brand: MEDLINE

## (undated) DEVICE — DRP SURG U/BUTT W/PCH BACK STRL

## (undated) DEVICE — ELECTRD BALL 5MM